# Patient Record
Sex: FEMALE | Race: BLACK OR AFRICAN AMERICAN | Employment: OTHER | URBAN - METROPOLITAN AREA
[De-identification: names, ages, dates, MRNs, and addresses within clinical notes are randomized per-mention and may not be internally consistent; named-entity substitution may affect disease eponyms.]

---

## 2017-07-13 ENCOUNTER — OFFICE VISIT (OUTPATIENT)
Dept: FAMILY MEDICINE CLINIC | Age: 32
End: 2017-07-13

## 2017-07-13 ENCOUNTER — HOSPITAL ENCOUNTER (OUTPATIENT)
Dept: LAB | Age: 32
Discharge: HOME | End: 2017-07-13
Payer: COMMERCIAL

## 2017-07-13 VITALS
SYSTOLIC BLOOD PRESSURE: 118 MMHG | DIASTOLIC BLOOD PRESSURE: 80 MMHG | HEART RATE: 97 BPM | WEIGHT: 263 LBS | HEIGHT: 65 IN | BODY MASS INDEX: 43.82 KG/M2 | TEMPERATURE: 98 F | RESPIRATION RATE: 16 BRPM | OXYGEN SATURATION: 99 %

## 2017-07-13 DIAGNOSIS — R63.5 ABNORMAL WEIGHT GAIN: ICD-10-CM

## 2017-07-13 DIAGNOSIS — R63.5 ABNORMAL WEIGHT GAIN: Primary | ICD-10-CM

## 2017-07-13 PROCEDURE — 84443 ASSAY THYROID STIM HORMONE: CPT | Performed by: FAMILY MEDICINE

## 2017-07-13 PROCEDURE — 36415 COLL VENOUS BLD VENIPUNCTURE: CPT | Performed by: FAMILY MEDICINE

## 2017-07-13 PROCEDURE — 80048 BASIC METABOLIC PNL TOTAL CA: CPT | Performed by: FAMILY MEDICINE

## 2017-07-13 RX ORDER — PHENTERMINE HYDROCHLORIDE 37.5 MG/1
37.5 TABLET ORAL
Qty: 30 TAB | Refills: 2 | Status: SHIPPED | OUTPATIENT
Start: 2017-07-13 | End: 2018-07-16 | Stop reason: SDUPTHER

## 2017-07-13 NOTE — PROGRESS NOTES
HISTORY OF PRESENT ILLNESS  Dara Holder is a 32 y.o. female. HPI  Patient is here today for evaluation and treatment of: Weight Gain / Fatigue    Weight Gain/fatigue:  States that she feels hungry always. Has noted feeling tired throughout the day. May sleep about 6- 6. 5 hours a night; She has been diagnosed with sleep apnea; she used to use CPAP; Thinks this helped when she used it. Has gained about 20 pounds in last 3 months. Has had some stressors. It is unclear if there are thyroid problems in the family. She does not exercise;  Tries to eat a healthy diet. This comes and goes. She does have a gym membership. Has never been on weight loss meds before. PMH,  Meds, Allergies, Family History, Social history reviewed      Current Outpatient Prescriptions:     ibuprofen (MOTRIN) 800 mg tablet, Take 1 Tab by mouth three (3) times daily as needed for Pain., Disp: 40 Tab, Rfl: 0    ethinyl estradiol-etonogestrel (NUVARING) 0.12-0.015 mg/24 hr vaginal ring, Insert intravaginally x 3 weeks, remove for 1 week, and insert a new nuvaring as directed  Indications: PREGNANCY CONTRACEPTION, Disp: 3 Device, Rfl: 12    HYDROcodone-acetaminophen (NORCO) 5-325 mg per tablet, 1-2 tablets every 4-6 hours as needed for pain, Disp: 20 Tab, Rfl: 0    Review of Systems   Constitutional: Positive for malaise/fatigue. Cardiovascular: Negative for chest pain and palpitations. Physical Exam   Constitutional: She appears well-developed and well-nourished. No distress. Cardiovascular: Normal rate and regular rhythm. Exam reveals no gallop and no friction rub. No murmur heard. Pulmonary/Chest: Breath sounds normal. No respiratory distress. She has no wheezes. She has no rales. Musculoskeletal: She exhibits no edema. Nursing note and vitals reviewed. ASSESSMENT and PLAN    ICD-10-CM ICD-9-CM    1.  Abnormal weight gain B69.3 192.0 METABOLIC PANEL, BASIC      TSH 3RD GENERATION       As above, new treatment plan as listed below  Orders Placed This Encounter    METABOLIC PANEL, BASIC    TSH 3RD GENERATION    phentermine (ADIPEX-P) 37.5 mg tablet     Diet and exercise encouraged  Phentermine as ordered; proper use reviewed. Follow-up Disposition:  Return in about 6 weeks (around 8/24/2017) for weight. An After Visit Summary was printed and given to the patient. This has been fully explained to the patient, who indicates understanding.

## 2017-07-13 NOTE — MR AVS SNAPSHOT
Visit Information Date & Time Provider Department Dept. Phone Encounter #  
 7/13/2017  3:40 PM Ney Taylor, [address] [id number] Follow-up Instructions Return in about 6 weeks (around 8/24/2017) for weight. Upcoming Health Maintenance Date Due DTaP/Tdap/Td series (1 - Tdap) 9/17/2006 INFLUENZA AGE 9 TO ADULT 8/1/2017 PAP AKA CERVICAL CYTOLOGY 9/22/2019 Allergies as of 7/13/2017  Review Complete On: 7/13/2017 By: Mariola Berman LPN Severity Noted Reaction Type Reactions Zithromax [Azithromycin] High 05/04/2010   Side Effect Rash Percocet [Oxycodone-acetaminophen]  10/04/2016    Nausea Only Current Immunizations  Never Reviewed No immunizations on file. Not reviewed this visit You Were Diagnosed With   
  
 Codes Comments Abnormal weight gain    -  Primary ICD-10-CM: R63.5 ICD-9-CM: 783.1 Vitals BP Pulse Temp Resp Height(growth percentile) Weight(growth percentile) 118/80 (BP 1 Location: Left arm, BP Patient Position: Sitting) 97 98 °F (36.7 °C) (Oral) 16 5' 5\" (1.651 m) 263 lb (119.3 kg) LMP SpO2 BMI OB Status Smoking Status 07/03/2017 99% 43.77 kg/m2 Having regular periods Former Smoker BMI and BSA Data Body Mass Index Body Surface Area 43.77 kg/m 2 2.34 m 2 Preferred Pharmacy Pharmacy Name Phone [address], [address] [place] [address] [facility] [phone] Your Updated Medication List  
  
   
This list is accurate as of: 7/13/17  4:36 PM.  Always use your most recent med list.  
  
  
  
  
 ethinyl estradiol-etonogestrel 0.12-0.015 mg/24 hr vaginal ring Commonly known as:  Duke Brown Insert intravaginally x 3 weeks, remove for 1 week, and insert a new nuvaring as directed  Indications: PREGNANCY CONTRACEPTION  
  
 ibuprofen 800 mg tablet Commonly known as:  MOTRIN  
 Take 1 Tab by mouth three (3) times daily as needed for Pain.  
  
 phentermine 37.5 mg tablet Commonly known as:  ADIPEX-P Take 1 Tab by mouth every morning. Max Daily Amount: 37.5 mg.  
  
  
  
  
Prescriptions Printed Refills  
 phentermine (ADIPEX-P) 37.5 mg tablet 2 Sig: Take 1 Tab by mouth every morning. Max Daily Amount: 37.5 mg.  
 Class: Print Route: Oral  
  
Follow-up Instructions Return in about 6 weeks (around 8/24/2017) for weight. Patient Instructions Abnormal Weight Gain: Care Instructions Your Care Instructions There are two types of weight gainnormal and abnormal. Normal weight gain is usually caused by eating too much or exercising too little. It can also happen as you get older. But abnormal weight gain has other causes. It can be caused by a problem with your thyroid gland, called hypothyroidism. Or it can be caused by a problem with your adrenal glands, called Cushing's syndrome. Or your body could be holding too much fluid because of kidney, liver, or heart problems. In some cases, a medicine you take can cause you to gain weight. You can work with your doctor to find out the cause of your weight gain. You will probably need tests to do this. Follow-up care is a key part of your treatment and safety. Be sure to make and go to all appointments, and call your doctor if you are having problems. It's also a good idea to know your test results and keep a list of the medicines you take. How can you care for yourself at home? · Weigh yourself at the same time every day. It's best to do it first thing in the morning after you empty your bladder. Be sure to always wear the same amount of clothing. · Write down any changes in your weight and the possible causes. Discuss these with your doctor. · Your doctor may want you to change your diet and exercise habits. A good way to lose weight is to reduce calories and increase exercise. · Walking is an easy way to get exercise. Try to walk a little longer every day. You also may want to swim, bike, or do other activities. · Ask your doctor if you should see a dietitian. This is a person who can help you plan meals that work best for your lifestyle. · If your doctor prescribed medicines, take them exactly as prescribed. Call your doctor if you think you are having a problem with your medicine. You will get more details on the specific medicines your doctor prescribes. When should you call for help? Watch closely for changes in your health, and be sure to contact your doctor if: 
· You do not get better as expected. · You continue to gain weight. Where can you learn more? Go to http://marilynn-gabriela.info/. Enter A175 in the search box to learn more about \"Abnormal Weight Gain: Care Instructions. \" Current as of: October 13, 2016 Content Version: 11.3 © 1975-9338 [employer]. Care instructions adapted under license by [employer] (which disclaims liability or warranty for this information). If you have questions about a medical condition or this instruction, always ask your healthcare professional. [address] any warranty or liability for your use of this information. Please provide this summary of care documentation to your next provider. Your primary care clinician is listed as Marley Partida. If you have any questions after today's visit, please call [phone].

## 2017-07-13 NOTE — PROGRESS NOTES
1. Have you been to the ER, urgent care clinic since your last visit? Hospitalized since your last visit? No    2. Have you seen or consulted any other health care providers outside of the [address] [place] [address] since your last visit? Include any pap smears or colon screening.  [facility]

## 2017-07-13 NOTE — PATIENT INSTRUCTIONS
Abnormal Weight Gain: Care Instructions  Your Care Instructions  There are two types of weight gainnormal and abnormal. Normal weight gain is usually caused by eating too much or exercising too little. It can also happen as you get older. But abnormal weight gain has other causes. It can be caused by a problem with your thyroid gland, called hypothyroidism. Or it can be caused by a problem with your adrenal glands, called Cushing's syndrome. Or your body could be holding too much fluid because of kidney, liver, or heart problems. In some cases, a medicine you take can cause you to gain weight. You can work with your doctor to find out the cause of your weight gain. You will probably need tests to do this. Follow-up care is a key part of your treatment and safety. Be sure to make and go to all appointments, and call your doctor if you are having problems. It's also a good idea to know your test results and keep a list of the medicines you take. How can you care for yourself at home? · Weigh yourself at the same time every day. It's best to do it first thing in the morning after you empty your bladder. Be sure to always wear the same amount of clothing. · Write down any changes in your weight and the possible causes. Discuss these with your doctor. · Your doctor may want you to change your diet and exercise habits. A good way to lose weight is to reduce calories and increase exercise. · Walking is an easy way to get exercise. Try to walk a little longer every day. You also may want to swim, bike, or do other activities. · Ask your doctor if you should see a dietitian. This is a person who can help you plan meals that work best for your lifestyle. · If your doctor prescribed medicines, take them exactly as prescribed. Call your doctor if you think you are having a problem with your medicine. You will get more details on the specific medicines your doctor prescribes.   When should you call for help?  Watch closely for changes in your health, and be sure to contact your doctor if:  · You do not get better as expected. · You continue to gain weight. Where can you learn more? Go to http://marilynn-gabriela.info/. Enter A175 in the search box to learn more about \"Abnormal Weight Gain: Care Instructions. \"  Current as of: October 13, 2016  Content Version: 11.3  © 6730-3603 [employer]. Care instructions adapted under license by [employer] (which disclaims liability or warranty for this information). If you have questions about a medical condition or this instruction, always ask your healthcare professional. [address] any warranty or liability for your use of this information.

## 2017-07-14 LAB
ANION GAP BLD CALC-SCNC: 8 MMOL/L (ref 3–18)
BUN SERPL-MCNC: 15 MG/DL (ref 7–18)
BUN/CREAT SERPL: 18 (ref 12–20)
CALCIUM SERPL-MCNC: 9 MG/DL (ref 8.5–10.1)
CHLORIDE SERPL-SCNC: 106 MMOL/L (ref 100–108)
CO2 SERPL-SCNC: 25 MMOL/L (ref 21–32)
CREAT SERPL-MCNC: 0.82 MG/DL (ref 0.6–1.3)
GLUCOSE SERPL-MCNC: 86 MG/DL (ref 74–99)
POTASSIUM SERPL-SCNC: 4.2 MMOL/L (ref 3.5–5.5)
SODIUM SERPL-SCNC: 139 MMOL/L (ref 136–145)
TSH SERPL DL<=0.05 MIU/L-ACNC: 0.86 UIU/ML (ref 0.36–3.74)

## 2017-08-24 ENCOUNTER — OFFICE VISIT (OUTPATIENT)
Dept: FAMILY MEDICINE CLINIC | Age: 32
End: 2017-08-24

## 2017-08-24 VITALS
SYSTOLIC BLOOD PRESSURE: 110 MMHG | WEIGHT: 248 LBS | OXYGEN SATURATION: 98 % | BODY MASS INDEX: 41.32 KG/M2 | HEART RATE: 85 BPM | TEMPERATURE: 99.1 F | RESPIRATION RATE: 16 BRPM | DIASTOLIC BLOOD PRESSURE: 82 MMHG | HEIGHT: 65 IN

## 2017-08-24 DIAGNOSIS — R63.5 ABNORMAL WEIGHT GAIN: Primary | ICD-10-CM

## 2017-08-24 NOTE — PROGRESS NOTES
1. Have you been to the ER, urgent care clinic since your last visit? Hospitalized since your last visit? No    2. Have you seen or consulted any other health care providers outside of the [address] [place] [address] since your last visit? Include any pap smears or colon screening.  No

## 2017-08-24 NOTE — PROGRESS NOTES
HISTORY OF PRESENT ILLNESS  Martita Pepe is a 32 y.o. female. HPI  Patient is here today for follow up on: Weight Gain    Weight Gain: she is tolerating adipex well; she has lost weight; She has not started exercising yet. She has felt less hungry with adipex and is eating less. Wt Readings from Last 3 Encounters:   08/24/17 248 lb (112.5 kg)   07/13/17 263 lb (119.3 kg)   10/19/16 246 lb (111.6 kg)     BP Readings from Last 3 Encounters:   08/24/17 110/82   07/13/17 118/80   10/19/16 128/82           Current Outpatient Prescriptions:     phentermine (ADIPEX-P) 37.5 mg tablet, Take 1 Tab by mouth every morning. Max Daily Amount: 37.5 mg., Disp: 30 Tab, Rfl: 2    ibuprofen (MOTRIN) 800 mg tablet, Take 1 Tab by mouth three (3) times daily as needed for Pain., Disp: 40 Tab, Rfl: 0    ethinyl estradiol-etonogestrel (NUVARING) 0.12-0.015 mg/24 hr vaginal ring, Insert intravaginally x 3 weeks, remove for 1 week, and insert a new nuvaring as directed  Indications: PREGNANCY CONTRACEPTION, Disp: 3 Device, Rfl: 12    Review of Systems   Constitutional: Negative for chills and fever. Cardiovascular: Negative for chest pain and palpitations. Physical Exam   Constitutional: She appears well-developed and well-nourished. No distress. Cardiovascular: Normal rate and regular rhythm. Exam reveals no gallop and no friction rub. No murmur heard. Pulmonary/Chest: No respiratory distress. She has no wheezes. Nursing note and vitals reviewed. Visit Vitals    /82 (BP 1 Location: Left arm, BP Patient Position: Sitting)    Pulse 85    Temp 99.1 °F (37.3 °C) (Oral)    Resp 16    Ht 5' 5\" (1.651 m)    Wt 248 lb (112.5 kg)    LMP 08/01/2017    SpO2 98%    BMI 41.27 kg/m2         ASSESSMENT and PLAN    ICD-10-CM ICD-9-CM    1. Abnormal weight gain R63.5 783.1        Stable; pt has lost weight with adipex; Encouraged exercise to help promote further weight loss;   Follow-up Disposition:  Return in about 8 weeks (around 10/19/2017) for abnormal weight. An After Visit Summary was printed and given to the patient. This has been fully explained to the patient, who indicates understanding.

## 2017-08-24 NOTE — PATIENT INSTRUCTIONS
Abnormal Weight Gain: Care Instructions  Your Care Instructions  There are two types of weight gain--normal and abnormal. Normal weight gain is usually caused by eating too much or exercising too little. It can also happen as you get older. But abnormal weight gain has other causes. It can be caused by a problem with your thyroid gland, called hypothyroidism. Or it can be caused by a problem with your adrenal glands, called Cushing's syndrome. Or your body could be holding too much fluid because of kidney, liver, or heart problems. In some cases, a medicine you take can cause you to gain weight. You can work with your doctor to find out the cause of your weight gain. You will probably need tests to do this. Follow-up care is a key part of your treatment and safety. Be sure to make and go to all appointments, and call your doctor if you are having problems. It's also a good idea to know your test results and keep a list of the medicines you take. How can you care for yourself at home? · Weigh yourself at the same time every day. It's best to do it first thing in the morning after you empty your bladder. Be sure to always wear the same amount of clothing. · Write down any changes in your weight and the possible causes. Discuss these with your doctor. · Your doctor may want you to change your diet and exercise habits. A good way to lose weight is to reduce calories and increase exercise. · Walking is an easy way to get exercise. Try to walk a little longer every day. You also may want to swim, bike, or do other activities. · Ask your doctor if you should see a dietitian. This is a person who can help you plan meals that work best for your lifestyle. · If your doctor prescribed medicines, take them exactly as prescribed. Call your doctor if you think you are having a problem with your medicine. You will get more details on the specific medicines your doctor prescribes.   When should you call for help?  Watch closely for changes in your health, and be sure to contact your doctor if:  · You do not get better as expected. · You continue to gain weight. Where can you learn more? Go to http://marilynn-gabriela.info/. Enter A175 in the search box to learn more about \"Abnormal Weight Gain: Care Instructions. \"  Current as of: October 13, 2016  Content Version: 11.3  © 3543-1504 [employer]. Care instructions adapted under license by "[employer]" (which disclaims liability or warranty for this information). If you have questions about a medical condition or this instruction, always ask your healthcare professional. [address] any warranty or liability for your use of this information.

## 2017-08-24 NOTE — MR AVS SNAPSHOT
Visit Information Date & Time Provider Department Dept. Phone Encounter #  
 8/24/2017  3:40 PM Pietro Carlin [id number] Follow-up Instructions Return in about 8 weeks (around 10/19/2017) for abnormal weight. Upcoming Health Maintenance Date Due DTaP/Tdap/Td series (1 - Tdap) 9/17/2006 INFLUENZA AGE 9 TO ADULT 8/1/2017 PAP AKA CERVICAL CYTOLOGY 9/22/2019 Allergies as of 8/24/2017  Review Complete On: 8/24/2017 By: Krystian Lopez LPN Severity Noted Reaction Type Reactions Zithromax [Azithromycin] High 05/04/2010   Side Effect Rash Percocet [Oxycodone-acetaminophen]  10/04/2016    Nausea Only Current Immunizations  Never Reviewed No immunizations on file. Not reviewed this visit You Were Diagnosed With   
  
 Codes Comments Abnormal weight gain    -  Primary ICD-10-CM: R63.5 ICD-9-CM: 783.1 Vitals BP Pulse Temp Resp Height(growth percentile) Weight(growth percentile) 110/82 (BP 1 Location: Left arm, BP Patient Position: Sitting) 85 99.1 °F (37.3 °C) (Oral) 16 5' 5\" (1.651 m) 248 lb (112.5 kg) LMP SpO2 BMI OB Status Smoking Status 08/01/2017 98% 41.27 kg/m2 Having regular periods Former Smoker Vitals History BMI and BSA Data Body Mass Index Body Surface Area  
 41.27 kg/m 2 2.27 m 2 Preferred Pharmacy Pharmacy Name Phone [address], [address] [place] [address] [facility] [phone] Your Updated Medication List  
  
   
This list is accurate as of: 8/24/17  4:15 PM.  Always use your most recent med list.  
  
  
  
  
 ethinyl estradiol-etonogestrel 0.12-0.015 mg/24 hr vaginal ring Commonly known as:  [employer]y Cure Insert intravaginally x 3 weeks, remove for 1 week, and insert a new nuvaring as directed  Indications: PREGNANCY CONTRACEPTION  
  
 ibuprofen 800 mg tablet Commonly known as:  MOTRIN Take 1 Tab by mouth three (3) times daily as needed for Pain.  
  
 phentermine 37.5 mg tablet Commonly known as:  ADIPEX-P Take 1 Tab by mouth every morning. Max Daily Amount: 37.5 mg. Follow-up Instructions Return in about 8 weeks (around 10/19/2017) for abnormal weight. Patient Instructions Abnormal Weight Gain: Care Instructions Your Care Instructions There are two types of weight gainnormal and abnormal. Normal weight gain is usually caused by eating too much or exercising too little. It can also happen as you get older. But abnormal weight gain has other causes. It can be caused by a problem with your thyroid gland, called hypothyroidism. Or it can be caused by a problem with your adrenal glands, called Cushing's syndrome. Or your body could be holding too much fluid because of kidney, liver, or heart problems. In some cases, a medicine you take can cause you to gain weight. You can work with your doctor to find out the cause of your weight gain. You will probably need tests to do this. Follow-up care is a key part of your treatment and safety. Be sure to make and go to all appointments, and call your doctor if you are having problems. It's also a good idea to know your test results and keep a list of the medicines you take. How can you care for yourself at home? · Weigh yourself at the same time every day. It's best to do it first thing in the morning after you empty your bladder. Be sure to always wear the same amount of clothing. · Write down any changes in your weight and the possible causes. Discuss these with your doctor. · Your doctor may want you to change your diet and exercise habits. A good way to lose weight is to reduce calories and increase exercise. · Walking is an easy way to get exercise. Try to walk a little longer every day. You also may want to swim, bike, or do other activities. · Ask your doctor if you should see a dietitian. This is a person who can help you plan meals that work best for your lifestyle. · If your doctor prescribed medicines, take them exactly as prescribed. Call your doctor if you think you are having a problem with your medicine. You will get more details on the specific medicines your doctor prescribes. When should you call for help? Watch closely for changes in your health, and be sure to contact your doctor if: 
· You do not get better as expected. · You continue to gain weight. Where can you learn more? Go to http://marilynn-gabriela.info/. Enter A175 in the search box to learn more about \"Abnormal Weight Gain: Care Instructions. \" Current as of: October 13, 2016 Content Version: 11.3 © 0574-7185 [employer]. Care instructions adapted under license by [employer] (which disclaims liability or warranty for this information). If you have questions about a medical condition or this instruction, always ask your healthcare professional. [address] any warranty or liability for your use of this information. Please provide this summary of care documentation to your next provider. Your primary care clinician is listed as Padmini Johnston. If you have any questions after today's visit, please call [phone].

## 2017-09-22 DIAGNOSIS — Z30.09 FAMILY PLANNING: ICD-10-CM

## 2017-09-24 RX ORDER — ETONOGESTREL AND ETHINYL ESTRADIOL .12; .015 MG/D; MG/D
INSERT, EXTENDED RELEASE VAGINAL
Qty: 3 DEVICE | Refills: 12 | Status: SHIPPED | OUTPATIENT
Start: 2017-09-24 | End: 2019-03-11 | Stop reason: SDUPTHER

## 2017-12-27 ENCOUNTER — HOSPITAL ENCOUNTER (EMERGENCY)
Age: 32
Discharge: HOME | End: 2017-12-27
Attending: EMERGENCY MEDICINE | Admitting: EMERGENCY MEDICINE
Payer: COMMERCIAL

## 2017-12-27 ENCOUNTER — APPOINTMENT (OUTPATIENT)
Dept: GENERAL RADIOLOGY | Age: 32
End: 2017-12-27
Attending: PHYSICIAN ASSISTANT
Payer: COMMERCIAL

## 2017-12-27 VITALS
RESPIRATION RATE: 16 BRPM | BODY MASS INDEX: 39.15 KG/M2 | HEART RATE: 84 BPM | SYSTOLIC BLOOD PRESSURE: 117 MMHG | TEMPERATURE: 97.7 F | OXYGEN SATURATION: 100 % | HEIGHT: 65 IN | WEIGHT: 235 LBS | DIASTOLIC BLOOD PRESSURE: 70 MMHG
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
## 2017-12-27 DIAGNOSIS — S76.012A STRAIN OF LEFT HIP, INITIAL ENCOUNTER: ICD-10-CM

## 2017-12-27 DIAGNOSIS — M79.632 PAIN OF LEFT FOREARM: ICD-10-CM

## 2017-12-27 PROCEDURE — 73090 X-RAY EXAM OF FOREARM: CPT

## 2017-12-27 PROCEDURE — 73502 X-RAY EXAM HIP UNI 2-3 VIEWS: CPT

## 2017-12-27 PROCEDURE — 99283 EMERGENCY DEPT VISIT LOW MDM: CPT

## 2017-12-27 PROCEDURE — 74011250637 HC RX REV CODE- 250/637: Performed by: PHYSICIAN ASSISTANT

## 2017-12-27 RX ORDER — METHOCARBAMOL 500 MG/1
1000 TABLET, FILM COATED ORAL
Status: COMPLETED | OUTPATIENT
Start: 2017-12-27 | End: 2017-12-27

## 2017-12-27 RX ORDER — IBUPROFEN 600 MG/1
600 TABLET ORAL
Qty: 20 TAB | Refills: 0 | Status: SHIPPED | OUTPATIENT
Start: 2017-12-27 | End: 2018-06-20 | Stop reason: ALTCHOICE

## 2017-12-27 RX ORDER — NAPROXEN 250 MG/1
500 TABLET ORAL
Status: DISCONTINUED | OUTPATIENT
Start: 2017-12-27 | End: 2017-12-27

## 2017-12-27 RX ORDER — METAXALONE 800 MG/1
800 TABLET ORAL 4 TIMES DAILY
Qty: 20 TAB | Refills: 0 | Status: SHIPPED | OUTPATIENT
Start: 2017-12-27 | End: 2017-12-27

## 2017-12-27 RX ORDER — METAXALONE 800 MG/1
800 TABLET ORAL 4 TIMES DAILY
Qty: 20 TAB | Refills: 0 | Status: SHIPPED | OUTPATIENT
Start: 2017-12-27 | End: 2018-01-01

## 2017-12-27 RX ORDER — METAXALONE 800 MG/1
800 TABLET ORAL
Status: DISCONTINUED | OUTPATIENT
Start: 2017-12-27 | End: 2017-12-27

## 2017-12-27 RX ADMIN — METHOCARBAMOL 1000 MG: 500 TABLET ORAL at 16:12
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
## 2017-12-27 NOTE — Clinical Note
Take medication as prescribed. Follow-up with your primary care physician in 2 days for reassessment. Bring the results from this visit with your for their review. Return to the ED for any new, worsening, or persistent symptoms, including weakness, numbn ess, or any other medical concerns.
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
## 2018-01-03 ENCOUNTER — OFFICE VISIT (OUTPATIENT)
Dept: FAMILY MEDICINE CLINIC | Age: 33
End: 2018-01-03

## 2018-01-03 ENCOUNTER — OFFICE VISIT (OUTPATIENT)
Dept: ORTHOPEDIC SURGERY | Facility: CLINIC | Age: 33
End: 2018-01-03

## 2018-01-03 VITALS
HEIGHT: 65 IN | RESPIRATION RATE: 20 BRPM | OXYGEN SATURATION: 99 % | TEMPERATURE: 98.1 F | BODY MASS INDEX: 41.99 KG/M2 | HEART RATE: 70 BPM | SYSTOLIC BLOOD PRESSURE: 109 MMHG | DIASTOLIC BLOOD PRESSURE: 76 MMHG | WEIGHT: 252 LBS

## 2018-01-03 VITALS — DIASTOLIC BLOOD PRESSURE: 76 MMHG | HEART RATE: 82 BPM | SYSTOLIC BLOOD PRESSURE: 138 MMHG

## 2018-01-03 DIAGNOSIS — M51.36 DDD (DEGENERATIVE DISC DISEASE), LUMBAR: ICD-10-CM

## 2018-01-03 DIAGNOSIS — S70.02XA CONTUSION, THIGH AND HIP, LEFT, INITIAL ENCOUNTER: ICD-10-CM

## 2018-01-03 DIAGNOSIS — S40.012A CONTUSION OF LEFT SHOULDER, INITIAL ENCOUNTER: ICD-10-CM

## 2018-01-03 DIAGNOSIS — M54.12 CERVICAL RADICULOPATHY DUE TO TRAUMA: ICD-10-CM

## 2018-01-03 DIAGNOSIS — R29.898 DECREASED RANGE OF MOTION OF NECK: ICD-10-CM

## 2018-01-03 DIAGNOSIS — S70.12XA CONTUSION, THIGH AND HIP, LEFT, INITIAL ENCOUNTER: ICD-10-CM

## 2018-01-03 DIAGNOSIS — M54.40 ACUTE BILATERAL LOW BACK PAIN WITH SCIATICA, SCIATICA LATERALITY UNSPECIFIED: Primary | ICD-10-CM

## 2018-01-03 DIAGNOSIS — G56.02 CARPAL TUNNEL SYNDROME OF LEFT WRIST: ICD-10-CM

## 2018-01-03 DIAGNOSIS — M54.2 NECK PAIN: ICD-10-CM

## 2018-01-03 DIAGNOSIS — M53.86 DECREASED RANGE OF MOTION OF LUMBAR SPINE: ICD-10-CM
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
## 2018-01-03 PROBLEM — E66.01 OBESITY, MORBID (HCC): Status: ACTIVE | Noted: 2018-01-03

## 2018-01-03 RX ORDER — HYDROCODONE BITARTRATE AND ACETAMINOPHEN 7.5; 325 MG/1; MG/1
1 TABLET ORAL
Qty: 14 TAB | Refills: 0 | Status: SHIPPED | OUTPATIENT
Start: 2018-01-03 | End: 2018-06-20 | Stop reason: ALTCHOICE

## 2018-01-03 RX ORDER — CYCLOBENZAPRINE HCL 10 MG
10 TABLET ORAL
Qty: 30 TAB | Refills: 1 | Status: SHIPPED | OUTPATIENT
Start: 2018-01-03 | End: 2018-06-20 | Stop reason: ALTCHOICE

## 2018-01-03 RX ORDER — IBUPROFEN 800 MG/1
800 TABLET ORAL
Qty: 90 TAB | Refills: 0 | Status: SHIPPED | OUTPATIENT
Start: 2018-01-03 | End: 2018-12-11 | Stop reason: SDUPTHER
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
## 2018-01-05 ENCOUNTER — APPOINTMENT (OUTPATIENT)
Dept: PHYSICAL THERAPY | Age: 33
End: 2018-01-05
Payer: COMMERCIAL

## 2018-01-09 ENCOUNTER — HOSPITAL ENCOUNTER (OUTPATIENT)
Dept: PHYSICAL THERAPY | Age: 33
Discharge: HOME | End: 2018-01-09
Payer: COMMERCIAL

## 2018-01-09 PROCEDURE — 97535 SELF CARE MNGMENT TRAINING: CPT

## 2018-01-09 PROCEDURE — 97110 THERAPEUTIC EXERCISES: CPT

## 2018-01-09 PROCEDURE — 97162 PT EVAL MOD COMPLEX 30 MIN: CPT
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
## 2018-01-11 ENCOUNTER — HOSPITAL ENCOUNTER (OUTPATIENT)
Dept: PHYSICAL THERAPY | Age: 33
Discharge: HOME | End: 2018-01-11
Payer: COMMERCIAL

## 2018-01-11 PROCEDURE — 97113 AQUATIC THERAPY/EXERCISES: CPT

## 2018-01-11 NOTE — PROGRESS NOTES
PT DAILY TREATMENT NOTE     Patient Name: Johan العلي  Date:2018  : 1985  [x]  Patient  Verified  Payor: BLUE CROSS / Plan: [address] [place] [address] [place] / Product Type: PPO /    In time:9:50am   Out time:10:28am  Total Treatment Time (min): 38  Visit #: 2 of 8    Treatment Area: Lumbago with sciatica, unspecified side [M54.40]  Cervicalgia [M54.2]    SUBJECTIVE  Pain Level (0-10 scale): 10  Any medication changes, allergies to medications, adverse drug reactions, diagnosis change, or new procedure performed?: [x] No    [] Yes (see summary sheet for update)  Subjective functional status/changes:   [] No changes reported  Pt states that \"anything rubbing against my leg irritates it\", referring to her left hip region. OBJECTIVE    38 min Therapeutic Exercise:  [x] See flow sheet : Aquatic therapy    Rationale: increase ROM and increase strength to improve the patients ability to perform ADLs and functional mobility tasks with improved ease and decreased pain. With   [] TE   [] TA   [] neuro   [] other: Patient Education: [x] Review HEP    [] Progressed/Changed HEP based on:   [] positioning   [] body mechanics   [] transfers   [] heat/ice application    [] other:      Other Objective/Functional Measures: Initiated aquatic ex per exercises flow sheet. Pain Level (0-10 scale) post treatment: 4/10    ASSESSMENT/Changes in Function: Pt tolerated first session of aquatic therapy well with no reports of increased symptoms. Patient will continue to benefit from skilled PT services to modify and progress therapeutic interventions, address functional mobility deficits, address ROM deficits, address strength deficits, analyze and modify body mechanics/ergonomics and assess and modify postural abnormalities to attain remaining goals.      []  See Plan of Care  []  See progress note/recertification  []  See Discharge Summary         Progress towards goals / Updated goals:  Short Term Goals: To be accomplished in 2 weeks:                         1. Patient will report performance of HEP at least 2 times per day to facilitate improved outcomes and improved self management. Pt stated that she has not begun her HEP. Advised pt on importance of compliance w/HEP.  (1/11/18). 2. Pt will demonstrate ability to perform aquatic therapy void of pain exacerbation to facilitate progression in rehabilitation. Long Term Goals: To be accomplished in 4 weeks:                         1. Patient will report FOTO score of 71 or better to indicate significant improvement in functional status. 2. Pt will demonstrate ability to sit with even weight distribution void of hip pain to improve sitting tolerance. 3. Pt will demonstrate L hip abd 4/5 or better to improve ambulation. 4. Pt will demonstrate WNL trunk AROM to improve ease of ADLs. 5. Pt will demonstrate 4+/5 or better L shoulder strength to improve ease of OH reach.     PLAN  []  Upgrade activities as tolerated     [x]  Continue plan of care  []  Update interventions per flow sheet       []  Discharge due to:_  []  Other:_      Presley Zee, PT 1/11/2018  9:57 AM    Future Appointments  Date Time Provider Pietro Marmolejo   1/16/2018 11:15 AM Presley Zee PT [facility]   1/18/2018 11:15 AM Presley Zee PT [facility]   1/23/2018 1:00 PM [address] [facility]   1/25/2018 1:00 PM [address] [facility]   1/30/2018 11:30 AM [address] [facility]   2/1/2018 12:00 PM Emily Torres [facility]

## 2018-01-16 ENCOUNTER — HOSPITAL ENCOUNTER (OUTPATIENT)
Dept: PHYSICAL THERAPY | Age: 33
Discharge: HOME | End: 2018-01-16
Payer: COMMERCIAL

## 2018-01-16 PROCEDURE — 97113 AQUATIC THERAPY/EXERCISES: CPT

## 2018-01-16 NOTE — PROGRESS NOTES
PT DAILY TREATMENT NOTE     Patient Name: Francoise Caro  Date:2018  : 1985  [x]  Patient  Verified  Payor: BLUE CROSS / Plan: [employer] [facility] [place] / Product Type: PPO /    In time:11:25am  Out time:12:03pm  Total Treatment Time (min): 38  Visit #: 3 of 8    Treatment Area: Lumbago with sciatica, unspecified side [M54.40]  Cervicalgia [M54.2]    SUBJECTIVE  Pain Level (0-10 scale): 1/10  Any medication changes, allergies to medications, adverse drug reactions, diagnosis change, or new procedure performed?: [x] No    [] Yes (see summary sheet for update)  Subjective functional status/changes:   [] No changes reported  Pt states, \"I feel good today\". She reported having pain and \"tingling\" in her B forearms and fingers, L > R when \"leaning\" on her hand or when sleeping and having her UEs elevated. Pt states that yesterday was her first day back to work since 17. OBJECTIVE      38 min Therapeutic Exercise:  [x] See flow sheet : Aquatic therapy    Rationale: increase ROM and increase strength to improve the patients ability to perform ADLs and functional mobility tasks with improved ease and decreased pain. With   [] TE   [] TA   [] neuro   [] other: Patient Education: [x] Review HEP    [] Progressed/Changed HEP based on:   [] positioning   [] body mechanics   [] transfers   [] heat/ice application    [] other:      Other Objective/Functional Measures: Increased reps of noted ex to 15 ea and added open paddles to UE ex to increase resistance. Pain Level (0-10 scale) post treatment: 1/10    ASSESSMENT/Changes in Function: Pt tolerates aquatic ex well and reports overall improvement in symptoms and function since beginning PT.       Patient will continue to benefit from skilled PT services to modify and progress therapeutic interventions, address functional mobility deficits, address ROM deficits, address strength deficits, analyze and modify body mechanics/ergonomics and assess and modify postural abnormalities to attain remaining goals. []  See Plan of Care  []  See progress note/recertification  []  See Discharge Summary         Progress towards goals / Updated goals:  Short Term Goals: To be accomplished in 2 weeks:                         4. Patient will report performance of HEP at least 2 times per day to facilitate improved outcomes and improved self management. Pt stated that she has not begun her HEP. Advised pt on importance of compliance w/HEP.  (1/11/18). Progressing: Pt reports doing her HEP \"every other day\". (1/16/18).                        3. Pt will demonstrate ability to perform aquatic therapy void of pain exacerbation to facilitate progression in rehabilitation. Long Term Goals: To be accomplished in 4 weeks:                         1. Patient will report FOTO score of 71 or better to indicate significant improvement in functional status.                        1. Pt will demonstrate ability to sit with even weight distribution void of hip pain to improve sitting tolerance. Progressing: Pt reports improvement stating that when she initially sits down, she sits with even weight distrubution, but with prolonged sitting she will then distribute more weight to right side as her left hip will then begin to hurt. (1/16/18).                        2. Pt will demonstrate L hip abd 4/5 or better to improve ambulation.                         4. Pt will demonstrate WNL trunk AROM to improve ease of ADLs.                        4.  Pt will demonstrate 4+/5 or better L shoulder strength to improve ease of OH reach.       PLAN  []  Upgrade activities as tolerated     [x]  Continue plan of care  []  Update interventions per flow sheet       []  Discharge due to:_  []  Other:_      Enid Boxer, PT 1/16/2018  11:34 AM    Future Appointments  Date Time Provider Pietro Marmolejo   1/18/2018 11:15 AM Enid Boxer, PT [facility]   1/23/2018 1:00 PM [address] [facility]   1/25/2018 1:00 PM [address] [facility]   1/30/2018 11:30 AM Bryanna Cordova [facility]   2/1/2018 12:00 PM Martell Nix PTA [facility]

## 2018-01-18 ENCOUNTER — APPOINTMENT (OUTPATIENT)
Dept: PHYSICAL THERAPY | Age: 33
End: 2018-01-18
Payer: COMMERCIAL

## 2018-01-23 ENCOUNTER — HOSPITAL ENCOUNTER (OUTPATIENT)
Dept: PHYSICAL THERAPY | Age: 33
Discharge: HOME | End: 2018-01-23
Payer: COMMERCIAL

## 2018-01-23 PROCEDURE — 97112 NEUROMUSCULAR REEDUCATION: CPT

## 2018-01-23 PROCEDURE — 97110 THERAPEUTIC EXERCISES: CPT

## 2018-01-23 NOTE — PROGRESS NOTES
PT DAILY TREATMENT NOTE     Patient Name: Brittani Luna  Date:2018  : 1985  [x]  Patient  Verified  Payor: BLUE CROSS / Plan: [address] [place] [address] [place] / Product Type: PPO /    In time:1:02pm  Out time:1:49pm  Total Treatment Time (min): 52  Visit #: 4 of 6    Treatment Area: Lumbago with sciatica, unspecified side [M54.40]  Cervicalgia [M54.2]    SUBJECTIVE  Pain Level (0-10 scale): 3  Any medication changes, allergies to medications, adverse drug reactions, diagnosis change, or new procedure performed?: [x] No    [] Yes (see summary sheet for update)  Subjective functional status/changes:   [] No changes reported  \"I'm doing better. Its just my hip (L) and my back that start bothering me if I'm on my feet too long. \"    OBJECTIVE  28 min Therapeutic Exercise:  [x] See flow sheet :   Rationale: increase ROM and increase strength to improve the patients ability to improve ease of ADLs. 9 min Neuromuscular Re-education:  [x]  See flow sheet :   Rationale: increase strength and improve coordination  to improve the patients ability to improve ease of ADLs. Modality rationale: decrease pain to improve the patients ability to improve ease of ADLs.    Min Type Additional Details    [] Estim:  []Unatt       []IFC  []Premod                        []Other:  []w/ice   []w/heat  Position:  Location:    [] Estim: []Att    []TENS instruct  []NMES                    []Other:  []w/US   []w/ice   []w/heat  Position:  Location:    []  Traction: [] Cervical       []Lumbar                       [] Prone          []Supine                       []Intermittent   []Continuous Lbs:  [] before manual  [] after manual    []  Ultrasound: []Continuous   [] Pulsed                           []1MHz   []3MHz Location:  W/cm2:    []  Iontophoresis with dexamethasone         Location: [] Take home patch   [] In clinic   10 []  Ice     [x]  heat  []  Ice massage  []  Laser   []  Anodyne Position: seated  Location: L hip []  Laser with stim  []  Other: Position:  Location:    []  Vasopneumatic Device Pressure:       [] lo [] med [] hi   Temperature: [] lo [] med [] hi   [] Skin assessment post-treatment:  []intact []redness- no adverse reaction    []redness - adverse reaction:          With   [] TE   [] TA   [] neuro   [] other: Patient Education: [x] Review HEP    [] Progressed/Changed HEP based on:   [] positioning   [] body mechanics   [] transfers   [] heat/ice application    [] other:      Other Objective/Functional Measures:    Normal gait pattern   WNL UE AROM during interventions    Pain with L hip abd in sidelying     Pain Level (0-10 scale) post treatment: 5    ASSESSMENT/Changes in Function: Pt demonstrates improved L shoulder AROM and gait pattern, but demonstrates some pain provocation with L hip abd. Will continue to gradually progress activity as tolerated and gradual progress into WB activity. Patient will continue to benefit from skilled PT services to modify and progress therapeutic interventions, address functional mobility deficits, address ROM deficits, address strength deficits, analyze and address soft tissue restrictions, analyze and cue movement patterns, analyze and modify body mechanics/ergonomics and instruct in home and community integration to attain remaining goals. []  See Plan of Care  []  See progress note/recertification  []  See Discharge Summary         Progress towards goals / Updated goals:  Short Term Goals: To be accomplished in 2 weeks:                         3. Patient will report performance of HEP at least 2 times per day to facilitate improved outcomes and improved self management. Pt stated that she has not begun her HEP.  Advised pt on importance of compliance w/HEP.  (1/11/18).  Progressing: Pt reports doing her HEP \"every other day\". (1/16/18).                            8. Pt will demonstrate ability to perform aquatic therapy void of pain exacerbation to facilitate progression in rehabilitation. Long Term Goals: To be accomplished in 4 weeks:                         1. Patient will report FOTO score of 71 or better to indicate significant improvement in functional status.                        2. Pt will demonstrate ability to sit with even weight distribution void of hip pain to improve sitting tolerance. Progressing: Pt reports improvement stating that when she initially sits down, she sits with even weight distrubution, but with prolonged sitting she will then distribute more weight to right side as her left hip will then begin to hurt. (18).                        8. Pt will demonstrate L hip abd 4/5 or better to improve ambulation.                         4. Pt will demonstrate WNL trunk AROM to improve ease of ADLs.                        2. Pt will demonstrate 4+/5 or better L shoulder strength to improve ease of OH reach.     PLAN  [x]  Upgrade activities as tolerated     [x]  Continue plan of care  []  Update interventions per flow sheet       []  Discharge due to:_  []  Other:_      Margaret Leung, PT, DPT, ATC 2018  2:00 PM    Future Appointments  Date Time Provider Pietro Marmolejo   2018 1:00 PM Renan Phillips, PT [facility]   2018 11:30 AM Margaret Leung [facility]   2018 12:00 PM Holly Conklin PTA [facility]

## 2018-01-25 ENCOUNTER — HOSPITAL ENCOUNTER (OUTPATIENT)
Dept: PHYSICAL THERAPY | Age: 33
Discharge: HOME | End: 2018-01-25
Payer: COMMERCIAL

## 2018-01-25 PROCEDURE — 97112 NEUROMUSCULAR REEDUCATION: CPT

## 2018-01-25 PROCEDURE — 97110 THERAPEUTIC EXERCISES: CPT

## 2018-01-25 NOTE — PROGRESS NOTES
PT DAILY TREATMENT NOTE 3-16    Patient Name: Benjamin Dewitt  Date:2018  : 1985  [x]  Patient  Verified  Payor: BLUE CROSS / Plan: [employer] [facility] [employer] / Product Type: PPO /    In time:1302  Out time:1345  Total Treatment Time (min): 43  Visit #: 5 of 6    Treatment Area: Lumbago with sciatica, unspecified side [M54.40]  Cervicalgia [M54.2]    SUBJECTIVE  Pain Level (0-10 scale): 2-3  Any medication changes, allergies to medications, adverse drug reactions, diagnosis change, or new procedure performed?: [x] No    [] Yes (see summary sheet for update)  Subjective functional status/changes:   [] No changes reported  Pt reports she is feeling better, but she has a time limit on how long that is; the time limit is increasing,     OBJECTIVE     min []Eval                  []Re-Eval     25 min Therapeutic Exercise:  [x] See flow sheet :   Rationale: increase ROM and increase strength to improve the patients ability to perform ADLs    8 min Neuromuscular Re-education:  [x]  See flow sheet :   Rationale: increase strength, improve coordination and increase proprioception  to improve the patients ability to improve core engagement     Modality rationale: decrease pain and increase tissue extensibility to improve the patients ability to improve ADLs   Min Type Additional Details    [] Estim:  []Unatt       []IFC  []Premod                        []Other:  []w/ice   []w/heat  Position:  Location:    [] Estim: []Att    []TENS instruct  []NMES                    []Other:  []w/US   []w/ice   []w/heat  Position:  Location:    []  Traction: [] Cervical       []Lumbar                       [] Prone          []Supine                       []Intermittent   []Continuous Lbs:  [] before manual  [] after manual    []  Ultrasound: []Continuous   [] Pulsed                           []1MHz   []3MHz Location:  W/cm2:    []  Iontophoresis with dexamethasone         Location: [] Take home patch   [] In clinic   10 []  Ice [x]  heat  []  Ice massage  []  Laser   []  Anodyne Position: seated  Location: L hip    []  Laser with stim  []  Other: Position:  Location:    []  Vasopneumatic Device Pressure:       [] lo [] med [] hi   Temperature: [] lo [] med [] hi   [x] Skin assessment post-treatment:  [x]intact []redness- no adverse reaction    []redness - adverse reaction:            With   [] TE   [] TA   [] neuro   [] other: Patient Education: [x] Review HEP    [] Progressed/Changed HEP based on:   [] positioning   [] body mechanics   [] transfers   [] heat/ice application    [] other:      Other Objective/Functional Measures: reports relative pain-free ability to complete aquatic program  Cramping in L glutes with LTRs     Pain Level (0-10 scale) post treatment: 3    ASSESSMENT/Changes in Function:   Pt reports cramping in L glutes during exercises; able to tolerate moderate pressure with rolling stick. Discussed with pt that we will try some functional movements NV, including lifting and carrying objects, to help with daily activities. She did report some L UE \"feeling\" after QP leg extensions. Patient will continue to benefit from skilled PT services to modify and progress therapeutic interventions, address functional mobility deficits, address ROM deficits, address strength deficits, analyze and address soft tissue restrictions, analyze and cue movement patterns, analyze and modify body mechanics/ergonomics, assess and modify postural abnormalities and instruct in home and community integration to attain remaining goals. []  See Plan of Care  []  See progress note/recertification  []  See Discharge Summary         Progress towards goals / Updated goals:  Short Term Goals: To be accomplished in 2 weeks:                         4. Patient will report performance of HEP at least 2 times per day to facilitate improved outcomes and improved self management.  Pt stated that she has not begun her HEP.  Advised pt on importance of compliance w/HEP.  (1/11/18).  Progressing: Pt reports doing her HEP \"every other day\". (1/16/18).                           2. Pt will demonstrate ability to perform aquatic therapy void of pain exacerbation to facilitate progression in rehabilitation. Met 1/25/2018  Long Term Goals: To be accomplished in 4 weeks:                         1. Patient will report FOTO score of 71 or better to indicate significant improvement in functional status. Unable to complete, tablets did not work 1/25/2018                         7. Pt will demonstrate ability to sit with even weight distribution void of hip pain to improve sitting tolerance. Progressing: Pt reports improvement stating that when she initially sits down, she sits with even weight distrubution, but with prolonged sitting she will then distribute more weight to right side as her left hip will then begin to hurt.  (1/16/18).                           1. Pt will demonstrate L hip abd 4/5 or better to improve ambulation.                         4. Pt will demonstrate WNL trunk AROM to improve ease of ADLs.                        1. Pt will demonstrate 4+/5 or better L shoulder strength to improve ease of OH reach.     PLAN  [x]  Upgrade activities as tolerated     [x]  Continue plan of care  [x]  Update interventions per flow sheet       []  Discharge due to:_  []  Other:_      Yaima Turner PT 1/25/2018  1:14 PM    Future Appointments  Date Time Provider Pietro Marmolejo   1/30/2018 11:30 AM [address] [place] [address] [facility]   2/1/2018 12:00 PM [facility] Espinal [facility]

## 2018-01-30 ENCOUNTER — HOSPITAL ENCOUNTER (OUTPATIENT)
Dept: PHYSICAL THERAPY | Age: 33
Discharge: HOME | End: 2018-01-30
Payer: COMMERCIAL

## 2018-01-30 PROCEDURE — 97112 NEUROMUSCULAR REEDUCATION: CPT

## 2018-01-30 PROCEDURE — 97110 THERAPEUTIC EXERCISES: CPT

## 2018-01-30 NOTE — PROGRESS NOTES
PT DAILY TREATMENT NOTE     Patient Name: Nenita Page  Date:2018  : 1985  [x]  Patient  Verified  Payor: BLUE CROSS / Plan: [employer] [facility] / Product Type: PPO /    In time:1:34pm  Out time:12:17pm  Total Treatment Time (min): 43  Visit #: 6 of 8    Treatment Area: Lumbago with sciatica, unspecified side [M54.40]  Cervicalgia [M54.2]    SUBJECTIVE  Pain Level (0-10 scale): 7  Any medication changes, allergies to medications, adverse drug reactions, diagnosis change, or new procedure performed?: [x] No    [] Yes (see summary sheet for update)  Subjective functional status/changes:   [] No changes reported  Pt reports onset of spasms after sitting for > 1 hour yesterday that has increased her pain in her L lateral hip. She reports her other symptoms in her back, neck, and shoulders have abated \"for the most part\". OBJECTIVE  25 min Therapeutic Exercise:  [x] See flow sheet :   Rationale: increase ROM and increase strength to improve the patients ability to perform ADLs. 8 min Neuromuscular Re-education:  [x]  See flow sheet :   Rationale: increase strength, improve coordination and increase proprioception  to improve the patients ability to improve core engagement. Modality rationale: decrease pain and increase tissue extensibility to improve the patients ability to improve ease of ADLs.    Min Type Additional Details    [] Estim:  []Unatt       []IFC  []Premod                        []Other:  []w/ice   []w/heat  Position:  Location:    [] Estim: []Att    []TENS instruct  []NMES                    []Other:  []w/US   []w/ice   []w/heat  Position:  Location:    []  Traction: [] Cervical       []Lumbar                       [] Prone          []Supine                       []Intermittent   []Continuous Lbs:  [] before manual  [] after manual    []  Ultrasound: []Continuous   [] Pulsed                           []1MHz   []3MHz Location:  W/cm2:    []  Iontophoresis with dexamethasone         Location: [] Take home patch   [] In clinic   10 []  Ice     [x]  heat  []  Ice massage  []  Laser   []  Anodyne Position:   Location:    []  Laser with stim  []  Other: Position:  Location:    []  Vasopneumatic Device Pressure:       [] lo [] med [] hi   Temperature: [] lo [] med [] hi   [] Skin assessment post-treatment:  []intact []redness- no adverse reaction    []redness - adverse reaction:         With   [] TE   [] TA   [] neuro   [] other: Patient Education: [x] Review HEP    [] Progressed/Changed HEP based on:   [] positioning   [] body mechanics   [] transfers   [] heat/ice application    [] other:      Other Objective/Functional Measures:   Grossly improved L hip mobility, some discomfort with end range hip mobility reported    Pain Level (0-10 scale) post treatment: 6    ASSESSMENT/Changes in Function: Pt demonstrates some grossly improved hip mobility, but reports pain provocation after prolonged sitting yesterday. Will trial alternating land and aquatic visits to help address symptoms d/t prior symptomatic relief and encourage progression of movement. Patient will continue to benefit from skilled PT services to modify and progress therapeutic interventions, address functional mobility deficits, address ROM deficits, address strength deficits, analyze and address soft tissue restrictions, analyze and cue movement patterns, analyze and modify body mechanics/ergonomics and instruct in home and community integration to attain remaining goals. []  See Plan of Care  []  See progress note/recertification  []  See Discharge Summary         Progress towards goals / Updated goals:  Short Term Goals: To be accomplished in 2 weeks:                         9. Patient will report performance of HEP at least 2 times per day to facilitate improved outcomes and improved self management. Pt stated that she has not begun her HEP.  Advised pt on importance of compliance w/HEP.  (1/11/18).  Progressing: Pt reports doing her HEP \"every other day\". (1/16/18).                           2. Pt will demonstrate ability to perform aquatic therapy void of pain exacerbation to facilitate progression in rehabilitation. Met 1/25/2018  Long Term Goals: To be accomplished in 4 weeks:                         1. Patient will report FOTO score of 71 or better to indicate significant improvement in functional status. Unable to complete, tablets did not work 1/25/2018                         5. Pt will demonstrate ability to sit with even weight distribution void of hip pain to improve sitting tolerance. Progressing: Pt reports improvement stating that when she initially sits down, she sits with even weight distrubution, but with prolonged sitting she will then distribute more weight to right side as her left hip will then begin to hurt.  (1/16/18).                           8. Pt will demonstrate L hip abd 4/5 or better to improve ambulation.                         4. Pt will demonstrate WNL trunk AROM to improve ease of ADLs.                        3. Pt will demonstrate 4+/5 or better L shoulder strength to improve ease of OH reach. PLAN  []  Upgrade activities as tolerated     [x]  Continue plan of care  []  Update interventions per flow sheet       []  Discharge due to:_  [x]  Other:_ Return to aquatics 2/2 limited land tolerance. Will continue alternating aquatics and land. If deep hip pain persisting, will refer for further workup.      Yuridia Rowell, PT, DPT, ATC 1/30/2018  11:43 AM    Future Appointments  Date Time Provider Pietro Marmolejo   2/1/2018 12:00 PM Myranda Kerr PTA [facility]

## 2018-02-01 ENCOUNTER — APPOINTMENT (OUTPATIENT)
Dept: PHYSICAL THERAPY | Age: 33
End: 2018-02-01
Payer: COMMERCIAL

## 2018-02-06 ENCOUNTER — HOSPITAL ENCOUNTER (OUTPATIENT)
Dept: PHYSICAL THERAPY | Age: 33
Discharge: HOME | End: 2018-02-06
Payer: COMMERCIAL

## 2018-02-06 PROCEDURE — 97112 NEUROMUSCULAR REEDUCATION: CPT | Performed by: PHYSICAL THERAPIST

## 2018-02-06 PROCEDURE — 97110 THERAPEUTIC EXERCISES: CPT | Performed by: PHYSICAL THERAPIST

## 2018-02-06 NOTE — PROGRESS NOTES
PT DAILY TREATMENT NOTE     Patient Name: Julio Romano  Date:2018  : 1985  [x]  Patient  Verified  Payor: BLUE CROSS / Plan: [employer] [facility] / Product Type: PPO /    In time:1034  Out time:1112  Total Treatment Time (min): 38  Visit #: 7 of 8    Treatment Area: Lumbago with sciatica, unspecified side [M54.40]  Cervicalgia [M54.2]    SUBJECTIVE  Pain Level (0-10 scale): 0  Any medication changes, allergies to medications, adverse drug reactions, diagnosis change, or new procedure performed?: [x] No    [] Yes (see summary sheet for update)  Subjective functional status/changes:   [] No changes reported  \"I feel really good today! I'm working full time again, I notice that I need to alternate between sitting and standing instead of just standing and walking all day. \"    OBJECTIVE    23 min Therapeutic Exercise:  [] See flow sheet :   Rationale: increase ROM, increase strength, improve coordination, improve balance and increase proprioception to improve the patients ability to tolerate standing/walking without increased c/o pain. 15 min Neuromuscular Re-education:  []  See flow sheet :   Rationale: increase ROM, increase strength, improve coordination, improve balance and increase proprioception  to improve the patients ability to tolerate standing/walking without increased c/o pain. With   [] TE   [] TA   [] neuro   [] other: Patient Education: [x] Review HEP    [] Progressed/Changed HEP based on:   [] positioning   [] body mechanics   [] transfers   [] heat/ice application    [] other:      Other Objective/Functional Measures: Pt notes improved standing tolerance during her day. Pain Level (0-10 scale) post treatment: 0    ASSESSMENT/Changes in Function: Pt tolerates treatment well today and without no reports of pain before/during/throughout treatment.   She notes benefit from new exercises initiated today, and continues to benefit from skilled PT intervention to address goals and remaining deficits. Patient will continue to benefit from skilled PT services to modify and progress therapeutic interventions, address functional mobility deficits, address strength deficits, analyze and address soft tissue restrictions, analyze and cue movement patterns, analyze and modify body mechanics/ergonomics, assess and modify postural abnormalities and address imbalance/dizziness to attain remaining goals. []  See Plan of Care  []  See progress note/recertification  []  See Discharge Summary         Progress towards goals / Updated goals:  Short Term Goals: To be accomplished in 2 weeks:                         4. Patient will report performance of HEP at least 2 times per day to facilitate improved outcomes and improved self management. Pt stated that she has not begun her HEP.  Advised pt on importance of compliance w/HEP.  (1/11/18).  Progressing: Pt reports doing her HEP \"every other day\". (1/16/18).                           2. Pt will demonstrate ability to perform aquatic therapy void of pain exacerbation to facilitate progression in rehabilitation. Met 1/25/2018  Long Term Goals: To be accomplished in 4 weeks:                         1. Patient will report FOTO score of 71 or better to indicate significant improvement in functional status. Unable to complete, tablets did not work 1/25/2018                         1. Pt will demonstrate ability to sit with even weight distribution void of hip pain to improve sitting tolerance. Progressing: Pt reports improvement stating that when she initially sits down, she sits with even weight distrubution, but with prolonged sitting she will then distribute more weight to right side as her left hip will then begin to hurt.  (1/16/18).                           1. Pt will demonstrate L hip abd 4/5 or better to improve ambulation.                         4. Pt will demonstrate WNL trunk AROM to improve ease of ADLs.                          9. Pt will demonstrate 4+/5 or better L shoulder strength to improve ease of OH reach.     PLAN  []  Upgrade activities as tolerated     []  Continue plan of care  []  Update interventions per flow sheet       []  Discharge due to:_  []  Other:_      Anahi Boudreaux, PT 2/6/2018  11:47 AM    Future Appointments  Date Time Provider Pietro Marmolejo   2/9/2018 2:15 PM Vipin Bassett, NANO [facility]   2/13/2018 9:30 AM Sukhjinder Pedro PTA [facility]   2/15/2018 9:45 AM Vipin Bassett, PT [facility]   2/20/2018 12:00 PM Areli Billingsley [facility]   2/22/2018 9:45 AM Vipin Bassett, PT [facility]

## 2018-02-13 ENCOUNTER — HOSPITAL ENCOUNTER (OUTPATIENT)
Dept: PHYSICAL THERAPY | Age: 33
Discharge: HOME | End: 2018-02-13
Payer: COMMERCIAL

## 2018-02-13 PROCEDURE — 97110 THERAPEUTIC EXERCISES: CPT

## 2018-02-13 PROCEDURE — 97112 NEUROMUSCULAR REEDUCATION: CPT

## 2018-02-13 NOTE — PROGRESS NOTES
PT DAILY TREATMENT NOTE     Patient Name: Chris Raya  Date:2018  : 1985  [x]  Patient  Verified  Payor: Donna Estrada / Plan:  [facility] [place] / Product Type: PPO /    In time:9:35  Out time:10:00  Total Treatment Time (min): 25  Visit #: 8 of 8    Treatment Area: Lumbago with sciatica, unspecified side [M54.40]  Cervicalgia [M54.2]    SUBJECTIVE  Pain Level (0-10 scale): 0/10  Any medication changes, allergies to medications, adverse drug reactions, diagnosis change, or new procedure performed?: [x] No    [] Yes (see summary sheet for update)  Subjective functional status/changes:   [] No changes reported  \"It hasn't been hurting for 2 weeks. \"  \"I feel almost human. \"    OBJECTIVE    15 min Therapeutic Exercise:  [x] See flow sheet :   Rationale: increase ROM and increase strength to improve the patients ability to tolerate ADLs. 10 min Neuromuscular Re-education:  [x]  See flow sheet :   Rationale: increase strength, improve coordination and increase proprioception  to improve the patients ability to perform functional activities. With   [] TE   [] TA   [] neuro   [] other: Patient Education: [x] Review HEP    [] Progressed/Changed HEP based on:   [] positioning   [] body mechanics   [] transfers   [] heat/ice application    [] other:      Other Objective/Functional Measures: FOTO: 72/83 (lumbar/neck)     L shoulder MMT 5/5   L hip abduction MMT 4+/5. Trunk AROM WNL pain free     Pain Level (0-10 scale) post treatment: 0/10    ASSESSMENT/Changes in Function: Pt has met all functional goals. Pt DC'd to HEP. []  See Plan of Care  []  See progress note/recertification  [x]  See Discharge Summary         Progress towards goals / Updated goals:  Short Term Goals: To be accomplished in 2 weeks:                         1. Patient will report performance of HEP at least 2 times per day to facilitate improved outcomes and improved self management.  Pt stated that she has not begun her HEP.  Advised pt on importance of compliance w/HEP.  (1/11/18).  Progressing: Pt reports doing her HEP \"every other day\". (1/16/18).                           2. Pt will demonstrate ability to perform aquatic therapy void of pain exacerbation to facilitate progression in rehabilitation. Met 1/25/2018  Long Term Goals: To be accomplished in 4 weeks:                         1. Patient will report FOTO score of 71 or better to indicate significant improvement in functional status. Met FOTO 72/83 (lumbar/neck). 2/13/18                         2. Pt will demonstrate ability to sit with even weight distribution void of hip pain to improve sitting tolerance. - met. 2/13/18                         3. Pt will demonstrate L hip abd 4/5 or better to improve ambulation.- met L hip abduction 4+/5. 2/13/18                         4. Pt will demonstrate WNL trunk AROM to improve ease of ADLs. - Met. 2/13/18                         5. Pt will demonstrate 4+/5 or better L shoulder strength to improve ease of OH reach. - met L shoulder 5/5 strength.  2/13/18       PLAN  []  Upgrade activities as tolerated     [x]  Continue plan of care  []  Update interventions per flow sheet       []  Discharge due to:_  []  Other:_      Lynnette Pedersen, CATERINA 2/13/2018  9:38 AM    Future Appointments  Date Time Provider Pietro Marmolejo   2/15/2018 9:45 AM [facility]   2/20/2018 12:00 PM Melisa Martin [facility]   2/22/2018 9:45 AM [facility]

## 2018-02-15 ENCOUNTER — APPOINTMENT (OUTPATIENT)
Dept: PHYSICAL THERAPY | Age: 33
End: 2018-02-15
Payer: COMMERCIAL

## 2018-02-20 ENCOUNTER — APPOINTMENT (OUTPATIENT)
Dept: PHYSICAL THERAPY | Age: 33
End: 2018-02-20
Payer: COMMERCIAL

## 2018-02-22 ENCOUNTER — APPOINTMENT (OUTPATIENT)
Dept: PHYSICAL THERAPY | Age: 33
End: 2018-02-22
Payer: COMMERCIAL

## 2018-02-23 NOTE — PROGRESS NOTES
[facility]  [address]  [place], [address]  [phone] fax    Physical Therapy Discharge Summary  Patient name: Beatrice Horn Start of Care: 2018   Referral source: Hector Chau MD : 1985   Medical/Treatment Diagnosis: Lumbago with sciatica, unspecified side [M54.40]  Cervicalgia [M54.2] Onset Date:2017     Prior Hospitalization: see medical history Provider#: [id number]   Medications: Verified on Patient Summary List     Comorbidities: none per pt report   Prior Level of Function: unlimited with daily activity void of pain  Visits from Start of Care: 8    Missed Visits: 3  Reporting Period : 2018 to 2018    Summary of Care:  Progress towards goals / Updated goals:  Short Term Goals: To be accomplished in 2 weeks:                         1. Patient will report performance of HEP at least 2 times per day to facilitate improved outcomes and improved self management. Pt stated that she has not begun her HEP.  Advised pt on importance of compliance w/HEP.  (18).  Progressing: Pt reports doing her HEP \"every other day\". (18).                           2. Pt will demonstrate ability to perform aquatic therapy void of pain exacerbation to facilitate progression in rehabilitation. Met 2018  Long Term Goals: To be accomplished in 4 weeks:                         1. Patient will report FOTO score of 71 or better to indicate significant improvement in functional status. Met FOTO 72/83 (lumbar/neck). 18                         2. Pt will demonstrate ability to sit with even weight distribution void of hip pain to improve sitting tolerance. - met. 18                         3. Pt will demonstrate L hip abd 4/5 or better to improve ambulation.- met L hip abduction 4+/5. 18                         4. Pt will demonstrate WNL trunk AROM to improve ease of ADLs. - Met. 2/13/18                         5. Pt will demonstrate 4+/5 or better L shoulder strength to improve ease of OH reach. - met L shoulder 5/5 strength. 2/13/18    ASSESSMENT/RECOMMENDATIONS: Pt has met all functional goals and reports no pain for 2 weeks as of last visit. Pt DC'd to HEP.     [x]Discontinue therapy: [x]Patient has reached or is progressing toward set goals      []Patient is non-compliant or has abdicated      []Due to lack of appreciable progress towards set goals    Ronen Myers PT, DPT, ATC 2/23/2018 3:26 PM

## 2018-06-20 ENCOUNTER — OFFICE VISIT (OUTPATIENT)
Dept: FAMILY MEDICINE CLINIC | Age: 33
End: 2018-06-20

## 2018-06-20 VITALS
WEIGHT: 267.8 LBS | DIASTOLIC BLOOD PRESSURE: 82 MMHG | SYSTOLIC BLOOD PRESSURE: 123 MMHG | HEIGHT: 65 IN | RESPIRATION RATE: 18 BRPM | HEART RATE: 93 BPM | OXYGEN SATURATION: 99 % | BODY MASS INDEX: 44.62 KG/M2 | TEMPERATURE: 98.2 F

## 2018-06-20 DIAGNOSIS — R05.9 COUGH: Primary | ICD-10-CM

## 2018-06-20 DIAGNOSIS — F41.9 SEVERE ANXIETY: ICD-10-CM

## 2018-06-20 DIAGNOSIS — Z87.09 HISTORY OF BRONCHITIS: ICD-10-CM

## 2018-06-20 DIAGNOSIS — J30.89 PERENNIAL ALLERGIC RHINITIS: ICD-10-CM

## 2018-06-20 RX ORDER — ALBUTEROL SULFATE 90 UG/1
1 AEROSOL, METERED RESPIRATORY (INHALATION)
Qty: 1 INHALER | Refills: 0 | Status: SHIPPED | OUTPATIENT
Start: 2018-06-20 | End: 2021-01-27 | Stop reason: ALTCHOICE

## 2018-06-20 RX ORDER — MONTELUKAST SODIUM 10 MG/1
10 TABLET ORAL DAILY
Qty: 30 TAB | Refills: 1 | Status: SHIPPED | OUTPATIENT
Start: 2018-06-20 | End: 2021-01-27 | Stop reason: ALTCHOICE

## 2018-06-20 RX ORDER — PREDNISONE 10 MG/1
TABLET ORAL
Qty: 21 TAB | Refills: 0 | Status: SHIPPED | OUTPATIENT
Start: 2018-06-20 | End: 2018-07-16 | Stop reason: ALTCHOICE

## 2018-06-20 RX ORDER — PHENTERMINE HYDROCHLORIDE 37.5 MG/1
37.5 TABLET ORAL
Qty: 30 TAB | Refills: 2 | Status: CANCELLED | OUTPATIENT
Start: 2018-06-20

## 2018-06-20 NOTE — PROGRESS NOTES
HISTORY OF PRESENT ILLNESS    Sahil Matthews is a 28y.o. year old female comes in today to be evaluated and treated for:  Cough      Patients symptoms have been present for 2 weeks. Patient  complains of non productive cough. Patient  Reports she has been using her son's albuterol inhaler daily for the last week with improvement. denies fever/chills. Patient reports none pertinent exposure to illness. Patient states she does have allergies, does not take any medications for treatment. Reports she has tried allegra, zyrtec, claritin, etc without improvement. Reports her  has noticed that she has had change in her breathing at times and will have to wake her up. Anxiety: reports symptoms have been present for the last 5 years, worse over the last month. States previously her symptoms were intermittent and would breath and get through it. Reports she has had increased personal and work related stressors whihch seem to come together. Denies previous treatment. Denies family history of anxiety/depression. Comments she feels her heart racing when anxiety increased. PHQ-9 depression questionnaire score 3 reflecting very minimal depression; previously N/A  YASMIN-7 anxiety questionnaire score 15 reflecting severe anxiety; previously N/A          Allergies   Allergen Reactions    Zithromax [Azithromycin] Rash    Percocet [Oxycodone-Acetaminophen] Nausea Only     Current Outpatient Prescriptions   Medication Sig Dispense Refill    ibuprofen (MOTRIN) 800 mg tablet Take 1 Tab by mouth three (3) times daily (with meals). Indications: Pain 90 Tab 0    NUVARING 0.12-0.015 mg/24 hr vaginal ring INSERT 1 RING VAGINALLY FOR 3 WEEKS THEN REMOVE FOR 1 WEEK AND INSERT A NEW RING 3 Device 12    phentermine (ADIPEX-P) 37.5 mg tablet Take 1 Tab by mouth every morning.  Max Daily Amount: 37.5 mg. 30 Tab 2     Past Medical History:   Diagnosis Date    Allergic conjunctivitides     allergic conjuctivitis    Anemia NEC     Contact dermatitis and other eczema, due to unspecified cause     HS Bilat axilla    Generalized headaches 4/26/2010    Ill-defined condition     Hydrodenitis    Seasonal allergic rhinitis     Sleep apnea     does not use cpap         ROS:  Review of Systems - General ROS: negative for - chills or fever  ENT ROS: positive for - nasal congestion  Allergy and Immunology ROS: positive for - seasonal allergies  Respiratory ROS: positive for - cough and shortness of breath  negative for - wheezing  Cardiovascular ROS: no chest pain or dyspnea on exertion  Psychological ROS: positive for - anxiety        Objective:  Visit Vitals    /82 (BP 1 Location: Left arm, BP Patient Position: Sitting)    Pulse 93    Temp 98.2 °F (36.8 °C) (Oral)    Resp 18    Ht 5' 5\" (1.651 m)    Wt 267 lb 12.8 oz (121.5 kg)    LMP 06/15/2018    SpO2 99%    BMI 44.56 kg/m2     General appearance: alert, cooperative, no distress  Ears: normal TM's and external ear canals AU  Nose: turbinates pale, swollen, no sinus tenderness, no crusting or bleeding points  Throat: Lips, mucosa, and tongue normal. Teeth and gums normal, cobblestoning post oropharynx  Neck: supple, symmetrical, trachea midline and no adenopathy  Lungs: clear to auscultation bilaterally  Heart: regular rate and rhythm, S1, S2 normal, no murmur, click, rub or gallop  Mental status - normal mood, behavior, speech, dress, motor activity, and thought processes, affect appropriate to mood         Peak flow reading is 356.7, about 73 % of predicted. Normal Predicted Average Peak Flow: 489  Actual Average Peak Flow: 356.7  BEST 400    1st attempt: 350  2nd attempt: 400  3rd attempt: 320            Assessment/Plan:     ICD-10-CM ICD-9-CM    1. Cough R05 786.2 XR CHEST PA LAT      albuterol (PROVENTIL HFA, VENTOLIN HFA, PROAIR HFA) 90 mcg/actuation inhaler      predniSONE (STERAPRED DS) 10 mg dose pack   2. History of bronchitis Z87.09 V12.69    3.  Perennial allergic rhinitis J30.89 477.8 montelukast (SINGULAIR) 10 mg tablet   4. Severe anxiety F41.9 300.00 REFERRAL TO PSYCHOLOGY   alarm signs when to seek emergent care provided and reviewed   I have discussed the diagnosis with the patient and the intended plan as seen in the above orders. The patient has received an after-visit summary and questions were answered concerning future plans. I have discussed medication side effects and warnings with the patient as well. Patient Verbalizes understanding. Follow-up Disposition:  Return in about 2 weeks (around 7/4/2018) for cough/weight.

## 2018-06-20 NOTE — ACP (ADVANCE CARE PLANNING)
Do you have an Advance Care Planning? YES  Would you like to receive some information about ACP?  YES

## 2018-06-20 NOTE — MR AVS SNAPSHOT
Charleen Oviedo 
 
 
 [address] [address] [phone] Patient: Dusty Lopez MRN: [id number] OQM:[birth year]54/[birth year] Visit Information Date & Time Provider Department Dept. Phone Encounter #  
 6/20/2018  8:00 AM Danny Lakhani NP [address] [place], [place] [id number] Follow-up Instructions Return in about 2 weeks (around 7/4/2018) for cough/weight. Upcoming Health Maintenance Date Due DTaP/Tdap/Td series (1 - Tdap) 9/17/2006 Influenza Age 5 to Adult 8/1/2018 PAP AKA CERVICAL CYTOLOGY 9/22/2019 Allergies as of 6/20/2018  Review Complete On: 6/20/2018 By: Danny Lakhani NP Severity Noted Reaction Type Reactions Zithromax [Azithromycin] High 05/04/2010   Side Effect Rash Percocet [Oxycodone-acetaminophen]  10/04/2016    Nausea Only Current Immunizations  Never Reviewed No immunizations on file. Not reviewed this visit You Were Diagnosed With   
  
 Codes Comments Cough    -  Primary ICD-10-CM: E93 ICD-9-CM: 786.2 History of bronchitis     ICD-10-CM: Z87.09 
ICD-9-CM: V12.69 Perennial allergic rhinitis     ICD-10-CM: J30.89 ICD-9-CM: 477.8 Severe anxiety     ICD-10-CM: F41.9 ICD-9-CM: 300.00 Vitals BP Pulse Temp Resp Height(growth percentile) Weight(growth percentile) 123/82 (BP 1 Location: Left arm, BP Patient Position: Sitting) 93 98.2 °F (36.8 °C) (Oral) 18 5' 5\" (1.651 m) 267 lb 12.8 oz (121.5 kg) LMP SpO2 BMI OB Status Smoking Status 06/15/2018 99% 44.56 kg/m2 Having regular periods Former Smoker Vitals History BMI and BSA Data Body Mass Index Body Surface Area 44.56 kg/m 2 2.36 m 2 Preferred Pharmacy Pharmacy Name Phone [address], [address] [place] [address] [phone] [facility] [phone] Your Updated Medication List  
  
   
 This list is accurate as of 6/20/18  8:50 AM.  Always use your most recent med list.  
  
  
  
  
 albuterol 90 mcg/actuation inhaler Commonly known as:  PROVENTIL HFA, VENTOLIN HFA, PROAIR HFA Take 1 Puff by inhalation every four (4) hours as needed for Wheezing. ibuprofen 800 mg tablet Commonly known as:  MOTRIN Take 1 Tab by mouth three (3) times daily (with meals). Indications: Pain  
  
 montelukast 10 mg tablet Commonly known as:  SINGULAIR Take 1 Tab by mouth daily. NUVARING 0.12-0.015 mg/24 hr vaginal ring Generic drug:  ethinyl estradiol-etonogestrel INSERT 1 RING VAGINALLY FOR 3 WEEKS THEN REMOVE FOR 1 WEEK AND INSERT A NEW RING  
  
 phentermine 37.5 mg tablet Commonly known as:  ADIPEX-P Take 1 Tab by mouth every morning. Max Daily Amount: 37.5 mg.  
  
 predniSONE 10 mg dose pack Commonly known as:  STERAPRED DS See administration instruction per 10mg dose pack Prescriptions Sent to Pharmacy Refills  
 montelukast (SINGULAIR) 10 mg tablet 1 Sig: Take 1 Tab by mouth daily. Class: Normal  
 Pharmacy: [address] [place] [address] Ph #: [phone] Route: Oral  
 albuterol (PROVENTIL HFA, VENTOLIN HFA, PROAIR HFA) 90 mcg/actuation inhaler 0 Sig: Take 1 Puff by inhalation every four (4) hours as needed for Wheezing. Class: Normal  
 Pharmacy: [address] [place] [address] Ph #: [phone] Route: Inhalation  
 predniSONE (STERAPRED DS) 10 mg dose pack 0 Sig: See administration instruction per 10mg dose pack Class: Normal  
 Pharmacy: [address] [place] [address] Ph #: [phone] We Performed the Following REFERRAL TO PSYCHOLOGY [XJF95 Custom] Follow-up Instructions Return in about 2 weeks (around 7/4/2018) for cough/weight. To-Do List   
 06/20/2018 Imaging:  XR CHEST PA LAT Referral Information Referral ID Referred By Referred To  
  
 [id number] Melani Franklin Not Available Visits Status Start Date End Date 1 New Request 6/20/18 6/20/19 If your referral has a status of pending review or denied, additional information will be sent to support the outcome of this decision. Patient Instructions Cough: Care Instructions Your Care Instructions A cough is your body's response to something that bothers your throat or airways. Many things can cause a cough. You might cough because of a cold or the flu, bronchitis, or asthma. Smoking, postnasal drip, allergies, and stomach acid that backs up into your throat also can cause coughs. A cough is a symptom, not a disease. Most coughs stop when the cause, such as a cold, goes away. You can take a few steps at home to cough less and feel better. Follow-up care is a key part of your treatment and safety. Be sure to make and go to all appointments, and call your doctor if you are having problems. It's also a good idea to know your test results and keep a list of the medicines you take. How can you care for yourself at home? · Drink lots of water and other fluids. This helps thin the mucus and soothes a dry or sore throat. Honey or lemon juice in hot water or tea may ease a dry cough. · Take cough medicine as directed by your doctor. · Prop up your head on pillows to help you breathe and ease a dry cough. · Try cough drops to soothe a dry or sore throat. Cough drops don't stop a cough. Medicine-flavored cough drops are no better than candy-flavored drops or hard candy. · Do not smoke. Avoid secondhand smoke. If you need help quitting, talk to your doctor about stop-smoking programs and medicines. These can increase your chances of quitting for good. When should you call for help? Call 911 anytime you think you may need emergency care. For example, call if: 
? · You have severe trouble breathing. ?Call your doctor now or seek immediate medical care if: 
? · You cough up blood. ? · You have new or worse trouble breathing. ? · You have a new or higher fever. ? · You have a new rash. ? Watch closely for changes in your health, and be sure to contact your doctor if: 
? · You cough more deeply or more often, especially if you notice more mucus or a change in the color of your mucus. ? · You have new symptoms, such as a sore throat, an earache, or sinus pain. ? · You do not get better as expected. Where can you learn more? Go to http://marilynn-gabriela.info/. Enter D279 in the search box to learn more about \"Cough: Care Instructions. \" Current as of: May 12, 2017 Content Version: 11.4 © 6662-7293 [employer]. Care instructions adapted under license by [employer] (which disclaims liability or warranty for this information). If you have questions about a medical condition or this instruction, always ask your healthcare professional. [place] [address] any warranty or liability for your use of this information. Introducing [facility] & HEALTH SERVICES! [employer] introduces EverythingMe patient portal. Now you can access parts of your medical record, email your doctor's office, and request medication refills online. 1. In your internet browser, go to [email or web address] 2. Click on the First Time User? Click Here link in the Sign In box. You will see the New Member Sign Up page. 3. Enter your EverythingMe Access Code exactly as it appears below. You will not need to use this code after youve completed the sign-up process. If you do not sign up before the expiration date, you must request a new code. · EverythingMe Access Code: [email or web address] Expires: 9/18/2018  8:50 AM 
 
 4. Enter the last four digits of your Social Security Number (xxxx) and Date of Birth (mm/dd/yyyy) as indicated and click Submit. You will be taken to the next sign-up page. 5. Create a Nambii ID. This will be your Nambii login ID and cannot be changed, so think of one that is secure and easy to remember. 6. Create a Nambii password. You can change your password at any time. 7. Enter your Password Reset Question and Answer. This can be used at a later time if you forget your password. 8. Enter your e-mail address. You will receive e-mail notification when new information is available in [address] 9. Click Sign Up. You can now view and download portions of your medical record. 10. Click the Download Summary menu link to download a portable copy of your medical information. If you have questions, please visit the Frequently Asked Questions section of the Nambii website. Remember, Nambii is NOT to be used for urgent needs. For medical emergencies, dial 911. Now available from your iPhone and Android! Please provide this summary of care documentation to your next provider. Your primary care clinician is listed as PROVIDER UNKNOWN. If you have any questions after today's visit, please call [phone].

## 2018-06-20 NOTE — PATIENT INSTRUCTIONS

## 2018-06-20 NOTE — PROGRESS NOTES
Chief Complaint   Patient presents with    Cough     Patient is here today for cough x 2kws. Pt sts that when she laughs she coughs really bad. Also  has notice some werd breathing at night. Pt has sts during anxiety screening that she has anxiety. Form was given. Peak flow reading   1st:350  2nd:325  3rd:400    1. Have you been to the ER, urgent care clinic since your last visit? Hospitalized since your last visit? No    2. Have you seen or consulted any other health care providers outside of the [address] [place] [address] since your last visit? Include any pap smears or colon screening.  Yes Patient Mima Yi

## 2018-06-21 ENCOUNTER — TELEPHONE (OUTPATIENT)
Dept: FAMILY MEDICINE CLINIC | Age: 33
End: 2018-06-21

## 2018-06-21 NOTE — TELEPHONE ENCOUNTER
[address] requested for 90 day supply instead of 30 da. Per Provider ok to have the 90 day. Called Pharmacy Rx changed to 90 day.

## 2018-07-16 ENCOUNTER — OFFICE VISIT (OUTPATIENT)
Dept: FAMILY MEDICINE CLINIC | Age: 33
End: 2018-07-16

## 2018-07-16 VITALS
WEIGHT: 268.8 LBS | HEIGHT: 65 IN | BODY MASS INDEX: 44.79 KG/M2 | TEMPERATURE: 98.3 F | RESPIRATION RATE: 16 BRPM | OXYGEN SATURATION: 98 % | DIASTOLIC BLOOD PRESSURE: 81 MMHG | SYSTOLIC BLOOD PRESSURE: 125 MMHG | HEART RATE: 91 BPM

## 2018-07-16 DIAGNOSIS — J30.2 SEASONAL ALLERGIC RHINITIS, UNSPECIFIED TRIGGER: ICD-10-CM

## 2018-07-16 DIAGNOSIS — R63.5 ABNORMAL WEIGHT GAIN: ICD-10-CM

## 2018-07-16 DIAGNOSIS — E66.01 OBESITY, CLASS III, BMI 40-49.9 (MORBID OBESITY) (HCC): Primary | ICD-10-CM

## 2018-07-16 RX ORDER — PHENTERMINE HYDROCHLORIDE 37.5 MG/1
37.5 TABLET ORAL
Qty: 30 TAB | Refills: 0 | Status: SHIPPED | OUTPATIENT
Start: 2018-07-16 | End: 2019-02-14 | Stop reason: SDUPTHER

## 2018-07-16 NOTE — PATIENT INSTRUCTIONS
Learning About Dietary Guidelines  What are the Dietary Guidelines for Americans? Dietary Guidelines for Americans provide tips for eating well and staying healthy. This helps reduce the risk for long-term (chronic) diseases. These adult guidelines from the [place] recommend that you:  · Eat lots of fruits, vegetables, whole grains, and low-fat or nonfat dairy products. · Try to balance your eating with your activity. This helps you stay at a healthy weight. · Drink alcohol in moderation, if at all. · Limit foods high in salt, saturated fat, trans fat, and added sugar. What is MyPlate? MyPlate is the U.S. government's food guide. It can help you make your own well-balanced eating plan. A balanced eating plan means that you eat enough, but not too much, and that your food gives you the nutrients you need to stay healthy. MyPlate focuses on eating plenty of whole grains, fruits, and vegetables, and on limiting fat and sugar. It is available online at www. ChooseMyPlate.gov. How can you get started? MyPlate suggests that most adults eat certain amounts from the different food groups:  Grains  Eat 5 to 8 ounces of grains each day. Half of those should be whole grains. Choose whole-grain breads, cold and cooked cereals and grains, pasta (without creamy sauces), hard rolls, or low-fat or fat-free crackers. Vegetables  Eat 2 to 3 cups of vegetables every day. They contain little if any fat. And they have lots of nutrients that help protect against heart disease. Fruits  Eat 1½ to 2 cups of fruits every day. Fruits contain very little fat but lots of nutrients. Protein foods  Most adults need 5 to 6½ ounces each day. Choose fish and lean poultry more often. Eat red meat and fried meats less often. Dried beans, tofu, and nuts are also good sources of protein. Dairy  Most adults need 3 cups of milk and milk products a day. Choose low-fat or fat-free products from this food group.  If you have problems digesting milk, try eating cheese or yogurt instead. Limit fats and oils, including those used in cooking. When you do use fats, choose oils that are liquid at room temperature (unsaturated fats). These include canola oil and olive oil. Avoid foods with trans fats, such as many fried foods, cookies, and snack foods. Where can you learn more? Go to http://marilynn-gabriela.info/. Enter W046 in the search box to learn more about \"Learning About Dietary Guidelines. \"  Current as of: May 12, 2017  Content Version: 11.7  © 2599-8449 [employer]. Care instructions adapted under license by [employer] (which disclaims liability or warranty for this information). If you have questions about a medical condition or this instruction, always ask your healthcare professional. [address] any warranty or liability for your use of this information.

## 2018-07-16 NOTE — PROGRESS NOTES
HISTORY OF PRESENT ILLNESS    Lorrie Callaway is a 28y.o. year old female here today to follow up for:  Cough and weight management    Patients symptoms have improved with singulair, denies any adverse effects. Weight: states recently joined [employer], currently exercising 2 days a week. Patient states her diet is not the best, she eats 'everything' a lot of fried/fatty foods. She does like to eat a lot of chips. States she has been on phentermine in the past with success. Comments lost 20-25 lbs after 1 month, then was not consistent with medication. Allergies   Allergen Reactions    Zithromax [Azithromycin] Rash    Percocet [Oxycodone-Acetaminophen] Nausea Only     Current Outpatient Prescriptions   Medication Sig Dispense Refill    montelukast (SINGULAIR) 10 mg tablet Take 1 Tab by mouth daily. 30 Tab 1    albuterol (PROVENTIL HFA, VENTOLIN HFA, PROAIR HFA) 90 mcg/actuation inhaler Take 1 Puff by inhalation every four (4) hours as needed for Wheezing. 1 Inhaler 0    predniSONE (STERAPRED DS) 10 mg dose pack See administration instruction per 10mg dose pack 21 Tab 0    ibuprofen (MOTRIN) 800 mg tablet Take 1 Tab by mouth three (3) times daily (with meals). Indications: Pain 90 Tab 0    NUVARING 0.12-0.015 mg/24 hr vaginal ring INSERT 1 RING VAGINALLY FOR 3 WEEKS THEN REMOVE FOR 1 WEEK AND INSERT A NEW RING 3 Device 12    phentermine (ADIPEX-P) 37.5 mg tablet Take 1 Tab by mouth every morning.  Max Daily Amount: 37.5 mg. 30 Tab 2     Past Medical History:   Diagnosis Date    Allergic conjunctivitides     allergic conjuctivitis    Anemia NEC     Contact dermatitis and other eczema, due to unspecified cause     HS Bilat axilla    Generalized headaches 4/26/2010    Ill-defined condition     Hydrodenitis    Seasonal allergic rhinitis     Sleep apnea     does not use cpap       ROS:  Review of Systems - General ROS: negative  Respiratory ROS: no cough, shortness of breath, or wheezing  Cardiovascular ROS: no chest pain or dyspnea on exertion      Objective:  Visit Vitals    /81 (BP 1 Location: Left arm, BP Patient Position: Sitting)    Pulse 91    Temp 98.3 °F (36.8 °C) (Oral)    Resp 16    Ht 5' 5\" (1.651 m)    Wt 268 lb 12.8 oz (121.9 kg)    LMP 06/17/2018    SpO2 98%    BMI 44.73 kg/m2     General appearance - alert, well appearing, and in no distress  Neck - supple, no significant adenopathy  Chest - clear to auscultation, no wheezes, rales or rhonchi, symmetric air entry  Heart - normal rate, regular rhythm, normal S1, S2, no murmurs, rubs, clicks or gallops  Extremities: extremities normal, atraumatic, no cyanosis or edema        Assessment/Plan:     ICD-10-CM ICD-9-CM    1. Obesity, Class III, BMI 40-49.9 (morbid obesity) ([facility]) E66.01 278.01    2. Abnormal weight gain R63.5 783.1 phentermine (ADIPEX-P) 37.5 mg tablet   3. Seasonal allergic rhinitis, unspecified trigger J30.2 477.9    anticipatory guidance for above provided and reviewed. I have discussed the diagnosis with the patient and the intended plan as seen in the above orders. The patient has received an after-visit summary and questions were answered concerning future plans. I have discussed medication side effects and warnings with the patient as well. Pt verbalizes understanding. Follow-up Disposition:  Return in about 4 weeks (around 8/13/2018) for weight .

## 2018-07-16 NOTE — PROGRESS NOTES
Chief Complaint   Patient presents with    Cough    Weight Management     Patient is here today for F/U cough and weight management. 1. Have you been to the ER, urgent care clinic since your last visit? Hospitalized since your last visit? No    2. Have you seen or consulted any other health care providers outside of the [address] [place] [address] since your last visit? Include any pap smears or colon screening.  No

## 2018-07-16 NOTE — MR AVS SNAPSHOT
[facility] 
 
 
 [address] [place] [place] [address] [address] [place] [address] 
[phone] Patient: Celine Wilcox MRN: [id number] ECM:[birth year] Visit Information Date & Time Provider Department Dept. Phone Encounter #  
 7/16/2018  7:45 AM GLO Waldrop Radha [address] [facility] [id number] Follow-up Instructions Return in about 4 weeks (around 8/13/2018) for weight . Upcoming Health Maintenance Date Due DTaP/Tdap/Td series (1 - Tdap) 10/11/2018* Influenza Age 5 to Adult 8/1/2018 PAP AKA CERVICAL CYTOLOGY 9/22/2019 *Topic was postponed. The date shown is not the original due date. Allergies as of 7/16/2018  Review Complete On: 7/16/2018 By: Anabella Buchanan NP Severity Noted Reaction Type Reactions Zithromax [Azithromycin] High 05/04/2010   Side Effect Rash Percocet [Oxycodone-acetaminophen]  10/04/2016    Nausea Only Current Immunizations  Never Reviewed No immunizations on file. Not reviewed this visit You Were Diagnosed With   
  
 Codes Comments Obesity, Class III, BMI 40-49.9 (morbid obesity) ([facility]    -  Primary ICD-10-CM: E66.01 
ICD-9-CM: 278.01 Abnormal weight gain     ICD-10-CM: R63.5 ICD-9-CM: 783.1 Seasonal allergic rhinitis, unspecified trigger     ICD-10-CM: J30.2 ICD-9-CM: 477.9 Vitals BP Pulse Temp Resp Height(growth percentile) Weight(growth percentile) 125/81 (BP 1 Location: Left arm, BP Patient Position: Sitting) 91 98.3 °F (36.8 °C) (Oral) 16 5' 5\" (1.651 m) 268 lb 12.8 oz (121.9 kg) LMP SpO2 BMI OB Status Smoking Status 06/17/2018 98% 44.73 kg/m2 Having regular periods Former Smoker BMI and BSA Data Body Mass Index Body Surface Area 44.73 kg/m 2 2.36 m 2 Preferred Pharmacy Pharmacy Name Phone [address], [address] [address]00 [facility] [phone] Your Updated Medication List  
  
   
This list is accurate as of 7/16/18  8:35 AM.  Always use your most recent med list.  
  
  
  
  
 albuterol 90 mcg/actuation inhaler Commonly known as:  PROVENTIL HFA, VENTOLIN HFA, PROAIR HFA Take 1 Puff by inhalation every four (4) hours as needed for Wheezing. ibuprofen 800 mg tablet Commonly known as:  MOTRIN Take 1 Tab by mouth three (3) times daily (with meals). Indications: Pain  
  
 montelukast 10 mg tablet Commonly known as:  SINGULAIR Take 1 Tab by mouth daily. NUVARING 0.12-0.015 mg/24 hr vaginal ring Generic drug:  ethinyl estradiol-etonogestrel INSERT 1 RING VAGINALLY FOR 3 WEEKS THEN REMOVE FOR 1 WEEK AND INSERT A NEW RING  
  
 phentermine 37.5 mg tablet Commonly known as:  ADIPEX-P Take 1 Tab by mouth every morning. Max Daily Amount: 37.5 mg.  
  
 predniSONE 10 mg dose pack Commonly known as:  STERAPRED DS See administration instruction per 10mg dose pack Prescriptions Printed Refills  
 phentermine (ADIPEX-P) 37.5 mg tablet 0 Sig: Take 1 Tab by mouth every morning. Max Daily Amount: 37.5 mg.  
 Class: Print Route: Oral  
  
Follow-up Instructions Return in about 4 weeks (around 8/13/2018) for weight . Patient Instructions Learning About Dietary Guidelines What are the Dietary Guidelines for Americans? Dietary Guidelines for Americans provide tips for eating well and staying healthy. This helps reduce the risk for long-term (chronic) diseases. These adult guidelines from the [place] recommend that you: 
· Eat lots of fruits, vegetables, whole grains, and low-fat or nonfat dairy products. · Try to balance your eating with your activity. This helps you stay at a healthy weight. · Drink alcohol in moderation, if at all. · Limit foods high in salt, saturated fat, trans fat, and added sugar. What is MyPlate? MyPlate is the U.S. government's food guide. It can help you make your own well-balanced eating plan. A balanced eating plan means that you eat enough, but not too much, and that your food gives you the nutrients you need to stay healthy. MyPlate focuses on eating plenty of whole grains, fruits, and vegetables, and on limiting fat and sugar. It is available online at www. ChooseMyPlate.gov. How can you get started? MyPlate suggests that most adults eat certain amounts from the different food groups: 
Grains Eat 5 to 8 ounces of grains each day. Half of those should be whole grains. Choose whole-grain breads, cold and cooked cereals and grains, pasta (without creamy sauces), hard rolls, or low-fat or fat-free crackers. Vegetables Eat 2 to 3 cups of vegetables every day. They contain little if any fat. And they have lots of nutrients that help protect against heart disease. Fruits Eat 1½ to 2 cups of fruits every day. Fruits contain very little fat but lots of nutrients. Protein foods Most adults need 5 to 6½ ounces each day. Choose fish and lean poultry more often. Eat red meat and fried meats less often. Dried beans, tofu, and nuts are also good sources of protein. Dairy Most adults need 3 cups of milk and milk products a day. Choose low-fat or fat-free products from this food group. If you have problems digesting milk, try eating cheese or yogurt instead. Limit fats and oils, including those used in cooking. When you do use fats, choose oils that are liquid at room temperature (unsaturated fats). These include canola oil and olive oil. Avoid foods with trans fats, such as many fried foods, cookies, and snack foods. Where can you learn more? Go to http://marilynn-gabriela.info/. Enter V808 in the search box to learn more about \"Learning About Dietary Guidelines. \" Current as of: May 12, 2017 Content Version: 11.7 © 3818-0202 [employer].  Care instructions adapted under license by [address] (which disclaims liability or warranty for this information). If you have questions about a medical condition or this instruction, always ask your healthcare professional. [address] any warranty or liability for your use of this information. Introducing [facility] & HEALTH SERVICES! Halle Laureano introduces Imcompany patient portal. Now you can access parts of your medical record, email your doctor's office, and request medication refills online. 1. In your internet browser, go to [email or web address] 2. Click on the First Time User? Click Here link in the Sign In box. You will see the New Member Sign Up page. 3. Enter your Imcompany Access Code exactly as it appears below. You will not need to use this code after youve completed the sign-up process. If you do not sign up before the expiration date, you must request a new code. · Imcompany Access Code: [email or web address] Expires: 9/18/2018  8:50 AM 
 
4. Enter the last four digits of your Social Security Number (xxxx) and Date of Birth (mm/dd/yyyy) as indicated and click Submit. You will be taken to the next sign-up page. 5. Create a Imcompany ID. This will be your Imcompany login ID and cannot be changed, so think of one that is secure and easy to remember. 6. Create a Imcompany password. You can change your password at any time. 7. Enter your Password Reset Question and Answer. This can be used at a later time if you forget your password. 8. Enter your e-mail address. You will receive e-mail notification when new information is available in [address] 9. Click Sign Up. You can now view and download portions of your medical record. 10. Click the Download Summary menu link to download a portable copy of your medical information. If you have questions, please visit the Frequently Asked Questions section of the Imcompany website.  Remember, Imcompany is NOT to be used for urgent needs. For medical emergencies, dial 911. Now available from your iPhone and Android! Please provide this summary of care documentation to your next provider. Your primary care clinician is listed as PROVIDER UNKNOWN. If you have any questions after today's visit, please call [phone].

## 2018-10-18 ENCOUNTER — CLINICAL SUPPORT (OUTPATIENT)
Dept: FAMILY MEDICINE CLINIC | Age: 33
End: 2018-10-18

## 2018-10-18 DIAGNOSIS — Z01.812 BLOOD TESTS PRIOR TO TREATMENT OR PROCEDURE: ICD-10-CM

## 2018-10-18 DIAGNOSIS — E66.9 OBESITY, UNSPECIFIED CLASSIFICATION, UNSPECIFIED OBESITY TYPE, UNSPECIFIED WHETHER SERIOUS COMORBIDITY PRESENT: Primary | ICD-10-CM

## 2018-10-18 DIAGNOSIS — E66.01 MORBID (SEVERE) OBESITY DUE TO EXCESS CALORIES (HCC): Primary | ICD-10-CM

## 2018-10-18 NOTE — PROGRESS NOTES
Patient attended a Medically Supervised Weight Loss New Patient Orientation today where we discussed:  - New Direction Very Low Calorie Diet details  - Medical Supervision  - Nutrition education  - Cost of Meal Replacements  - Policies and compliance required for program enrollment.      Patients initial consultation with physician is tentatively scheduled for:  Future Appointments   Date Time Provider Pietro Marmolejo   11/7/2018  2:00 PM Jocelin Gardner NP [facility] [address]

## 2019-01-14 RX ORDER — IBUPROFEN 800 MG/1
TABLET ORAL
Qty: 90 TAB | Refills: 0 | Status: SHIPPED | OUTPATIENT
Start: 2019-01-14 | End: 2019-05-31

## 2019-02-14 ENCOUNTER — OFFICE VISIT (OUTPATIENT)
Dept: FAMILY MEDICINE CLINIC | Age: 34
End: 2019-02-14

## 2019-02-14 VITALS
BODY MASS INDEX: 41.72 KG/M2 | HEART RATE: 83 BPM | OXYGEN SATURATION: 98 % | RESPIRATION RATE: 16 BRPM | DIASTOLIC BLOOD PRESSURE: 74 MMHG | TEMPERATURE: 98.2 F | SYSTOLIC BLOOD PRESSURE: 108 MMHG | HEIGHT: 65 IN | WEIGHT: 250.4 LBS

## 2019-02-14 DIAGNOSIS — E66.01 OBESITY, CLASS III, BMI 40-49.9 (MORBID OBESITY) (HCC): Primary | ICD-10-CM

## 2019-02-14 DIAGNOSIS — R63.5 ABNORMAL WEIGHT GAIN: ICD-10-CM

## 2019-02-14 RX ORDER — PHENTERMINE HYDROCHLORIDE 37.5 MG/1
37.5 TABLET ORAL
Qty: 30 TAB | Refills: 1 | Status: SHIPPED | OUTPATIENT
Start: 2019-02-14 | End: 2019-05-31

## 2019-02-14 NOTE — PROGRESS NOTES
HISTORY OF PRESENT ILLNESS  Zach Gauthier is a 35 y.o. female. Patient presents today to follow up on weight. Patient states she has been going to [employer] 3 times per week and has decreased her portion sizes and also decreased carbohydrate intake. Previously was taking phentermine without any adverse effects. Allergies   Allergen Reactions    Zithromax [Azithromycin] Rash    Percocet [Oxycodone-Acetaminophen] Nausea Only     Current Outpatient Medications   Medication Sig Dispense Refill    ibuprofen (MOTRIN) 800 mg tablet TAKE 1 TABLET BY MOUTH THREE TIMES DAILY WITH MEALS FOR PAIN 90 Tab 0    albuterol (PROVENTIL HFA, VENTOLIN HFA, PROAIR HFA) 90 mcg/actuation inhaler Take 1 Puff by inhalation every four (4) hours as needed for Wheezing. 1 Inhaler 0    NUVARING 0.12-0.015 mg/24 hr vaginal ring INSERT 1 RING VAGINALLY FOR 3 WEEKS THEN REMOVE FOR 1 WEEK AND INSERT A NEW RING 3 Device 12    phentermine (ADIPEX-P) 37.5 mg tablet Take 1 Tab by mouth every morning. Max Daily Amount: 37.5 mg. 30 Tab 0    montelukast (SINGULAIR) 10 mg tablet Take 1 Tab by mouth daily.  30 Tab 1     Past Medical History:   Diagnosis Date    Allergic conjunctivitides     allergic conjuctivitis    Anemia NEC     Contact dermatitis and other eczema, due to unspecified cause     HS Bilat axilla    Generalized headaches 4/26/2010    Ill-defined condition     Hydrodenitis    Seasonal allergic rhinitis     Sleep apnea     does not use cpap     Social History     Socioeconomic History    Marital status:      Spouse name: Not on file    Number of children: Not on file    Years of education: Not on file    Highest education level: Not on file   Social Needs    Financial resource strain: Not on file    Food insecurity - worry: Not on file    Food insecurity - inability: Not on file   [employer] needs - medical: Not on file   [employer] needs - non-medical: Not on file   Occupational History  Not on file   Tobacco Use    Smoking status: Former Smoker    Smokeless tobacco: Never Used   Substance and Sexual Activity    Alcohol use: No    Drug use: No    Sexual activity: Yes     Partners: Male     Birth control/protection: None   Other Topics Concern    Not on file   Social History Narrative    Not on file     Wt Readings from Last 3 Encounters:   02/14/19 250 lb 6.4 oz (113.6 kg)   07/16/18 268 lb 12.8 oz (121.9 kg)   06/20/18 267 lb 12.8 oz (121.5 kg)     Review of Systems   Respiratory: Negative for shortness of breath. Cardiovascular: Negative for chest pain and palpitations. Neurological: Negative for dizziness and headaches. /74 (BP 1 Location: Left arm)   Pulse 83   Temp 98.2 °F (36.8 °C) (Oral)   Resp 16   Ht 5' 5\" (1.651 m)   Wt 250 lb 6.4 oz (113.6 kg)   LMP 01/18/2019 (Exact Date)   SpO2 98%   BMI 41.67 kg/m²   Physical Exam   Constitutional: She appears well-developed and well-nourished. HENT:   Head: Normocephalic and atraumatic. Neck: Normal range of motion. Neck supple. Cardiovascular: Normal rate, regular rhythm and normal heart sounds. Exam reveals no gallop and no friction rub. Pulmonary/Chest: Effort normal and breath sounds normal. She has no wheezes. She has no rhonchi. She has no rales. Musculoskeletal: She exhibits no edema. Lymphadenopathy:     She has no cervical adenopathy. Skin: Skin is warm and dry. ASSESSMENT and PLAN    ICD-10-CM ICD-9-CM    1. Obesity, Class III, BMI 40-49.9 (morbid obesity) ([facility]) E66.01 278.01 HEMOGLOBIN A1C WITH EAG      LIPID PANEL      METABOLIC PANEL, COMPREHENSIVE   2. Abnormal weight gain R63.5 783.1 phentermine (ADIPEX-P) 37.5 mg tablet     I have reviewed/discussed the above normal BMI with the patient. Discussed the importance of weight loss.   I have recommended the following interventions: dietary management education, guidance, and counseling, encourage exercise, monitor weight and prescribed dietary intake. Diet and exercise hand out given in AVS.  I have discussed the diagnosis with the patient and the intended plan as seen in the above orders. The patient has received an after-visit summary and questions were answered concerning future plans. I have discussed medication side effects and warnings with the patient as well. Patient agreeable with above plan and verbalizes understanding. Follow-up Disposition:  Return in about 3 months (around 5/14/2019) for weight.

## 2019-02-14 NOTE — PROGRESS NOTES
Pt is here for f/u on weight management. 1. Have you been to the ER, urgent care clinic since your last visit? Hospitalized since your last visit? No    2. Have you seen or consulted any other health care providers outside of the [address] [place] [address] since your last visit? Include any pap smears or colon screening.  No

## 2019-02-14 NOTE — PATIENT INSTRUCTIONS
Learning About Dietary Guidelines  What are the Dietary Guidelines for Americans? Dietary Guidelines for Americans provide tips for eating well and staying healthy. This helps reduce the risk for long-term (chronic) diseases. These adult guidelines from the [place] recommend that you:  · Eat lots of fruits, vegetables, whole grains, and low-fat or nonfat dairy products. · Try to balance your eating with your activity. This helps you stay at a healthy weight. · Drink alcohol in moderation, if at all. · Limit foods high in salt, saturated fat, trans fat, and added sugar. What is MyPlate? MyPlate is the U.[place] government's food guide. It can help you make your own well-balanced eating plan. A balanced eating plan means that you eat enough, but not too much, and that your food gives you the nutrients you need to stay healthy. MyPlate focuses on eating plenty of whole grains, fruits, and vegetables, and on limiting fat and sugar. It is available online at www. ChooseMyPlate.gov. How can you get started? MyPlate suggests that most adults eat certain amounts from the different food groups:  Grains  Eat 5 to 8 ounces of grains each day. Half of those should be whole grains. Choose whole-grain breads, cold and cooked cereals and grains, pasta (without creamy sauces), hard rolls, or low-fat or fat-free crackers. Vegetables  Eat 2 to 3 cups of vegetables every day. They contain little if any fat. And they have lots of nutrients that help protect against heart disease. Fruits  Eat 1½ to 2 cups of fruits every day. Fruits contain very little fat but lots of nutrients. Protein foods  Most adults need 5 to 6½ ounces each day. Choose fish and lean poultry more often. Eat red meat and fried meats less often. Dried beans, tofu, and nuts are also good sources of protein. Dairy  Most adults need 3 cups of milk and milk products a day. Choose low-fat or fat-free products from this food group.  If you have problems digesting milk, try eating cheese or yogurt instead. Limit fats and oils, including those used in cooking. When you do use fats, choose oils that are liquid at room temperature (unsaturated fats). These include canola oil and olive oil. Avoid foods with trans fats, such as many fried foods, cookies, and snack foods. Where can you learn more? Go to http://marilynn-gabriela.info/. Enter E311 in the search box to learn more about \"Learning About Dietary Guidelines. \"  Current as of: March 28, 2018  Content Version: 11.9  © 1790-1083 [employer]. Care instructions adapted under license by [email or web address] (which disclaims liability or warranty for this information). If you have questions about a medical condition or this instruction, always ask your healthcare professional. [address] any warranty or liability for your use of this information.

## 2019-03-11 DIAGNOSIS — Z30.09 FAMILY PLANNING: ICD-10-CM

## 2019-03-12 RX ORDER — ETONOGESTREL AND ETHINYL ESTRADIOL .12; .015 MG/D; MG/D
INSERT, EXTENDED RELEASE VAGINAL
Qty: 1 DEVICE | Refills: 12 | Status: SHIPPED | OUTPATIENT
Start: 2019-03-12

## 2019-04-16 ENCOUNTER — TELEPHONE (OUTPATIENT)
Dept: FAMILY MEDICINE CLINIC | Age: 34
End: 2019-04-16

## 2019-04-16 NOTE — TELEPHONE ENCOUNTER
Per DOM Workman, Pt needs to schedule an appointment twith her o go over labs received from Dr Nicole Hyde.

## 2019-05-31 ENCOUNTER — HOSPITAL ENCOUNTER (EMERGENCY)
Age: 34
Discharge: HOME | End: 2019-05-31
Attending: EMERGENCY MEDICINE
Payer: COMMERCIAL

## 2019-05-31 VITALS
SYSTOLIC BLOOD PRESSURE: 133 MMHG | OXYGEN SATURATION: 99 % | BODY MASS INDEX: 41.65 KG/M2 | WEIGHT: 250 LBS | HEIGHT: 65 IN | RESPIRATION RATE: 16 BRPM | TEMPERATURE: 98.7 F | DIASTOLIC BLOOD PRESSURE: 88 MMHG | HEART RATE: 95 BPM

## 2019-05-31 DIAGNOSIS — G56.21 ULNAR NEUROPATHY OF RIGHT UPPER EXTREMITY: Primary | ICD-10-CM

## 2019-05-31 PROCEDURE — 99282 EMERGENCY DEPT VISIT SF MDM: CPT

## 2019-05-31 PROCEDURE — A4565 SLINGS: HCPCS

## 2019-05-31 PROCEDURE — 74011000250 HC RX REV CODE- 250: Performed by: EMERGENCY MEDICINE

## 2019-05-31 RX ORDER — LIDOCAINE 4 G/100G
1 PATCH TOPICAL
Status: DISCONTINUED | OUTPATIENT
Start: 2019-05-31 | End: 2019-06-01 | Stop reason: HOSPADM

## 2019-05-31 RX ORDER — GABAPENTIN 100 MG/1
100 CAPSULE ORAL 3 TIMES DAILY
Qty: 60 CAP | Refills: 0 | Status: SHIPPED | OUTPATIENT
Start: 2019-05-31 | End: 2019-06-10 | Stop reason: ALTCHOICE

## 2019-06-01 NOTE — ED TRIAGE NOTES
Patient states prior hx of right arm pain similar to today's presentation. She c/o right forearm pain that radiates toward right shoulder and into right hand. C/o tingling sensation to right hand. Denies hx of carpal tunnel syndrome.

## 2019-06-01 NOTE — DISCHARGE INSTRUCTIONS
Your evaluation today showed an irritation of your ulnar nerve. Please follow up with a doctor as discussed. The evaluation and treatment done today requires that you follow up with a physician for re-evaluation. Not following up could result in chronic pain/disability/injury. Medical problems can change over time and symptoms can get worse or new symptoms can develop over time, therefore, it is important that you follow up as we discussed or return immedediately to the ER. Diseases don't read textbooks and there are limitations to our evaluation and testing, so please immediately return to the ER if you have any concerns. Call the ER if you have any questions about what we discussed. Please visit [employer] for discounts on any prescriptions you may have. At the [facility] Emergency Department we are genuinely concerned about your health and comfort. You may be selected to participate in a patient satisfaction survey mailed to your home. We are excited about the opportunity to learn from your experience so we may continue to improve. In striving for the very best we believe good is not good enough, but if you rate us as EXCELLENT in all boxes, we have succeeded. We strive to provide EXCELLENT care to you and your family. We appreciate the opportunity to take care of you, and hope you do well.

## 2019-06-01 NOTE — ED PROVIDER NOTES
EMERGENCY DEPARTMENT HISTORY AND PHYSICAL EXAM    11:22 PM  Date: 5/31/2019  Patient Name: Cornell Blackburn    History of Presenting Illness     Chief Complaint   Patient presents with    Hand Pain    Arm Pain        History Provided By: Patient    HPI: Cornell Blackburn is a 35 y.o. female with no relevant past medical history, here for right arm pain and numbness. Patient states that she sleeps on her stomach and for the longest time she is noticed numbness down her right arm. It would be intermittent and self resolve once she woke up and got moving around, but it became more persistent recently. She cannot remember any other possible precipitant to the symptoms, but denies any other associated symptoms such as fever, other more focal motor weakness or any other numbness in other extremities.     Location: Right arm  Severity: Mild  Timing/course: Intermittent  Onset/Duration: Chronic     PCP: Deepti Wan NP    Past History     Past Medical History:  Past Medical History:   Diagnosis Date    Allergic conjunctivitides     allergic conjuctivitis    Anemia NEC     Contact dermatitis and other eczema, due to unspecified cause     HS Bilat axilla    Generalized headaches 4/26/2010    Ill-defined condition     Hydrodenitis    Seasonal allergic rhinitis     Sleep apnea     does not use cpap       Past Surgical History:  Past Surgical History:   Procedure Laterality Date    HX GYN      vaginal delivery x 2    HX OTHER SURGICAL  2010    hydrodenitis removed from arm pit and drained    LAP,CHOLECYSTECTOMY N/A 10/06/2016    Dr. Verlin Koyanagi       Family History:  Family History   Problem Relation Age of Onset    Diabetes Mother     Hypertension Mother     Heart Disease Mother     Stroke Father     Cancer Maternal Aunt         breast    Diabetes Maternal Grandmother        Social History:  Social History     Tobacco Use    Smoking status: Former Smoker    Smokeless tobacco: Never Used   Substance Use Topics    Alcohol use: No    Drug use: No       Allergies: Allergies   Allergen Reactions    Zithromax [Azithromycin] Rash    Percocet [Oxycodone-Acetaminophen] Nausea Only       Review of Systems     Constitutional: No fevers. Eyes: No visual symptoms. ENT: No sore throat, runny nose, or ear pain. Respiratory: No cough, dyspnea, or wheezing. Cardiovascular: No chest pain, palpitations, or heaviness. Gastrointestinal: No nausea, vomiting, diarrhea. Genitourinary: No dysuria, frequency, or urgency. Musculoskeletal: No joint pain or swelling. Integumentary: No rashes. Neurological: Intermittent right arm numbness and pain. All other systems negative. Physical Exam     Patient Vitals for the past 12 hrs:   Temp Pulse Resp BP SpO2   05/31/19 2252 98.7 °F (37.1 °C) 95 16 133/88 99 %       Physical Exam   Constitutional: Appears well-developed. Is not diaphoretic. Eyes: Conjunctiva clear, EOMI  Head: Normocephalic and atraumatic. Neck: Normal range of motion. No stridor or tracheal deviation. Cardiovascular: Intact distal pulses. Pulmonary/Chest: Effort normal and no respiratory distress. Abdominal: Non distended. Musculoskeletal: Normal range of motion. Exhibits no tenderness. Neurological: Conversant, alert. Right arm numbness in the ulnar distribution involving the right pinky and medial side of the right fourth finger, extending to her right forearm. Tenderness to her right ulnar nerve at the elbow. No other focal weakness or impedance to her normal range of motion. Skin: Skin is warm and dry. Psychiatric: Has a normal mood and affect. Behavior is normal.   Nursing note and vitals reviewed. Diagnostic Study Results     Labs -  No results found for this or any previous visit (from the past 12 hour(s)). Radiologic Studies -   No results found. Medical Decision Making     ED Course: Progress Notes, Reevaluation, and Consults:    11:22 PM Initial assessment performed.  The patients presenting problems have been discussed, and they/their family are in agreement with the care plan formulated and outlined with them. I have encouraged them to ask questions as they arise throughout their visit. Provider Notes (Medical Decision Making): Patient presents with what appears to be a right ulnar nerve palsy. This is a chronic issue have been worsening when she slept prone however it has been more persistent usually. Patient is unsure of what might be making it worse, however we will treat her symptomatically here with a lidocaine patch to the affected area, again with a printed prescription in case this helps, and the sling here to help prevent her from making it worse. To be discharged with further instructions for rehab exercises, and referred to orthopedics in case of any other suggestions or treatment. Vital Signs-Reviewed the patient's vital signs. Reviewed pt's pulse ox reading. Records Reviewed: Nursing Notes and Old Medical Records (Time of Review: 11:22 PM)  -I am the first provider for this patient.  -I reviewed the vital signs, available nursing notes, past medical history, past surgical history, family history and social history. Current Facility-Administered Medications   Medication Dose Route Frequency Provider Last Rate Last Dose    lidocaine 4 % patch 1 Patch  1 Patch TransDERmal NOW Mahsa Lam MD         Current Outpatient Medications   Medication Sig Dispense Refill    gabapentin (NEURONTIN) 100 mg capsule Take 1 Cap by mouth three (3) times daily. 60 Cap 0    NUVARING 0.12-0.015 mg/24 hr vaginal ring INSERT 1 RING VAGINALLY FOR 3 WEEKS THEN REMOVE FOR 1 WEEK AND INSERT NEW RING 1 Device 12    montelukast (SINGULAIR) 10 mg tablet Take 1 Tab by mouth daily. 30 Tab 1    albuterol (PROVENTIL HFA, VENTOLIN HFA, PROAIR HFA) 90 mcg/actuation inhaler Take 1 Puff by inhalation every four (4) hours as needed for Wheezing.  1 Inhaler 0        Clinical Impression Clinical Impression:   1.  Ulnar neuropathy of right upper extremity        Disposition: ND home

## 2019-06-10 ENCOUNTER — OFFICE VISIT (OUTPATIENT)
Dept: FAMILY MEDICINE CLINIC | Age: 34
End: 2019-06-10

## 2019-06-10 VITALS
DIASTOLIC BLOOD PRESSURE: 75 MMHG | TEMPERATURE: 99.2 F | OXYGEN SATURATION: 99 % | HEART RATE: 94 BPM | SYSTOLIC BLOOD PRESSURE: 111 MMHG | BODY MASS INDEX: 42.02 KG/M2 | RESPIRATION RATE: 16 BRPM | HEIGHT: 65 IN | WEIGHT: 252.2 LBS

## 2019-06-10 DIAGNOSIS — A69.20 ERYTHEMA MIGRANS (LYME DISEASE): Primary | ICD-10-CM

## 2019-06-10 DIAGNOSIS — R73.03 PREDIABETES: ICD-10-CM

## 2019-06-10 DIAGNOSIS — S30.861A TICK BITE OF ABDOMEN, INITIAL ENCOUNTER: ICD-10-CM
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
## 2019-06-10 RX ORDER — DOXYCYCLINE 100 MG/1
100 TABLET ORAL 2 TIMES DAILY
Qty: 28 TAB | Refills: 0 | Status: SHIPPED | OUTPATIENT
Start: 2019-06-10 | End: 2019-06-24

## 2019-06-10 NOTE — PROGRESS NOTES
Pt is here for possible tick bit that happened 2-3 weeks ago. Pt states her  pulled the insect off her L groin area. Pt states the area has a dark [place] around it & it itches. 1. Have you been to the ER, urgent care clinic since your last visit? Hospitalized since your last visit? Yes 5/31/19 [facility] ED R arm pain    2. Have you seen or consulted any other health care providers outside of the [address] [place] [address] since your last visit? Include any pap smears or colon screening.  no

## 2019-06-10 NOTE — PATIENT INSTRUCTIONS
Lyme Disease: Care Instructions  Your Care Instructions    Lyme disease is a bacterial infection spread by ticks. Antibiotics can treat Lyme disease. If you do not treat Lyme disease, it can lead to problems with your skin, joints, heart, and nervous system. These problems can develop weeks, months, or even years after you get the infection. Your doctor may prescribe antibiotics even if it is not yet certain that you have Lyme disease. Follow-up care is a key part of your treatment and safety. Be sure to make and go to all appointments, and call your doctor if you are having problems. It's also a good idea to know your test results and keep a list of the medicines you take. How can you care for yourself at home? · Take your antibiotics as directed. Do not stop taking them just because you feel better. You need to take the full course of antibiotics. · Take an over-the-counter pain medicine if needed, such as acetaminophen (Tylenol), ibuprofen (Advil, Motrin), or naproxen (Aleve). Read and follow all instructions on the label. To prevent Lyme disease in the future  · Avoid ticks:  ? Learn where ticks are found in your community, and stay away from those areas if possible. ? Cover as much of your body as possible when you work or play in grassy or wooded areas. ? Use insect repellents, such as products containing DEET. You can spray them on your skin. ? Take steps to control ticks on your property if you live in an area where Lyme disease occurs. Clear leaves, brush, tall grasses, woodpiles, and stone fences from around your house and the edges of your yard or garden. This may help get rid of ticks. · When you come in from outdoors, check your body for ticks, including your groin, head, and underarms. The ticks may be about the size of a poppy seed. If no one else can help you check for ticks on your scalp, comb your hair with a fine-tooth comb. · If you find a tick, remove it quickly.  If you can't remove it with your fingers, use tweezers to grasp the tick as close to its mouth (the part in your skin) as possible. Slowly pull the tick straight out--do not twist or yank--until its mouth releases from your skin. If part of the tick stays in the skin, leave it alone. It will likely come out on its own in a few days. · Ticks can come into your house on clothing, outdoor gear, and pets. These ticks can fall off and attach to you. ? Check your clothing and outdoor gear. Remove any ticks you find. Then put your clothing in a clothes dryer on high heat for 1 hour to kill any ticks that might remain. ? Check your pets for ticks after they have been outdoors. · When hiking in the woods, carry a small dry jar or ziplock bag. If you find a tick on your body, remove the tick and put it in the jar or bag. Store the container in the freezer so you can give it to your doctor if symptoms develop. The tick can be tested to learn whether it is carrying the bacteria that cause Lyme disease. When should you call for help? Call your doctor now or seek immediate medical care if:    · You are confused or cannot think clearly.     · You have a headache or stiff neck.     · You have a new or worse rash.     · You have symptoms of infection, such as:  ? Increased pain, swelling, warmth, or redness. ? Red streaks leading from the area. ? Pus draining from the area. ? A fever.    Watch closely for changes in your health, and be sure to contact your doctor if:    · You have new or worse weakness or muscle pain.     · You have new joint pain.     · You do not get better as expected. Where can you learn more? Go to http://marilynn-gabriela.info/. Enter Y599 in the search box to learn more about \"Lyme Disease: Care Instructions. \"  Current as of: July 30, 2018  Content Version: 11.9  © 1120-8617 [employer].  Care instructions adapted under license by [employer] (which disclaims liability or warranty for this information). If you have questions about a medical condition or this instruction, always ask your healthcare professional. [place] [address] any warranty or liability for your use of this information.

## 2019-06-10 NOTE — PROGRESS NOTES
HISTORY OF PRESENT ILLNESS  Magalys Hong is a 35 y.o. female. Patient states she was bite by a tick a couple of weeks ago to left groin area. Comments her  pulled off the tick. However, since she has noticed skin discoloration. Patient states the tick was place for approx. 1 week. Comments she thought it was mole. Allergies   Allergen Reactions    Zithromax [Azithromycin] Rash    Percocet [Oxycodone-Acetaminophen] Nausea Only     Current Outpatient Medications   Medication Sig Dispense Refill    gabapentin (NEURONTIN) 100 mg capsule Take 1 Cap by mouth three (3) times daily. 60 Cap 0    NUVARING 0.12-0.015 mg/24 hr vaginal ring INSERT 1 RING VAGINALLY FOR 3 WEEKS THEN REMOVE FOR 1 WEEK AND INSERT NEW RING 1 Device 12    montelukast (SINGULAIR) 10 mg tablet Take 1 Tab by mouth daily. 30 Tab 1    albuterol (PROVENTIL HFA, VENTOLIN HFA, PROAIR HFA) 90 mcg/actuation inhaler Take 1 Puff by inhalation every four (4) hours as needed for Wheezing.  1 Inhaler 0     Past Medical History:   Diagnosis Date    Allergic conjunctivitides     allergic conjuctivitis    Anemia NEC     Contact dermatitis and other eczema, due to unspecified cause     HS Bilat axilla    Generalized headaches 4/26/2010    Ill-defined condition     Hydrodenitis    Seasonal allergic rhinitis     Sleep apnea     does not use cpap     Social History     Socioeconomic History    Marital status:      Spouse name: Not on file    Number of children: Not on file    Years of education: Not on file    Highest education level: Not on file   Occupational History    Not on file   Social Needs    Financial resource strain: Not on file    Food insecurity:     Worry: Not on file     Inability: Not on file    Transportation needs:     Medical: Not on file     Non-medical: Not on file   Tobacco Use    Smoking status: Former Smoker    Smokeless tobacco: Never Used   Substance and Sexual Activity    Alcohol use: No    Drug use: No    Sexual activity: Yes     Partners: Male     Birth control/protection: None   Lifestyle    Physical activity:     Days per week: Not on file     Minutes per session: Not on file    Stress: Not on file   Relationships    Social connections:     Talks on phone: Not on file     Gets together: Not on file     Attends Yazdanism service: Not on file     Active member of club or organization: Not on file     Attends meetings of clubs or organizations: Not on file     Relationship status: Not on file    Intimate partner violence:     Fear of current or ex partner: Not on file     Emotionally abused: Not on file     Physically abused: Not on file     Forced sexual activity: Not on file   Other Topics Concern    Not on file   Social History Narrative    Not on file     Review of Systems   Constitutional: Negative for chills and fever. Respiratory: Negative for shortness of breath. Cardiovascular: Negative for chest pain and palpitations. Skin:        Tick bite     /75 (BP 1 Location: Left arm)   Pulse 94   Temp 99.2 °F (37.3 °C) (Oral)   Resp 16   Ht 5' 5\" (1.651 m)   Wt 252 lb 3.2 oz (114.4 kg)   LMP 05/17/2019   SpO2 99%   BMI 41.97 kg/m²      Physical Exam   Constitutional: She appears well-developed and well-nourished. HENT:   Head: Normocephalic and atraumatic. Neck: Normal range of motion. Neck supple. Cardiovascular: Normal rate, regular rhythm and normal heart sounds. Exam reveals no gallop and no friction rub. No murmur heard. Pulmonary/Chest: Effort normal and breath sounds normal. She has no wheezes. She has no rhonchi. She has no rales. Lymphadenopathy:     She has no cervical adenopathy. Skin: Skin is warm and dry. Small darkened are with mild surrounding erythema. No drainage, beneath left lower abdominal fold         ASSESSMENT and PLAN    ICD-10-CM ICD-9-CM    1. Erythema migrans (Lyme disease) A69.20 088.81    2.  Tick bite of abdomen, initial encounter Y11.378X 911.4     W57. XXXA E906.4    3. Prediabetes R73.03 790.29      I have discussed the diagnosis with the patient and the intended plan as seen in the above orders. The patient has received an after-visit summary and questions were answered concerning future plans. I have discussed medication side effects and warnings with the patient as well. Patient agreeable with above plan and verbalizes understanding. Follow-up and Dispositions    · Return in about 2 months (around 8/10/2019) for prediabetes.

## 2019-08-23 ENCOUNTER — HOSPITAL ENCOUNTER (OUTPATIENT)
Dept: LAB | Age: 34
Discharge: HOME | End: 2019-08-23
Payer: COMMERCIAL

## 2019-08-23 ENCOUNTER — OFFICE VISIT (OUTPATIENT)
Dept: FAMILY MEDICINE CLINIC | Age: 34
End: 2019-08-23

## 2019-08-23 VITALS
DIASTOLIC BLOOD PRESSURE: 84 MMHG | SYSTOLIC BLOOD PRESSURE: 123 MMHG | RESPIRATION RATE: 17 BRPM | HEIGHT: 65 IN | HEART RATE: 75 BPM | WEIGHT: 264 LBS | OXYGEN SATURATION: 98 % | BODY MASS INDEX: 43.99 KG/M2 | TEMPERATURE: 98.3 F

## 2019-08-23 DIAGNOSIS — N92.6 MISSED MENSES: ICD-10-CM

## 2019-08-23 DIAGNOSIS — R35.0 URINARY FREQUENCY: ICD-10-CM

## 2019-08-23 DIAGNOSIS — R53.83 FATIGUE, UNSPECIFIED TYPE: ICD-10-CM

## 2019-08-23 DIAGNOSIS — R73.03 PREDIABETES: Primary | ICD-10-CM

## 2019-08-23 LAB
ALBUMIN SERPL-MCNC: 3.4 G/DL (ref 3.4–5)
ALBUMIN/GLOB SERPL: 0.8 {RATIO} (ref 0.8–1.7)
ALP SERPL-CCNC: 90 U/L (ref 45–117)
ALT SERPL-CCNC: 21 U/L (ref 13–56)
ANION GAP SERPL CALC-SCNC: 6 MMOL/L (ref 3–18)
AST SERPL-CCNC: 10 U/L (ref 10–38)
BASOPHILS # BLD: 0 K/UL (ref 0–0.1)
BASOPHILS NFR BLD: 0 % (ref 0–2)
BILIRUB SERPL-MCNC: 0.3 MG/DL (ref 0.2–1)
BILIRUB UR QL STRIP: NEGATIVE
BUN SERPL-MCNC: 10 MG/DL (ref 7–18)
BUN/CREAT SERPL: 13 (ref 12–20)
CALCIUM SERPL-MCNC: 8.8 MG/DL (ref 8.5–10.1)
CHLORIDE SERPL-SCNC: 107 MMOL/L (ref 100–111)
CO2 SERPL-SCNC: 25 MMOL/L (ref 21–32)
CREAT SERPL-MCNC: 0.75 MG/DL (ref 0.6–1.3)
DIFFERENTIAL METHOD BLD: ABNORMAL
EOSINOPHIL # BLD: 0.2 K/UL (ref 0–0.4)
EOSINOPHIL NFR BLD: 3 % (ref 0–5)
ERYTHROCYTE [DISTWIDTH] IN BLOOD BY AUTOMATED COUNT: 14.7 % (ref 11.6–14.5)
GLOBULIN SER CALC-MCNC: 4.1 G/DL (ref 2–4)
GLUCOSE SERPL-MCNC: 78 MG/DL (ref 74–99)
GLUCOSE UR-MCNC: NEGATIVE MG/DL
HBA1C MFR BLD HPLC: 5.9 %
HCG SERPL-ACNC: <1 MIU/ML (ref 0–10)
HCG URINE, QL. (POC): NEGATIVE
HCT VFR BLD AUTO: 39 % (ref 35–45)
HGB BLD-MCNC: 12.3 G/DL (ref 12–16)
KETONES P FAST UR STRIP-MCNC: NEGATIVE MG/DL
LYMPHOCYTES # BLD: 3.6 K/UL (ref 0.9–3.6)
LYMPHOCYTES NFR BLD: 49 % (ref 21–52)
MCH RBC QN AUTO: 26.9 PG (ref 24–34)
MCHC RBC AUTO-ENTMCNC: 31.5 G/DL (ref 31–37)
MCV RBC AUTO: 85.2 FL (ref 74–97)
MONOCYTES # BLD: 0.5 K/UL (ref 0.05–1.2)
MONOCYTES NFR BLD: 7 % (ref 3–10)
NEUTS SEG # BLD: 3 K/UL (ref 1.8–8)
NEUTS SEG NFR BLD: 41 % (ref 40–73)
PH UR STRIP: 5.5 [PH] (ref 4.6–8)
PLATELET # BLD AUTO: 273 K/UL (ref 135–420)
PMV BLD AUTO: 10.6 FL (ref 9.2–11.8)
POTASSIUM SERPL-SCNC: 4.6 MMOL/L (ref 3.5–5.5)
PROT SERPL-MCNC: 7.5 G/DL (ref 6.4–8.2)
PROT UR QL STRIP: NEGATIVE
RBC # BLD AUTO: 4.58 M/UL (ref 4.2–5.3)
SODIUM SERPL-SCNC: 138 MMOL/L (ref 136–145)
SP GR UR STRIP: 1.03 (ref 1–1.03)
TSH SERPL DL<=0.05 MIU/L-ACNC: 0.7 UIU/ML (ref 0.36–3.74)
UA UROBILINOGEN AMB POC: NORMAL (ref 0.2–1)
URINALYSIS CLARITY POC: CLEAR
URINALYSIS COLOR POC: YELLOW
URINE BLOOD POC: NORMAL
URINE LEUKOCYTES POC: NORMAL
URINE NITRITES POC: NEGATIVE
VALID INTERNAL CONTROL?: YES
WBC # BLD AUTO: 7.3 K/UL (ref 4.6–13.2)

## 2019-08-23 PROCEDURE — 85025 COMPLETE CBC W/AUTO DIFF WBC: CPT

## 2019-08-23 PROCEDURE — 84702 CHORIONIC GONADOTROPIN TEST: CPT

## 2019-08-23 PROCEDURE — 80053 COMPREHEN METABOLIC PANEL: CPT

## 2019-08-23 PROCEDURE — 82306 VITAMIN D 25 HYDROXY: CPT

## 2019-08-23 PROCEDURE — 87086 URINE CULTURE/COLONY COUNT: CPT

## 2019-08-23 PROCEDURE — 36415 COLL VENOUS BLD VENIPUNCTURE: CPT

## 2019-08-23 PROCEDURE — 84439 ASSAY OF FREE THYROXINE: CPT

## 2019-08-23 PROCEDURE — 84443 ASSAY THYROID STIM HORMONE: CPT

## 2019-08-23 NOTE — PROGRESS NOTES
Chitra Baldwin presents today for   Chief Complaint   Patient presents with    Other     \"diabetic symptoms\" per patient       Is someone accompanying this pt? no    Is the patient using any DME equipment during OV? no    Depression Screening:  3 most recent PHQ Screens 6/10/2019   Little interest or pleasure in doing things Not at all   Feeling down, depressed, irritable, or hopeless Not at all   Total Score PHQ 2 0       Learning Assessment:  Learning Assessment 9/27/2016   PRIMARY LEARNER Patient   PRIMARY LANGUAGE ENGLISH   LEARNER PREFERENCE PRIMARY DEMONSTRATION     -   ANSWERED BY self   RELATIONSHIP SELF       Abuse Screening:  No flowsheet data found. Fall Risk  No flowsheet data found. Health Maintenance reviewed and discussed and ordered per Provider. Health Maintenance Due   Topic Date Due    DTaP/Tdap/Td series (1 - Tdap) 09/17/2006    Influenza Age 5 to Adult  08/01/2019    PAP AKA CERVICAL CYTOLOGY  09/22/2019           Coordination of Care:  1. Have you been to the ER, urgent care clinic since your last visit? Hospitalized since your last visit? no    2. Have you seen or consulted any other health care providers outside of the [address] [place] [address] since your last visit? Include any pap smears or colon screening.  no

## 2019-08-23 NOTE — PATIENT INSTRUCTIONS
Fatigue: Care Instructions  Your Care Instructions    Fatigue is a feeling of tiredness, exhaustion, or lack of energy. You may feel fatigue because of too much or not enough activity. It can also come from stress, lack of sleep, boredom, and poor diet. Many medical problems, such as viral infections, can cause fatigue. Emotional problems, especially depression, are often the cause of fatigue. Fatigue is most often a symptom of another problem. Treatment for fatigue depends on the cause. For example, if you have fatigue because you have a certain health problem, treating this problem also treats your fatigue. If depression or anxiety is the cause, treatment may help. Follow-up care is a key part of your treatment and safety. Be sure to make and go to all appointments, and call your doctor if you are having problems. It's also a good idea to know your test results and keep a list of the medicines you take. How can you care for yourself at home? · Get regular exercise. But don't overdo it. Go back and forth between rest and exercise. · Get plenty of rest.  · Eat a healthy diet. Do not skip meals, especially breakfast.  · Reduce your use of caffeine, tobacco, and alcohol. Caffeine is most often found in coffee, tea, cola drinks, and chocolate. · Limit medicines that can cause fatigue. This includes tranquilizers and cold and allergy medicines. When should you call for help? Watch closely for changes in your health, and be sure to contact your doctor if:    · You have new symptoms such as fever or a rash.     · Your fatigue gets worse.     · You have been feeling down, depressed, or hopeless. Or you may have lost interest in things that you usually enjoy.     · You are not getting better as expected. Where can you learn more? Go to http://marilynn-gabriela.info/. Enter O628 in the search box to learn more about \"Fatigue: Care Instructions. \"  Current as of: September 23, 2018  Content Version: 12.1  © 4400-3778 [employer]. Care instructions adapted under license by [employer] (which disclaims liability or warranty for this information). If you have questions about a medical condition or this instruction, always ask your healthcare professional. [address] any warranty or liability for your use of this information.

## 2019-08-23 NOTE — PROGRESS NOTES
HISTORY OF PRESENT ILLNESS  Donald Valenzuela is a 35 y.o. female. Patient states over the last week she has been having numbness/tingling in fingers. Comments she does have intermittent numbness/tingling for the last year but has not been consistent until this past week. States numbness/tingling only occurs at night when she is in the bed. Tingling is not present during the day. Reports she has tried to reposition or get out of the bed without improvement. Comments she yesterday while shopping she had a sudden onset of overwhelming fatigue. States she felt she had to go home a lay down. Reports she layed down at 1430. States symptoms did not improve until this morning. Comments she does have urinary frequency for the last week. Further reports she has missed her menses which was due on 8/17/19. Allergies   Allergen Reactions    Zithromax [Azithromycin] Rash    Percocet [Oxycodone-Acetaminophen] Nausea Only     Current Outpatient Medications   Medication Sig Dispense Refill    NUVARING 0.12-0.015 mg/24 hr vaginal ring INSERT 1 RING VAGINALLY FOR 3 WEEKS THEN REMOVE FOR 1 WEEK AND INSERT NEW RING 1 Device 12    montelukast (SINGULAIR) 10 mg tablet Take 1 Tab by mouth daily. 30 Tab 1    albuterol (PROVENTIL HFA, VENTOLIN HFA, PROAIR HFA) 90 mcg/actuation inhaler Take 1 Puff by inhalation every four (4) hours as needed for Wheezing.  1 Inhaler 0     Past Medical History:   Diagnosis Date    Allergic conjunctivitides     allergic conjuctivitis    Anemia NEC     Contact dermatitis and other eczema, due to unspecified cause     HS Bilat axilla    Generalized headaches 4/26/2010    Ill-defined condition     Hydrodenitis    Seasonal allergic rhinitis     Sleep apnea     does not use cpap     Social History     Socioeconomic History    Marital status:      Spouse name: Not on file    Number of children: Not on file    Years of education: Not on file    Highest education level: Not on file Occupational History    Not on file   Social Needs    Financial resource strain: Not on file    Food insecurity:     Worry: Not on file     Inability: Not on file    Transportation needs:     Medical: Not on file     Non-medical: Not on file   Tobacco Use    Smoking status: Former Smoker    Smokeless tobacco: Never Used   Substance and Sexual Activity    Alcohol use: No    Drug use: No    Sexual activity: Yes     Partners: Male     Birth control/protection: None   Lifestyle    Physical activity:     Days per week: Not on file     Minutes per session: Not on file    Stress: Not on file   Relationships    Social connections:     Talks on phone: Not on file     Gets together: Not on file     Attends Confucianist service: Not on file     Active member of club or organization: Not on file     Attends meetings of clubs or organizations: Not on file     Relationship status: Not on file    Intimate partner violence:     Fear of current or ex partner: Not on file     Emotionally abused: Not on file     Physically abused: Not on file     Forced sexual activity: Not on file   Other Topics Concern    Not on file   Social History Narrative    Not on file     Wt Readings from Last 3 Encounters:   08/23/19 264 lb (119.7 kg)   06/10/19 252 lb 3.2 oz (114.4 kg)   05/31/19 250 lb (113.4 kg)     BP Readings from Last 3 Encounters:   08/23/19 123/84   06/10/19 111/75   05/31/19 133/88     Review of Systems   Constitutional: Positive for malaise/fatigue. Negative for chills and fever. Respiratory: Negative for shortness of breath. Cardiovascular: Negative for chest pain and palpitations. Gastrointestinal: Negative for abdominal pain. Genitourinary: Positive for frequency (x1 week). Neurological: Positive for tingling. Negative for dizziness and headaches.      /84 (BP 1 Location: Left arm, BP Patient Position: Sitting)   Pulse 75   Temp 98.3 °F (36.8 °C) (Oral)   Resp 17   Ht 5' 5\" (1.651 m)   Wt 264 lb (119.7 kg)   LMP 07/17/2019 (Exact Date)   SpO2 98%   BMI 43.93 kg/m²      Physical Exam   Constitutional: She is oriented to person, place, and time. She appears well-developed and well-nourished. HENT:   Head: Normocephalic and atraumatic. Neck: Normal range of motion. Neck supple. Cardiovascular: Normal rate, regular rhythm and normal heart sounds. Exam reveals no gallop and no friction rub. No murmur heard. Pulmonary/Chest: Effort normal and breath sounds normal. She has no wheezes. She has no rhonchi. She has no rales. Abdominal: Soft. Bowel sounds are normal. There is no tenderness. Musculoskeletal: She exhibits no edema. Positive Phalen's sign to wrist bilaterally   Neurological: She is alert and oriented to person, place, and time. Skin: Skin is warm and dry. ASSESSMENT and PLAN    ICD-10-CM ICD-9-CM    1. Prediabetes R73.03 790.29 AMB POC HEMOGLOBIN A1C   2. Urinary frequency R35.0 788.41 AMB POC URINE PREGNANCY TEST, VISUAL COLOR COMPARISON      AMB POC URINALYSIS DIP STICK AUTO W/O MICRO      CULTURE, URINE   3. Fatigue, unspecified type R53.83 780.79 CBC WITH AUTOMATED DIFF      METABOLIC PANEL, COMPREHENSIVE      TSH 3RD GENERATION      T4, FREE      VITAMIN D, 25 HYDROXY   4. Missed menses N92.6 626.4 BETA HCG, QT     Orders Placed This Encounter    CULTURE, URINE    CBC WITH AUTOMATED DIFF    METABOLIC PANEL, COMPREHENSIVE    TSH 3RD GENERATION    T4, FREE    VITAMIN D, 25 HYDROXY    BETA HCG, QT    AMB POC HEMOGLOBIN A1C    AMB POC URINE PREGNANCY TEST, VISUAL COLOR COMPARISON    AMB POC URINALYSIS DIP STICK AUTO W/O MICRO     I have discussed the diagnosis with the patient and the intended plan as seen in the above orders. The patient has received an after-visit summary and questions were answered concerning future plans. I have discussed medication side effects and warnings with the patient as well.  Patient agreeable with above plan and verbalizes understanding. Follow-up and Dispositions    · Return in about 3 weeks (around 9/13/2019) for fatigue .

## 2019-08-24 LAB
25(OH)D3 SERPL-MCNC: 18.1 NG/ML (ref 30–100)
BACTERIA SPEC CULT: NORMAL
SERVICE CMNT-IMP: NORMAL
T4 FREE SERPL-MCNC: 1.3 NG/DL (ref 0.7–1.5)

## 2019-08-26 ENCOUNTER — TELEPHONE (OUTPATIENT)
Dept: FAMILY MEDICINE CLINIC | Age: 34
End: 2019-08-26

## 2019-08-26 DIAGNOSIS — E55.9 VITAMIN D DEFICIENCY: Primary | ICD-10-CM

## 2019-08-26 RX ORDER — ERGOCALCIFEROL 1.25 MG/1
50000 CAPSULE ORAL
Qty: 4 CAP | Refills: 2 | Status: SHIPPED | OUTPATIENT
Start: 2019-08-26 | End: 2021-01-27 | Stop reason: ALTCHOICE

## 2019-08-26 NOTE — TELEPHONE ENCOUNTER
----- Message from Tonny Will NP sent at 8/26/2019  6:49 AM EDT -----  Inform pt that vitamin D level was low. A prescription for 50,000 units of Vitamin D has been sent to the pharmacy on file. Patient is to take once a week for 12 weeks. Patient will need to come in to have lab done in about 14 weeks. All other labs were normal.  Thanks!

## 2019-12-03 RX ORDER — IBUPROFEN 800 MG/1
TABLET ORAL
Qty: 90 TAB | Refills: 0 | Status: SHIPPED | OUTPATIENT
Start: 2019-12-03 | End: 2021-01-27 | Stop reason: ALTCHOICE

## 2020-06-12 ENCOUNTER — HOSPITAL ENCOUNTER (OUTPATIENT)
Dept: ULTRASOUND IMAGING | Age: 35
Discharge: HOME | End: 2020-06-12
Attending: INTERNAL MEDICINE
Payer: COMMERCIAL

## 2020-06-12 ENCOUNTER — HOSPITAL ENCOUNTER (OUTPATIENT)
Dept: LAB | Age: 35
Discharge: HOME | End: 2020-06-12
Payer: COMMERCIAL

## 2020-06-12 DIAGNOSIS — K59.09 CONSTIPATION, CHRONIC: ICD-10-CM

## 2020-06-12 DIAGNOSIS — R11.0 NAUSEA: ICD-10-CM

## 2020-06-12 DIAGNOSIS — R10.30 ABDOMINAL PAIN, LOWER: ICD-10-CM

## 2020-06-12 DIAGNOSIS — R10.11 ABDOMINAL PAIN, RUQ: ICD-10-CM

## 2020-06-12 DIAGNOSIS — R10.819 ABDOMINAL TENDERNESS: ICD-10-CM

## 2020-06-12 DIAGNOSIS — A04.9: ICD-10-CM

## 2020-06-12 LAB
25(OH)D3 SERPL-MCNC: 33.5 NG/ML (ref 30–100)
ALBUMIN SERPL-MCNC: 3.5 G/DL (ref 3.4–5)
ALBUMIN/GLOB SERPL: 0.9 {RATIO} (ref 0.8–1.7)
ALP SERPL-CCNC: 91 U/L (ref 45–117)
ALT SERPL-CCNC: 22 U/L (ref 13–56)
AST SERPL-CCNC: 10 U/L (ref 10–38)
BASOPHILS # BLD: 0 K/UL (ref 0–0.1)
BASOPHILS NFR BLD: 1 % (ref 0–2)
BILIRUB DIRECT SERPL-MCNC: <0.1 MG/DL (ref 0–0.2)
BILIRUB SERPL-MCNC: 0.9 MG/DL (ref 0.2–1)
DIFFERENTIAL METHOD BLD: ABNORMAL
EOSINOPHIL # BLD: 0.5 K/UL (ref 0–0.4)
EOSINOPHIL NFR BLD: 8 % (ref 0–5)
ERYTHROCYTE [DISTWIDTH] IN BLOOD BY AUTOMATED COUNT: 14.7 % (ref 11.6–14.5)
GLOBULIN SER CALC-MCNC: 4 G/DL (ref 2–4)
HCT VFR BLD AUTO: 36.7 % (ref 35–45)
HGB BLD-MCNC: 11.9 G/DL (ref 12–16)
LYMPHOCYTES # BLD: 3.2 K/UL (ref 0.9–3.6)
LYMPHOCYTES NFR BLD: 50 % (ref 21–52)
MCH RBC QN AUTO: 26.4 PG (ref 24–34)
MCHC RBC AUTO-ENTMCNC: 32.4 G/DL (ref 31–37)
MCV RBC AUTO: 81.6 FL (ref 74–97)
MONOCYTES # BLD: 0.4 K/UL (ref 0.05–1.2)
MONOCYTES NFR BLD: 6 % (ref 3–10)
NEUTS SEG # BLD: 2.2 K/UL (ref 1.8–8)
NEUTS SEG NFR BLD: 35 % (ref 40–73)
PLATELET # BLD AUTO: 274 K/UL (ref 135–420)
PMV BLD AUTO: 10.9 FL (ref 9.2–11.8)
PROT SERPL-MCNC: 7.5 G/DL (ref 6.4–8.2)
RBC # BLD AUTO: 4.5 M/UL (ref 4.2–5.3)
WBC # BLD AUTO: 6.4 K/UL (ref 4.6–13.2)

## 2020-06-12 PROCEDURE — 76705 ECHO EXAM OF ABDOMEN: CPT

## 2020-06-12 PROCEDURE — 80076 HEPATIC FUNCTION PANEL: CPT

## 2020-06-12 PROCEDURE — 82306 VITAMIN D 25 HYDROXY: CPT

## 2020-06-12 PROCEDURE — 36415 COLL VENOUS BLD VENIPUNCTURE: CPT

## 2020-06-12 PROCEDURE — 85025 COMPLETE CBC W/AUTO DIFF WBC: CPT

## 2020-06-19 ENCOUNTER — HOSPITAL ENCOUNTER (OUTPATIENT)
Dept: LAB | Age: 35
Discharge: HOME | End: 2020-06-19
Payer: COMMERCIAL

## 2020-06-19 ENCOUNTER — OFFICE VISIT (OUTPATIENT)
Dept: OBGYN CLINIC | Age: 35
End: 2020-06-19

## 2020-06-19 VITALS
HEART RATE: 75 BPM | BODY MASS INDEX: 45.48 KG/M2 | DIASTOLIC BLOOD PRESSURE: 69 MMHG | WEIGHT: 273 LBS | HEIGHT: 65 IN | SYSTOLIC BLOOD PRESSURE: 108 MMHG

## 2020-06-19 DIAGNOSIS — Z30.09 FAMILY PLANNING: ICD-10-CM

## 2020-06-19 DIAGNOSIS — Z01.419 WELL WOMAN EXAM WITH ROUTINE GYNECOLOGICAL EXAM: Primary | ICD-10-CM

## 2020-06-19 LAB
HCG URINE, QL. (POC): NEGATIVE
VALID INTERNAL CONTROL?: YES

## 2020-06-19 PROCEDURE — 87624 HPV HI-RISK TYP POOLED RSLT: CPT

## 2020-06-19 PROCEDURE — 88175 CYTOPATH C/V AUTO FLUID REDO: CPT

## 2020-06-19 RX ORDER — ETONOGESTREL AND ETHINYL ESTRADIOL 11.7; 2.7 MG/1; MG/1
INSERT, EXTENDED RELEASE VAGINAL
Qty: 3 DEVICE | Refills: 5 | Status: SHIPPED | OUTPATIENT
Start: 2020-06-19 | End: 2021-01-27 | Stop reason: SDUPTHER

## 2020-06-19 NOTE — PROGRESS NOTES
Subjective:   29 y.o. female for Well Woman Check. She desires an updated pap smear and a refill on contraception. Patient's last menstrual period was 06/03/2020. Social History: single partner, contraception - none. Patient Active Problem List   Diagnosis Code    Generalized headaches R51    Seasonal allergic rhinitis J30.2    Obesity, morbid ([facility] [address] E66.01     Patient Active Problem List    Diagnosis Date Noted    Obesity, morbid ([facility] [address] 01/03/2018    Generalized headaches 04/26/2010    Seasonal allergic rhinitis 04/26/2010     Current Outpatient Medications   Medication Sig Dispense Refill    ethinyl estradiol-etonogestrel (NUVARING) 0.12-0.015 mg/24 hr vaginal ring 1 ring vaginally x 3 weeks, then remove 3 Device 5    ibuprofen (MOTRIN) 800 mg tablet TAKE 1 TABLET BY MOUTH THREE TIMES DAILY WITH MEALS FOR PAIN 90 Tab 0    ergocalciferol (ERGOCALCIFEROL) 50,000 unit capsule Take 1 Cap by mouth every seven (7) days. 4 Cap 2    NUVARING 0.12-0.015 mg/24 hr vaginal ring INSERT 1 RING VAGINALLY FOR 3 WEEKS THEN REMOVE FOR 1 WEEK AND INSERT NEW RING 1 Device 12    montelukast (SINGULAIR) 10 mg tablet Take 1 Tab by mouth daily. 30 Tab 1    albuterol (PROVENTIL HFA, VENTOLIN HFA, PROAIR HFA) 90 mcg/actuation inhaler Take 1 Puff by inhalation every four (4) hours as needed for Wheezing.  1 Inhaler 0     Allergies   Allergen Reactions    Zithromax [Azithromycin] Rash    Percocet [Oxycodone-Acetaminophen] Nausea Only     Past Medical History:   Diagnosis Date    Allergic conjunctivitides     allergic conjuctivitis    Anemia NEC     Contact dermatitis and other eczema, due to unspecified cause     HS Bilat axilla    Generalized headaches 4/26/2010    Ill-defined condition     Hydrodenitis    Seasonal allergic rhinitis     Sleep apnea     does not use cpap     Past Surgical History:   Procedure Laterality Date    HX GYN      vaginal delivery x 2    HX OTHER SURGICAL  2010    hydrodenitis removed from arm pit and drained    LAP,CHOLECYSTECTOMY N/A 10/06/2016    Dr. Jona Luis     Family History   Problem Relation Age of Onset    Diabetes Mother     Hypertension Mother     Heart Disease Mother     Stroke Father     Cancer Maternal Aunt         breast    Breast Cancer Maternal Aunt     Diabetes Maternal Grandmother     Breast Cancer Maternal Grandmother      Social History     Tobacco Use    Smoking status: Former Smoker    Smokeless tobacco: Never Used   Substance Use Topics    Alcohol use: No        ROS:  Feeling well. No dyspnea or chest pain on exertion. No abdominal pain, change in bowel habits, black or bloody stools. No urinary tract symptoms. GYN ROS: normal menses, no abnormal bleeding, pelvic pain or discharge, no breast pain or new or enlarging lumps on self exam. No neurological complaints. Objective:     Visit Vitals  /69   Pulse 75   Ht 5' 5\" (1.651 m)   Wt 273 lb (123.8 kg)   LMP 06/03/2020   BMI 45.43 kg/m²     The patient appears well, alert, oriented x 3, in no distress. ENT normal.  Neck supple. No adenopathy or thyromegaly. SERGIO. Lungs are clear, good air entry, no wheezes, rhonchi or rales. S1 and S2 normal, no murmurs, regular rate and rhythm. Abdomen soft without tenderness, guarding, mass or organomegaly. Extremities show no edema, normal peripheral pulses. Neurological is normal, no focal findings. BREAST EXAM: breasts appear normal, no suspicious masses, no skin or nipple changes or axillary nodes    PELVIC EXAM: normal external genitalia, vulva, vagina, cervix, uterus and adnexa    Assessment/Plan:   well woman  no contraindication to begin hormonal therapy  pap smear  return annually or prn    ICD-10-CM ICD-9-CM    1. Well woman exam with routine gynecological exam Z01.419 V72.31 PAP IG, APTIMA HPV AND RFX 16/18,45 ([id number])   2.  Family planning Z30.09 V25.09 AMB POC URINE PREGNANCY TEST, VISUAL COLOR COMPARISON      ethinyl estradiol-etonogestrel (NUVARING) 0. 12-0.015 mg/24 hr vaginal ring     Encounter Diagnoses   Name Primary?  Well woman exam with routine gynecological exam Yes    Family planning      Orders Placed This Encounter    AMB POC URINE PREGNANCY TEST, VISUAL COLOR COMPARISON    ethinyl estradiol-etonogestrel (NUVARING) 0.12-0.015 mg/24 hr vaginal ring    PAP IG, APTIMA HPV AND RFX 16/18,45 ([id number])     Follow-up and Dispositions    · Return in about 1 year (around 6/19/2021), or if symptoms worsen or fail to improve, for Annual Exam or prn. Floyd Box

## 2020-07-21 ENCOUNTER — HOSPITAL ENCOUNTER (OUTPATIENT)
Dept: LAB | Age: 35
Discharge: HOME | End: 2020-07-21
Payer: COMMERCIAL

## 2020-07-21 LAB
ALBUMIN SERPL-MCNC: 3.7 G/DL (ref 3.4–5)
ALBUMIN/GLOB SERPL: 0.9 {RATIO} (ref 0.8–1.7)
ALP SERPL-CCNC: 104 U/L (ref 45–117)
ALT SERPL-CCNC: 28 U/L (ref 13–56)
ANION GAP SERPL CALC-SCNC: 4 MMOL/L (ref 3–18)
AST SERPL-CCNC: 11 U/L (ref 10–38)
BILIRUB SERPL-MCNC: 0.3 MG/DL (ref 0.2–1)
BUN SERPL-MCNC: 10 MG/DL (ref 7–18)
BUN/CREAT SERPL: 13 (ref 12–20)
CALCIUM SERPL-MCNC: 9.1 MG/DL (ref 8.5–10.1)
CHLORIDE SERPL-SCNC: 107 MMOL/L (ref 100–111)
CO2 SERPL-SCNC: 27 MMOL/L (ref 21–32)
CREAT SERPL-MCNC: 0.78 MG/DL (ref 0.6–1.3)
ERYTHROCYTE [DISTWIDTH] IN BLOOD BY AUTOMATED COUNT: 14.8 % (ref 11.6–14.5)
FERRITIN SERPL-MCNC: 34 NG/ML (ref 8–388)
GLOBULIN SER CALC-MCNC: 4.3 G/DL (ref 2–4)
GLUCOSE SERPL-MCNC: 102 MG/DL (ref 74–99)
HCT VFR BLD AUTO: 39.1 % (ref 35–45)
HGB BLD-MCNC: 12.6 G/DL (ref 12–16)
IRON SATN MFR SERPL: 9 % (ref 20–50)
IRON SERPL-MCNC: 28 UG/DL (ref 50–175)
MCH RBC QN AUTO: 26.6 PG (ref 24–34)
MCHC RBC AUTO-ENTMCNC: 32.2 G/DL (ref 31–37)
MCV RBC AUTO: 82.7 FL (ref 74–97)
PLATELET # BLD AUTO: 281 K/UL (ref 135–420)
PMV BLD AUTO: 11.3 FL (ref 9.2–11.8)
POTASSIUM SERPL-SCNC: 3.9 MMOL/L (ref 3.5–5.5)
PROT SERPL-MCNC: 8 G/DL (ref 6.4–8.2)
RBC # BLD AUTO: 4.73 M/UL (ref 4.2–5.3)
SODIUM SERPL-SCNC: 138 MMOL/L (ref 136–145)
TIBC SERPL-MCNC: 299 UG/DL (ref 250–450)
TSH SERPL DL<=0.05 MIU/L-ACNC: 1.08 UIU/ML (ref 0.36–3.74)
WBC # BLD AUTO: 7 K/UL (ref 4.6–13.2)

## 2020-07-21 PROCEDURE — 84443 ASSAY THYROID STIM HORMONE: CPT

## 2020-07-21 PROCEDURE — 82728 ASSAY OF FERRITIN: CPT

## 2020-07-21 PROCEDURE — 83540 ASSAY OF IRON: CPT

## 2020-07-21 PROCEDURE — 85027 COMPLETE CBC AUTOMATED: CPT

## 2020-07-21 PROCEDURE — 80053 COMPREHEN METABOLIC PANEL: CPT

## 2020-07-21 PROCEDURE — 36415 COLL VENOUS BLD VENIPUNCTURE: CPT

## 2020-09-08 NOTE — TELEPHONE ENCOUNTER
Last Visit: 8/23/19 with NP Cintia Belcher  Next Appointment: none  Previous Refill Encounter(s): 8/26/19 #4 with 2 refills    Requested Prescriptions     Pending Prescriptions Disp Refills    ergocalciferol (ERGOCALCIFEROL) 1,250 mcg (50,000 unit) capsule 4 Cap 2     Sig: Take 1 Cap by mouth every seven (7) days.

## 2020-09-10 RX ORDER — ERGOCALCIFEROL 1.25 MG/1
50000 CAPSULE ORAL
Qty: 4 CAP | Refills: 2 | OUTPATIENT
Start: 2020-09-10

## 2021-01-26 ENCOUNTER — TELEPHONE (OUTPATIENT)
Dept: FAMILY MEDICINE CLINIC | Age: 36
End: 2021-01-26

## 2021-01-26 NOTE — TELEPHONE ENCOUNTER
Pt called states having tingling and numbness in hands, along with headaches. Forwarded to nurse for triage.  Please adv [phone]

## 2021-01-27 ENCOUNTER — VIRTUAL VISIT (OUTPATIENT)
Dept: FAMILY MEDICINE CLINIC | Age: 36
End: 2021-01-27
Payer: COMMERCIAL

## 2021-01-27 DIAGNOSIS — M25.531 RIGHT WRIST PAIN: Primary | ICD-10-CM

## 2021-01-27 PROCEDURE — 99212 OFFICE O/P EST SF 10 MIN: CPT | Performed by: NURSE PRACTITIONER

## 2021-01-27 NOTE — PROGRESS NOTES
Zoya Moyer is a 28 y.o. female who was seen by synchronous (real-time) audio-video technology on 1/27/2021 for Wrist Pain (right)      Assessment & Plan:   Diagnoses and all orders for this visit:    1. Right wrist pain  -     REFERRAL TO ORTHOPEDICS      Contact information provided via [employer] for patient to contact ortho to schedule appt. Follow-up and Dispositions    · Return if symptoms worsen or fail to improve. I spent at least 10 minutes on this visit with this established patient. 712  Subjective:   Comments she has been having right hand/forearm pain tingling for the last several months. Comments over the last couple of weeks pain has been increasing. Admits to weakness with  at times. Reports pain has been waking her up out of her sleep due to pain. Comments she tried a carpal tunnel brace without improvement. Reports she also has tried elevating hand at night on pillows or sleeping in the recliner without improvement. Prior to Admission medications    Medication Sig Start Date End Date Taking? Authorizing Provider   ethinyl estradiol-etonogestrel (NUVARING) 0.12-0.015 mg/24 hr vaginal ring 1 ring vaginally x 3 weeks, then remove 6/19/20   Karla Alcala MD   ibuprofen (MOTRIN) 800 mg tablet TAKE 1 TABLET BY MOUTH THREE TIMES DAILY WITH MEALS FOR PAIN 12/3/19   Meggan Palmer PA-C   ergocalciferol (ERGOCALCIFEROL) 50,000 unit capsule Take 1 Cap by mouth every seven (7) days. 8/26/19   Will RICO NP   NUVARING 0.12-0.015 mg/24 hr vaginal ring INSERT 1 RING VAGINALLY FOR 3 WEEKS THEN REMOVE FOR 1 WEEK AND INSERT NEW RING 3/12/19   Jeff Patterson MD   montelukast (SINGULAIR) 10 mg tablet Take 1 Tab by mouth daily.  6/20/18   True Wills NP   albuterol (PROVENTIL HFA, VENTOLIN HFA, PROAIR HFA) 90 mcg/actuation inhaler Take 1 Puff by inhalation every four (4) hours as needed for Wheezing. 6/20/18   True Wills NP     Patient Active Problem List Diagnosis Code    Generalized headaches R51.9    Seasonal allergic rhinitis J30.2    Obesity, morbid ([facility] E66.01     Patient Active Problem List    Diagnosis Date Noted    Obesity, morbid ([facility] 01/03/2018    Generalized headaches 04/26/2010    Seasonal allergic rhinitis 04/26/2010     Current Outpatient Medications   Medication Sig Dispense Refill    NUVARING 0.12-0.015 mg/24 hr vaginal ring INSERT 1 RING VAGINALLY FOR 3 WEEKS THEN REMOVE FOR 1 WEEK AND INSERT NEW RING 1 Device 12     Allergies   Allergen Reactions    Zithromax [Azithromycin] Rash    Percocet [Oxycodone-Acetaminophen] Nausea Only     Past Medical History:   Diagnosis Date    Allergic conjunctivitides     allergic conjuctivitis    Anemia NEC     Contact dermatitis and other eczema, due to unspecified cause     HS Bilat axilla    Generalized headaches 4/26/2010    Ill-defined condition     Hydrodenitis    Seasonal allergic rhinitis     Sleep apnea     does not use cpap     Past Surgical History:   Procedure Laterality Date    HX GYN      vaginal delivery x 2    HX OTHER SURGICAL  2010    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 Charlene Pollard     Family History   Problem Relation Age of Onset    Diabetes Mother     Hypertension Mother     Heart Disease Mother     Stroke Father     Cancer Maternal Aunt         breast    Breast Cancer Maternal Aunt     Diabetes Maternal Grandmother     Breast Cancer Maternal Grandmother      Social History     Tobacco Use    Smoking status: Former Smoker    Smokeless tobacco: Never Used   Substance Use Topics    Alcohol use: No       ROS    Objective:   No flowsheet data found.    General: alert, cooperative, no distress   Mental  status: normal mood, behavior, speech, dress, motor activity, and thought processes, able to follow commands   HENT: NCAT   Neck: no visualized mass   Resp: no respiratory distress   Neuro: no gross deficits   Skin: no discoloration or lesions of concern on visible areas   Psychiatric: normal affect, consistent with stated mood, no evidence of hallucinations     Additional exam findings: We discussed the expected course, resolution and complications of the diagnosis(es) in detail. Medication risks, benefits, costs, interactions, and alternatives were discussed as indicated. I advised her to contact the office if her condition worsens, changes or fails to improve as anticipated. She expressed understanding with the diagnosis(es) and plan. Josh Kelly, who was evaluated through a patient-initiated, synchronous (real-time) audio-video encounter, and/or her healthcare decision maker, is aware that it is a billable service, with coverage as determined by her insurance carrier. She provided verbal consent to proceed: Yes, and patient identification was verified. It was conducted pursuant to the emergency declaration under the [facility], [address] [place] [address] authority and the [employer] Act. A caregiver was present when appropriate. Ability to conduct physical exam was limited. I was in the office. The patient was at home.       Nisha Shepard NP

## 2021-02-18 ENCOUNTER — OFFICE VISIT (OUTPATIENT)
Dept: ORTHOPEDIC SURGERY | Age: 36
End: 2021-02-18
Payer: COMMERCIAL

## 2021-02-18 VITALS
RESPIRATION RATE: 16 BRPM | OXYGEN SATURATION: 88 % | TEMPERATURE: 97.1 F | WEIGHT: 274 LBS | HEART RATE: 35 BPM | BODY MASS INDEX: 45.65 KG/M2 | HEIGHT: 65 IN

## 2021-02-18 DIAGNOSIS — G56.21 CUBITAL TUNNEL SYNDROME, RIGHT: ICD-10-CM

## 2021-02-18 DIAGNOSIS — G56.03 BILATERAL CARPAL TUNNEL SYNDROME: Primary | ICD-10-CM

## 2021-02-18 PROCEDURE — 99203 OFFICE O/P NEW LOW 30 MIN: CPT | Performed by: ORTHOPAEDIC SURGERY

## 2021-02-18 PROCEDURE — 20526 THER INJECTION CARP TUNNEL: CPT | Performed by: ORTHOPAEDIC SURGERY

## 2021-02-18 NOTE — PROGRESS NOTES
Claudia Evans is a 28 y.o. female right handed unspecified employment. Worker's Compensation and legal considerations: none filed. Vitals:    02/18/21 1506   Pulse: (!) 35   Resp: 16   Temp: 97.1 °F (36.2 °C)   TempSrc: Temporal   SpO2: (!) 88%   Weight: 274 lb (124.3 kg)   Height: 5' 5\" (1.651 m)   PainSc:   5   PainLoc: Wrist           Chief Complaint   Patient presents with    Wrist Pain     right wrist pain         HPI: Patient presents today with complaints right wrist pain numbness and tingling with occasional left-sided numbness and tingling.     Date of onset: Indeterminate    Injury: No    Prior Treatment:  No    Numbness/ Tingling: Yes: Comment: Right much worse than left      ROS: Review of Systems - General ROS: negative  Psychological ROS: negative  ENT ROS: negative  Allergy and Immunology ROS: negative  Hematological and Lymphatic ROS: negative  Respiratory ROS: no cough, shortness of breath, or wheezing  Cardiovascular ROS: no chest pain or dyspnea on exertion  Gastrointestinal ROS: no abdominal pain, change in bowel habits, or black or bloody stools  Musculoskeletal ROS: positive for - pain in arm - right  Neurological ROS: positive for - numbness/tingling  Dermatological ROS: negative    Past Medical History:   Diagnosis Date    Allergic conjunctivitides     allergic conjuctivitis    Anemia NEC     Contact dermatitis and other eczema, due to unspecified cause     HS Bilat axilla    Generalized headaches 4/26/2010    Ill-defined condition     Hydrodenitis    Seasonal allergic rhinitis     Sleep apnea     does not use cpap       Past Surgical History:   Procedure Laterality Date    HX GYN      vaginal delivery x 2    HX OTHER SURGICAL  2010    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 Sarah Guido       Current Outpatient Medications   Medication Sig Dispense Refill    NUVARING 0.12-0.015 mg/24 hr vaginal ring INSERT 1 RING VAGINALLY FOR 3 WEEKS THEN REMOVE FOR 1 WEEK AND INSERT NEW RING 1 Device 12       Allergies   Allergen Reactions    Zithromax [Azithromycin] Rash    Percocet [Oxycodone-Acetaminophen] Nausea Only           PE:     Physical Exam  Vitals signs and nursing note reviewed. Constitutional:       General: She is not in acute distress. Appearance: Normal appearance. She is not ill-appearing. Eyes:      Extraocular Movements: Extraocular movements intact. Pupils: Pupils are equal, round, and reactive to light. Neck:      Musculoskeletal: Normal range of motion and neck supple. Cardiovascular:      Pulses: Normal pulses. Pulmonary:      Effort: Pulmonary effort is normal. No respiratory distress. Abdominal:      General: Abdomen is flat. There is no distension. Musculoskeletal: Normal range of motion. General: No swelling, tenderness, deformity or signs of injury. Right lower leg: No edema. Left lower leg: No edema. Skin:     General: Skin is warm and dry. Capillary Refill: Capillary refill takes less than 2 seconds. Findings: No bruising or erythema. Neurological:      General: No focal deficit present. Mental Status: She is alert and oriented to person, place, and time. Cranial Nerves: No cranial nerve deficit. Sensory: No sensory deficit. Psychiatric:         Mood and Affect: Mood normal.         Behavior: Behavior normal.            NEUROVASCULAR    Examination L R Examination L R   Carpal Comp. + ++ Pronator Comp. - -   Carpal Tinel + ++ Pronator Tinel - -   Phalen's + + Pronator Stress - -   Cubital Comp. - - Guyon Comp. - -   Cubital Tinel - + Guyon Tinel - -   Elbow Hyperflexion - - Adson's - -   Spurling's - - SC Comp. - -   PCB Median abn - - SC Tinel - -   Radial Tinel - - IC Comp.  - -   Digital Tinel - - IC Tinel - -   Radial 2-Pt WNL WNL Ulnar 2-Pt WNL WNL     Radial Pulse: 2+  Capillary Refill: < 2 sec  Eduardo: Not Performed  [employer]: Not Performed        Imaging:     None indicated        ICD-10-CM ICD-9-CM    1. Bilateral carpal tunnel syndrome  G56.03 354.0 triamcinolone acetonide (KENALOG) 10 mg/mL injection 10 mg      INJECT CARPAL TUNNEL      AMB SUPPLY ORDER      EMG TWO EXTREMITIES UPPER      NCV/LAT SENSORY PER NERVE UP/LT   2. Cubital tunnel syndrome, right  G56.21 354. 2 EMG TWO EXTREMITIES UPPER      NCV/LAT SENSORY PER NERVE UP/RT         Plan:     Bilateral carpal tunnel injections, carpal tunnel braces, upper extremity EMGs. Follow-up and Dispositions    · Return for EMG review. Plan was reviewed with patient, who verbalized agreement and understanding of the plan    2042 [facility] NOTE        Chart reviewed for the following:   German JACOBS DO, have reviewed the History, Physical and updated the Allergic reactions for Greyson Florence ANA MARÍA [address] performed immediately prior to start of procedure:   Ta JACOBS DO, have performed the following reviews on [place] prior to the start of the procedure:            * Patient was identified by name and date of birth   * Agreement on procedure being performed was verified  * Risks and Benefits explained to the patient  * Procedure site verified and marked as necessary  * Patient was positioned for comfort  * Consent was signed and verified     Time: 15:31      Date of procedure: 2/18/2021    Procedure performed by: Ta Noe DO    Provider assisted by: Zak JACK    Patient assisted by: self    How tolerated by patient: tolerated the procedure well with no complications    Post Procedural Pain Scale: 0 - No Hurt    Comments: none    Procedure:  After consent was obtained, using sterile technique the carpal tunnel was prepped. Local anesthetic used: 1% lidocaine. Kenalog 5 mg X2 and was then injected and the needle withdrawn. The procedure was well tolerated.   The patient is asked to continue to rest the area for a few more days before resuming regular activities. It may be more painful for the first 1-2 days. Watch for fever, or increased swelling or persistent pain in the joint. Call or return to clinic prn if such symptoms occur or there is failure to improve as anticipated.

## 2021-03-01 ENCOUNTER — OFFICE VISIT (OUTPATIENT)
Dept: ORTHOPEDIC SURGERY | Age: 36
End: 2021-03-01
Payer: COMMERCIAL

## 2021-03-01 VITALS
WEIGHT: 272.6 LBS | TEMPERATURE: 97.8 F | HEART RATE: 94 BPM | HEIGHT: 65 IN | SYSTOLIC BLOOD PRESSURE: 114 MMHG | DIASTOLIC BLOOD PRESSURE: 75 MMHG | RESPIRATION RATE: 14 BRPM | OXYGEN SATURATION: 100 % | BODY MASS INDEX: 45.42 KG/M2

## 2021-03-01 DIAGNOSIS — R20.2 NUMBNESS AND TINGLING IN BOTH HANDS: Primary | ICD-10-CM

## 2021-03-01 DIAGNOSIS — R20.2 NUMBNESS AND TINGLING IN BOTH HANDS: ICD-10-CM

## 2021-03-01 DIAGNOSIS — R20.0 NUMBNESS AND TINGLING IN BOTH HANDS: ICD-10-CM

## 2021-03-01 DIAGNOSIS — R20.0 NUMBNESS AND TINGLING IN BOTH HANDS: Primary | ICD-10-CM

## 2021-03-01 PROCEDURE — 95911 NRV CNDJ TEST 9-10 STUDIES: CPT | Performed by: PHYSICAL MEDICINE & REHABILITATION

## 2021-03-01 PROCEDURE — 95886 MUSC TEST DONE W/N TEST COMP: CPT | Performed by: PHYSICAL MEDICINE & REHABILITATION

## 2021-03-01 NOTE — PROGRESS NOTES
[address]  [address], [address]  [address]  Phone: [phone]  Fax: [phone]        Arik Calabrese  : 1985  PCP: April Gama NP  3/1/2021    ELECTROMYOGRAPHY AND NERVE CONDUCTION STUDIES    Ángel Pandey was referred by Dr. Gogo Celeste for electrodiagnostic evaluation of bilateral hand numbness and tingling. NCV & EMG Findings:  Evaluation of the left median motor nerve showed decreased conduction velocity (Elbow-Wrist, 49 m/s). The right median motor nerve showed prolonged distal onset latency (5.2 ms). The left median sensory and the right median sensory nerves showed prolonged distal peak latency (L3.7, R4.3 ms) and decreased conduction velocity (Wrist-2nd Digit, L38, R33 m/s). All remaining nerves (as indicated in the following tables) were within normal limits. Left vs. Right side comparison data for the median motor nerve indicates abnormal L-R latency difference (1.2 ms). The median sensory nerve indicates abnormal L-R latency difference (0.6 ms). The ulnar sensory nerve indicates abnormal L-R latency difference (0.6 ms). All remaining left vs. right side differences were within normal limits. All examined muscles (as indicated in the following table) showed no evidence of electrical instability. INTERPRETATION    This is an abnormal electrodiagnostic examination. These findings may be consistent with:   1. Moderate median mononeuropathy at the right wrist (carpal tunnel syndrome)   2. Minimal median mononeuropathy at the left wrist (carpal tunnel syndrome)    There is no electrodiagnostic evidence of any cervical radiculopathy, brachial plexopathy, peripheral polyneuropathy, or any other mononeuropathy. CLINICAL INTERPRETATION  Her electrodiagnostic findings of carpal tunnel syndrome bilaterally are consistent with her hand symptoms. HISTORY OF PRESENT ILLNESS  Sukhdev Pena is a 28 y.o. female. Pt presents today for BUE EMG evaluation of bilateral hand numbness and tingling. 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 Giuseppe Carl   .       MEDICATIONS    Current Outpatient Medications   Medication Sig Dispense Refill    NUVARING 0.12-0.015 mg/24 hr vaginal ring INSERT 1 RING VAGINALLY FOR 3 WEEKS THEN REMOVE FOR 1 WEEK AND INSERT NEW RING 1 Device 12        ALLERGIES  Allergies   Allergen Reactions    Zithromax [Azithromycin] Rash    Percocet [Oxycodone-Acetaminophen] Nausea Only          SOCIAL HISTORY    Social History     Socioeconomic History    Marital status:      Spouse name: Not on file    Number of children: Not on file    Years of education: Not on file    Highest education level: Not on file   Tobacco Use    Smoking status: Former Smoker    Smokeless tobacco: Never Used   Substance and Sexual Activity    Alcohol use: No    Drug use: No    Sexual activity: Yes     Partners: Male     Birth control/protection: None, Inserts       FAMILY HISTORY  Family History   Problem Relation Age of Onset    Diabetes Mother     Hypertension Mother     Heart Disease Mother     Stroke Father     Cancer Maternal Aunt         breast    Breast Cancer Maternal Aunt     Diabetes Maternal Grandmother     Breast Cancer Maternal Grandmother          PHYSICAL EXAMINATION  Visit Vitals  /75   Pulse 94   Temp 97.8 °F (36.6 °C) (Temporal)   Resp 14   Ht 5' 5\" (1.651 m)   Wt 272 lb 9.6 oz (123.7 kg)   SpO2 100%   BMI 45.36 kg/m²       Pain Assessment  2/18/2021   Location of Pain Wrist   Location Modifiers Right   Severity of Pain 5   Quality of Pain Throbbing   Quality of Pain Comment tingling, numbness, radiates from the shoulder to the wrist and hand   Duration of Pain Persistent   Frequency of Pain Constant   Aggravating Factors (No Data)   Aggravating Factors Comment staying still, using   Limiting Behavior No   Relieving Factors Nothing   Result of Injury No           Constitutional:  Well developed, well nourished, in no acute distress. Psychiatric: Affect and mood are appropriate. Integumentary: No rashes or abrasions noted on exposed areas. SPINE/MUSCULOSKELETAL EXAM    On brief examination: None.       NCV & EMG Findings:  Nerve Conduction Studies  Anti Sensory Summary Table     Stim Site NR Peak (ms) Norm Peak (ms) O-P Amp (µV) Norm O-P Amp Site1 Site2 Delta-P (ms) Dist (cm) Vin (m/s) Norm Vin (m/s)   Left Median Anti Sensory (2nd Digit)   Wrist    3.7 <3.6 57.0 >10 Wrist 2nd Digit 3.7 14.0 38 >39   Right Median Anti Sensory (2nd Digit)   Wrist    4.3 <3.6 44.2 >10 Wrist 2nd Digit 4.3 14.0 33 >39   Left Radial Anti Sensory (Base 1st Digit)   Wrist    2.0 <3.1 59.0  Wrist Base 1st Digit 2.0 0.0     Right Radial Anti Sensory (Base 1st Digit)   Wrist    1.9 <3.1 58.6  Wrist Base 1st Digit 1.9 0.0     Left Ulnar Anti Sensory (5th Digit)   Wrist    3.6 <3.7 39.5 >15.0 Wrist 5th Digit 3.6 14.0 39 >38   Right Ulnar Anti Sensory (5th Digit)   Wrist    3.0 <3.7 66.0 >15.0 Wrist 5th Digit 3.0 14.0 47 >38     Motor Summary Table     Stim Site NR Onset (ms) Norm Onset (ms) O-P Amp (mV) Norm O-P Amp Site1 Site2 Delta-0 (ms) Dist (cm) Vin (m/s) Norm Vin (m/s)   Left Median Motor (Abd Poll Brev)   Wrist    4.0 <4.2 11.8 >5 Elbow Wrist 3.7 18.0 49 >50   Elbow    7.7  11.3          Right Median Motor (Abd Poll Brev)   Wrist    5.2 <4.2 11.1 >5 Elbow Wrist 4.1 22.5 55 >50   Elbow    9.3  10.0          Left Ulnar Motor (Abd Dig Min)   Wrist    3.0 <4.2 11.5 >3 B Elbow Wrist 3.2 20.0 63 >53   B Elbow    6.2  10.7  A Elbow B Elbow 1.6 10.0 63 >53   A Elbow    7.8  10.6          Right Ulnar Motor (Abd Dig Min)   Wrist    2.8 <4.2 11.8 >3 B Elbow Wrist 3.3 20.0 61 >53   B Elbow    6.1  9.7  A Elbow B Elbow 1.6 10.0 62 >53   A Elbow    7.7  9.4            EMG     Side Muscle Nerve Root Ins Act Fibs Psw Amp Dur Poly Recrt Int Marsa Pikes Comment   Right Biceps Musculocut C5-6 Nml Nml Nml Nml Nml 0 Nml Nml    Right Triceps Radial C6-7-8 Nml Nml Nml Nml Nml 0 Nml Nml    Right PronatorTeres Median C6-7 Nml Nml Nml Nml Nml 0 Nml Nml    Right Abd Poll Brev Median C8-T1 Nml Nml Nml Nml Nml 0 Nml Nml    Right 1stDorInt Ulnar C8-T1 Nml Nml Nml Nml Nml 0 Nml Nml    Left Biceps Musculocut C5-6 Nml Nml Nml Nml Nml 0 Nml Nml    Left Triceps Radial C6-7-8 Nml Nml Nml Nml Nml 0 Nml Nml    Left PronatorTeres Median C6-7 Nml Nml Nml Nml Nml 0 Nml Nml    Left Abd Poll Brev Median C8-T1 Nml Nml Nml Nml Nml 0 Nml Nml    Left 1stDorInt Ulnar C8-T1 Nml Nml Nml Nml Nml 0 Nml Nml        Nerve Conduction Studies  Anti Sensory Left/Right Comparison     Stim Site L Lat (ms) R Lat (ms) L-R Lat (ms) L Amp (µV) R Amp (µV) L-R Amp (%) Site1 Site2 L Vin (m/s) R Vin (m/s) L-R Vin (m/s)   Median Anti Sensory (2nd Digit)   Wrist 3.7 4.3 0.6 57.0 44.2 22.5 Wrist 2nd Digit 38 33 5   Radial Anti Sensory (Base 1st Digit)   Wrist 2.0 1.9 0.1 59.0 58.6 0.7 Wrist Base 1st Digit      Ulnar Anti Sensory (5th Digit)   Wrist 3.6 3.0 0.6 39.5 66.0 40.2 Wrist 5th Digit 39 47 8     Motor Left/Right Comparison     Stim Site L Lat (ms) R Lat (ms) L-R Lat (ms) L Amp (mV) R Amp (mV) L-R Amp (%) Site1 Site2 L Vin (m/s) R Vin (m/s) L-R Vin (m/s)   Median Motor (Abd Poll Brev)   Wrist 4.0 5.2 1.2 11.8 11.1 5.9 Elbow Wrist 49 55 6   Elbow 7.7 9.3 1.6 11.3 10.0 11.5        Ulnar Motor (Abd Dig Min)   Wrist 3.0 2.8 0.2 11.5 11.8 2.5 B Elbow Wrist 63 61 2   B Elbow 6.2 6.1 0.1 10.7 9.7 9.3 A Elbow B Elbow 63 62 1   A Elbow 7.8 7.7 0.1 10.6 9.4 11.3              Waveforms:                                  [facility]  OFFICE PROCEDURE PROGRESS NOTE        Chart reviewed for the following:   Paulette JACOBS, have reviewed the History, Physical and updated the Allergic reactions for [employer] Maria Teresa     TIME OUT performed immediately prior to start of procedure:   Paulette JACOBS, have performed the following reviews on [place] prior to the start of the procedure:            * Patient was identified by name and date of birth   * Agreement on procedure being performed was verified  * Risks and Benefits explained to the patient  * Procedure site verified and marked as necessary  * Patient was positioned for comfort  * Consent was signed and verified     Time: 2:43 PM    Date of procedure: 3/1/2021    Procedure performed by:  Mitul Yeh MD    Provider accompanied by: Scribe. Patient accompanied by: Self.     How tolerated by patient: tolerated the procedure well with no complications    Post Procedural Pain Scale: 0 - No Hurt    Comments: none    Written by Reynaldo Holcomb as dictated by Paulette Leos MD

## 2021-03-04 DIAGNOSIS — G56.21 CUBITAL TUNNEL SYNDROME, RIGHT: ICD-10-CM

## 2021-03-04 DIAGNOSIS — G56.03 BILATERAL CARPAL TUNNEL SYNDROME: ICD-10-CM

## 2021-03-05 ENCOUNTER — TELEPHONE (OUTPATIENT)
Dept: ORTHOPEDIC SURGERY | Age: 36
End: 2021-03-05

## 2021-03-05 NOTE — TELEPHONE ENCOUNTER
Patient called stating she had her EMG on 03/01/21 and her thumb was hurting and stinging afterwards. She was told that was normal and it might be like that for a couple of days, but now she says today it still hurts and stings. She's wondering if this is normal and what she should do for this. Please advise patient back regarding this at [phone].

## 2021-03-05 NOTE — TELEPHONE ENCOUNTER
Spoke to the patient about the thumb pain and let her know that the EMG can  exacerbate the carpel tunnel and that Dr. Ashley El speak to her about treatment at her follow up appointment per Dr. Ashley El.

## 2021-03-11 ENCOUNTER — OFFICE VISIT (OUTPATIENT)
Dept: ORTHOPEDIC SURGERY | Age: 36
End: 2021-03-11
Payer: COMMERCIAL

## 2021-03-11 VITALS
WEIGHT: 264 LBS | OXYGEN SATURATION: 95 % | TEMPERATURE: 97.1 F | SYSTOLIC BLOOD PRESSURE: 114 MMHG | HEART RATE: 77 BPM | DIASTOLIC BLOOD PRESSURE: 72 MMHG | BODY MASS INDEX: 43.99 KG/M2 | HEIGHT: 65 IN

## 2021-03-11 DIAGNOSIS — G56.03 BILATERAL CARPAL TUNNEL SYNDROME: ICD-10-CM

## 2021-03-11 DIAGNOSIS — G56.01 RIGHT CARPAL TUNNEL SYNDROME: Primary | ICD-10-CM

## 2021-03-11 DIAGNOSIS — Z01.818 PREOP EXAMINATION: Primary | ICD-10-CM

## 2021-03-11 PROCEDURE — 99214 OFFICE O/P EST MOD 30 MIN: CPT | Performed by: ORTHOPAEDIC SURGERY

## 2021-03-11 NOTE — PROGRESS NOTES
Claudia Evans is a 28 y.o. female right handed unspecified employment. Worker's Compensation and legal considerations: none filed. Vitals:    03/11/21 0811   BP: 114/72   Pulse: 77   Temp: 97.1 °F (36.2 °C)   TempSrc: Skin   SpO2: 95%   Weight: 264 lb (119.7 kg)   Height: 5' 5\" (1.651 m)   PainSc:   8   PainLoc: Hand   LMP: 02/28/2021           Chief Complaint   Patient presents with    Hand Pain     bilateral    Wrist Pain     bilateral       HPI: Patient returns today for bilateral upper extremity EMG follow-up. She received bilateral carpal tunnel injections that significantly helped. However she is having some pain in the left side after having the nerve study specifically about the thumb wrist and distal forearm. Initial HPI: Patient presents today with complaints right wrist pain numbness and tingling with occasional left-sided numbness and tingling.     Date of onset: Indeterminate    Injury: No    Prior Treatment:  Yes: Comment: Bilateral carpal tunnel injections    Numbness/ Tingling: Yes: Comment: Right much worse than left      ROS: Review of Systems - General ROS: negative  Psychological ROS: negative  ENT ROS: negative  Allergy and Immunology ROS: negative  Hematological and Lymphatic ROS: negative  Respiratory ROS: no cough, shortness of breath, or wheezing  Cardiovascular ROS: no chest pain or dyspnea on exertion  Gastrointestinal ROS: no abdominal pain, change in bowel habits, or black or bloody stools  Musculoskeletal ROS: positive for - pain in arm - right  Neurological ROS: positive for - numbness/tingling  Dermatological ROS: negative    Past Medical History:   Diagnosis Date    Allergic conjunctivitides     allergic conjuctivitis    Anemia NEC     Contact dermatitis and other eczema, due to unspecified cause     HS Bilat axilla    Generalized headaches 4/26/2010    Ill-defined condition     Hydrodenitis    Seasonal allergic rhinitis     Sleep apnea     does not use cpap Past Surgical History:   Procedure Laterality Date    HX GYN      vaginal delivery x 2    HX OTHER SURGICAL  2010    hydrodenitis removed from arm pit and drained    [place] LAP,CHOLECYSTECTOMY N/A 10/06/2016    Dr. Hamilton Points       Current Outpatient Medications   Medication Sig Dispense Refill    NUVARING 0.12-0.015 mg/24 hr vaginal ring INSERT 1 RING VAGINALLY FOR 3 WEEKS THEN REMOVE FOR 1 WEEK AND INSERT NEW RING 1 Device 12       Allergies   Allergen Reactions    Zithromax [Azithromycin] Rash    Percocet [Oxycodone-Acetaminophen] Nausea Only           PE:     Physical Exam  Vitals signs and nursing note reviewed. Constitutional:       General: She is not in acute distress. Appearance: Normal appearance. She is not ill-appearing, toxic-appearing or diaphoretic. HENT:      Head: Normocephalic and atraumatic. Nose: Nose normal.      Mouth/Throat:      Mouth: Mucous membranes are moist.   Eyes:      Extraocular Movements: Extraocular movements intact. Pupils: Pupils are equal, round, and reactive to light. Neck:      Musculoskeletal: Normal range of motion and neck supple. Cardiovascular:      Pulses: Normal pulses. Pulmonary:      Effort: Pulmonary effort is normal. No respiratory distress. Abdominal:      General: Abdomen is flat. There is no distension. Musculoskeletal: Normal range of motion. General: No swelling, tenderness, deformity or signs of injury. Right lower leg: No edema. Left lower leg: No edema. Skin:     General: Skin is warm and dry. Capillary Refill: Capillary refill takes less than 2 seconds. Findings: No bruising or erythema. Neurological:      General: No focal deficit present. Mental Status: She is alert and oriented to person, place, and time. Cranial Nerves: No cranial nerve deficit. Sensory: No sensory deficit.    Psychiatric:         Mood and Affect: Mood normal.         Behavior: Behavior normal. NEUROVASCULAR: There is minimal tenderness about the left side. There may be a positive tinel proximal to the wrist.    Examination L R Examination L R   Carpal Comp. + ++ Pronator Comp. - -   Carpal Tinel + ++ Pronator Tinel - -   Phalen's + + Pronator Stress - -   Cubital Comp. - - Guyon Comp. - -   Cubital Tinel - - Guyon Tinel - -   Elbow Hyperflexion - - Adson's - -   Spurling's - - SC Comp. - -   PCB Median abn - - SC Tinel - -   Radial Tinel - - IC Comp. - -   Digital Tinel - - IC Tinel - -   Radial 2-Pt WNL WNL Ulnar 2-Pt WNL WNL     Radial Pulse: 2+  Capillary Refill: < 2 sec  Eduardo: Not Performed  [employer]: Not Performed      NCV & EMG Findings:  Evaluation of the left median motor nerve showed decreased conduction velocity (Elbow-Wrist, 49 m/s). The right median motor nerve showed prolonged distal onset latency (5.2 ms). The left median sensory and the right median sensory nerves showed prolonged distal peak latency (L3.7, R4.3 ms) and decreased conduction velocity (Wrist-2nd Digit, L38, R33 m/s). All remaining nerves (as indicated in the following tables) were within normal limits. Left vs. Right side comparison data for the median motor nerve indicates abnormal L-R latency difference (1.2 ms). The median sensory nerve indicates abnormal L-R latency difference (0.6 ms). The ulnar sensory nerve indicates abnormal L-R latency difference (0.6 ms). All remaining left vs. right side differences were within normal limits.       All examined muscles (as indicated in the following table) showed no evidence of electrical instability.       INTERPRETATION     This is an abnormal electrodiagnostic examination. These findings may be consistent with:   1. Moderate median mononeuropathy at the right wrist (carpal tunnel syndrome)   2.  Minimal median mononeuropathy at the left wrist (carpal tunnel syndrome)     There is no electrodiagnostic evidence of any cervical radiculopathy, brachial plexopathy, peripheral polyneuropathy, or any other mononeuropathy.     CLINICAL INTERPRETATION  Her electrodiagnostic findings of carpal tunnel syndrome bilaterally are consistent with her hand symptoms      Imaging:     None indicated        ICD-10-CM ICD-9-CM    1. Right carpal tunnel syndrome  G56.01 354.0 SCHEDULE SURGERY   2. Bilateral carpal tunnel syndrome  G56.03 354.0          Plan:     Schedule right carpal tunnel release. This procedure has been fully reviewed with the patient and written informed consent has been obtained. The patient was counseled at length about the risks of emilee Covid-19 during their perioperative period and any recovery window from their procedure. The patient was made aware that emilee Covid-19  may worsen their prognosis for recovering from their procedure and lend to a higher morbidity and/or mortality risk. All material risks, benefits, and reasonable alternatives including postponing the procedure were discussed. The patient does  wish to proceed with the procedure at this time. Follow-up and Dispositions    · Return for 2 weeks postop.           Plan was reviewed with patient, who verbalized agreement and understanding of the plan

## 2021-04-05 ENCOUNTER — HOSPITAL ENCOUNTER (OUTPATIENT)
Dept: LAB | Age: 36
Discharge: HOME | End: 2021-04-05
Payer: COMMERCIAL

## 2021-04-05 LAB — HCG UR QL: NEGATIVE

## 2021-04-05 PROCEDURE — 81025 URINE PREGNANCY TEST: CPT

## 2021-04-05 PROCEDURE — 36415 COLL VENOUS BLD VENIPUNCTURE: CPT

## 2021-04-05 PROCEDURE — U0003 INFECTIOUS AGENT DETECTION BY NUCLEIC ACID (DNA OR RNA); SEVERE ACUTE RESPIRATORY SYNDROME CORONAVIRUS 2 (SARS-COV-2) (CORONAVIRUS DISEASE [COVID-19]), AMPLIFIED PROBE TECHNIQUE, MAKING USE OF HIGH THROUGHPUT TECHNOLOGIES AS DESCRIBED BY CMS-2020-01-R: HCPCS

## 2021-04-05 RX ORDER — IBUPROFEN 800 MG/1
TABLET ORAL
Qty: 90 TAB | Refills: 0 | Status: SHIPPED | OUTPATIENT
Start: 2021-04-05 | End: 2021-05-05

## 2021-04-06 LAB — SARS-COV-2, COV2NT: NOT DETECTED

## 2021-04-08 DIAGNOSIS — G56.01 RIGHT CARPAL TUNNEL SYNDROME: Primary | ICD-10-CM

## 2021-04-08 DIAGNOSIS — Z98.890 S/P CARPAL TUNNEL RELEASE: ICD-10-CM

## 2021-04-08 RX ORDER — HYDROCODONE BITARTRATE AND ACETAMINOPHEN 5; 325 MG/1; MG/1
1 TABLET ORAL
Qty: 12 TAB | Refills: 0 | Status: SHIPPED | OUTPATIENT
Start: 2021-04-08 | End: 2021-04-09 | Stop reason: SINTOL

## 2021-04-09 ENCOUNTER — TELEPHONE (OUTPATIENT)
Dept: ORTHOPEDIC SURGERY | Age: 36
End: 2021-04-09

## 2021-04-09 DIAGNOSIS — G56.01 RIGHT CARPAL TUNNEL SYNDROME: Primary | ICD-10-CM

## 2021-04-09 RX ORDER — TRAMADOL HYDROCHLORIDE 50 MG/1
50 TABLET ORAL
Qty: 28 TAB | Refills: 0 | Status: SHIPPED | OUTPATIENT
Start: 2021-04-09 | End: 2021-04-16

## 2021-04-09 NOTE — TELEPHONE ENCOUNTER
Patient having medication reaction. She stopped norco instructed to take benedryl.     Tramadol sent to pharmacy

## 2021-04-09 NOTE — TELEPHONE ENCOUNTER
Patient's spouse Charli Adames called stating the patient took her prescription HYDROcodone-acetaminophen (Norco) 5-325 mg per tablet and now she has itchy hives all over her back, stomach, and arms. He's wondering what she should do and if she needs to be prescribed a different medication. Please advise patient back regarding this at [phone].

## 2021-04-22 ENCOUNTER — OFFICE VISIT (OUTPATIENT)
Dept: ORTHOPEDIC SURGERY | Age: 36
End: 2021-04-22
Payer: COMMERCIAL

## 2021-04-22 VITALS
HEIGHT: 65 IN | WEIGHT: 274.4 LBS | TEMPERATURE: 97.3 F | BODY MASS INDEX: 45.72 KG/M2 | HEART RATE: 97 BPM | OXYGEN SATURATION: 99 %

## 2021-04-22 DIAGNOSIS — Z98.890 S/P CARPAL TUNNEL RELEASE: ICD-10-CM

## 2021-04-22 DIAGNOSIS — G56.03 BILATERAL CARPAL TUNNEL SYNDROME: ICD-10-CM

## 2021-04-22 DIAGNOSIS — G56.01 RIGHT CARPAL TUNNEL SYNDROME: Primary | ICD-10-CM

## 2021-04-22 PROCEDURE — 99024 POSTOP FOLLOW-UP VISIT: CPT | Performed by: ORTHOPAEDIC SURGERY

## 2021-04-22 RX ORDER — MONTELUKAST SODIUM 10 MG/1
10 TABLET ORAL DAILY
COMMUNITY

## 2021-04-22 NOTE — PROGRESS NOTES
Denita Rivers is a 28 y.o. female right handed unspecified employment. Worker's Compensation and legal considerations: none filed. Vitals:    04/22/21 0930   Pulse: 97   Temp: 97.3 °F (36.3 °C)   TempSrc: Temporal   SpO2: 99%   Weight: 274 lb 6.4 oz (124.5 kg)   Height: 5' 5\" (1.651 m)   PainSc:   5   PainLoc: Wrist           Chief Complaint   Patient presents with    Wrist Pain     right wrist       HPI: Patient presents 2 weeks status post right carpal tunnel release. She reports minimal pain around the hand and reports the numbness to be completely resolved. Preop HPI: Patient returns today for bilateral upper extremity EMG follow-up. She received bilateral carpal tunnel injections that significantly helped. However she is having some pain in the left side after having the nerve study specifically about the thumb wrist and distal forearm. Initial HPI: Patient presents today with complaints right wrist pain numbness and tingling with occasional left-sided numbness and tingling. Date of onset: Indeterminate    Injury: No    Prior Treatment:  Yes: Comment: Right carpal tunnel release.   Bilateral carpal tunnel injections    Numbness/ Tingling: Yes: Comment: Right much worse than left      ROS: Review of Systems - General ROS: negative  Psychological ROS: negative  ENT ROS: negative  Allergy and Immunology ROS: negative  Hematological and Lymphatic ROS: negative  Respiratory ROS: no cough, shortness of breath, or wheezing  Cardiovascular ROS: no chest pain or dyspnea on exertion  Gastrointestinal ROS: no abdominal pain, change in bowel habits, or black or bloody stools  Musculoskeletal ROS: positive for - pain in arm - right  Neurological ROS: positive for - numbness/tingling  Dermatological ROS: negative    Past Medical History:   Diagnosis Date    Allergic conjunctivitides     allergic conjuctivitis    Anemia NEC     Contact dermatitis and other eczema, due to unspecified cause     HS Bilat axilla    Generalized headaches 4/26/2010    Ill-defined condition     Hydrodenitis    Seasonal allergic rhinitis     Sleep apnea     does not use cpap       Past Surgical History:   Procedure Laterality Date    HX GYN      vaginal delivery x 2    HX OTHER SURGICAL  2010    hydrodenitis removed from arm pit and drained    HX WRIST FRACTURE [place]      [place] LAP[place]LE[place]Y N/A 10/06/2016    Dr. Juliette Gray       Current Outpatient Medications   Medication Sig Dispense Refill    montelukast (Singulair) 10 mg tablet Take 10 mg by mouth daily.  ibuprofen (MOTRIN) 800 mg tablet TAKE 1 TABLET BY MOUTH THREE TIMES DAILY WITH MEALS FOR PAIN 90 Tab 0    NUVARING 0.12-0.015 mg/24 hr vaginal ring INSERT 1 RING VAGINALLY FOR 3 WEEKS THEN REMOVE FOR 1 WEEK AND INSERT NEW RING 1 Device 12       Allergies   Allergen Reactions    Zithromax [Azithromycin] Rash    Percocet [Oxycodone-Acetaminophen] Nausea Only    Norco [Hydrocodone-Acetaminophen] Rash           PE:     Physical Exam  Vitals signs and nursing note reviewed. Constitutional:       General: She is not in acute distress. Appearance: Normal appearance. She is not ill-appearing, toxic-appearing or diaphoretic. Neck:      Musculoskeletal: Normal range of motion and neck supple. Cardiovascular:      Pulses: Normal pulses. Pulmonary:      Effort: Pulmonary effort is normal. No respiratory distress. Abdominal:      General: Abdomen is flat. There is no distension. Musculoskeletal: Normal range of motion. General: Tenderness present. No swelling, deformity or signs of injury. Right lower leg: No edema. Left lower leg: No edema. Skin:     General: Skin is warm and dry. Capillary Refill: Capillary refill takes less than 2 seconds. Findings: No bruising or erythema. Neurological:      General: No focal deficit present. Mental Status: She is alert and oriented to person, place, and time. Cranial Nerves:  No cranial nerve deficit. Sensory: No sensory deficit. Psychiatric:         Mood and Affect: Mood normal.         Behavior: Behavior normal.          Right hand: Incision clean dry and intact with no drainage breakdown erythema or any signs of infection. Range of motion full in the hand. Sensation grossly intact distally and cap refill brisk. Mild tenderness to palpation around the incision. NEUROVASCULAR:     Examination L R Examination L R   Carpal Comp. +  Pronator Comp. - -   Carpal Tinel +  Pronator Tinel - -   Phalen's +  Pronator Stress - -   Cubital Comp. - - Guyon Comp. - -   Cubital Tinel - - Guyon Tinel - -   Elbow Hyperflexion - - Adson's - -   Spurling's - - SC Comp. - -   PCB Median abn - - SC Tinel - -   Radial Tinel - - IC Comp. - -   Digital Tinel - - IC Tinel - -   Radial 2-Pt WNL WNL Ulnar 2-Pt WNL WNL     Radial Pulse: 2+  Capillary Refill: < 2 sec  Eduardo: Not Performed  [employer]: Not Performed      NCV & EMG Findings:  Evaluation of the left median motor nerve showed decreased conduction velocity (Elbow-Wrist, 49 m/s). The right median motor nerve showed prolonged distal onset latency (5.2 ms). The left median sensory and the right median sensory nerves showed prolonged distal peak latency (L3.7, R4.3 ms) and decreased conduction velocity (Wrist-2nd Digit, L38, R33 m/s). All remaining nerves (as indicated in the following tables) were within normal limits. Left vs. Right side comparison data for the median motor nerve indicates abnormal L-R latency difference (1.2 ms). The median sensory nerve indicates abnormal L-R latency difference (0.6 ms). The ulnar sensory nerve indicates abnormal L-R latency difference (0.6 ms).   All remaining left vs. right side differences were within normal limits.       All examined muscles (as indicated in the following table) showed no evidence of electrical instability.       INTERPRETATION     This is an abnormal electrodiagnostic examination. These findings may be consistent with:   1. Moderate median mononeuropathy at the right wrist (carpal tunnel syndrome)   2. Minimal median mononeuropathy at the left wrist (carpal tunnel syndrome)     There is no electrodiagnostic evidence of any cervical radiculopathy, brachial plexopathy, peripheral polyneuropathy, or any other mononeuropathy.     CLINICAL INTERPRETATION  Her electrodiagnostic findings of carpal tunnel syndrome bilaterally are consistent with her hand symptoms      Imaging:     None indicated        ICD-10-CM ICD-9-CM    1. Right carpal tunnel syndrome  G56.01 354.0    2. S/P carpal tunnel release  Z98.890 V45.89    3. Bilateral carpal tunnel syndrome  G56.03 354.0          Plan:     Discussed range of motion exercises, edema control, and other modalities for scar care. Follow-up and Dispositions    · Return if symptoms worsen or fail to improve.           Plan was reviewed with patient, who verbalized agreement and understanding of the plan

## 2021-05-05 RX ORDER — IBUPROFEN 800 MG/1
TABLET ORAL
Qty: 90 TAB | Refills: 0 | Status: SHIPPED | OUTPATIENT
Start: 2021-05-05 | End: 2021-12-13

## 2021-09-08 ENCOUNTER — HOSPITAL ENCOUNTER (OUTPATIENT)
Dept: LAB | Age: 36
Discharge: HOME | End: 2021-09-08
Payer: COMMERCIAL

## 2021-09-08 LAB
25(OH)D3 SERPL-MCNC: 24.5 NG/ML (ref 30–100)
ALBUMIN SERPL-MCNC: 3.4 G/DL (ref 3.4–5)
ALBUMIN/GLOB SERPL: 0.8 {RATIO} (ref 0.8–1.7)
ALP SERPL-CCNC: 78 U/L (ref 45–117)
ALT SERPL-CCNC: 18 U/L (ref 13–56)
ANION GAP SERPL CALC-SCNC: 8 MMOL/L (ref 3–18)
AST SERPL-CCNC: 9 U/L (ref 10–38)
BILIRUB SERPL-MCNC: 0.5 MG/DL (ref 0.2–1)
BUN SERPL-MCNC: 12 MG/DL (ref 7–18)
BUN/CREAT SERPL: 15 (ref 12–20)
CALCIUM SERPL-MCNC: 8.8 MG/DL (ref 8.5–10.1)
CHLORIDE SERPL-SCNC: 109 MMOL/L (ref 100–111)
CO2 SERPL-SCNC: 23 MMOL/L (ref 21–32)
CREAT SERPL-MCNC: 0.81 MG/DL (ref 0.6–1.3)
ERYTHROCYTE [DISTWIDTH] IN BLOOD BY AUTOMATED COUNT: 14.8 % (ref 11.6–14.5)
GLOBULIN SER CALC-MCNC: 4.2 G/DL (ref 2–4)
GLUCOSE SERPL-MCNC: 91 MG/DL (ref 74–99)
HBA1C MFR BLD: 6 % (ref 4.2–5.6)
HCT VFR BLD AUTO: 37.9 % (ref 35–45)
HGB BLD-MCNC: 12.3 G/DL (ref 12–16)
IRON SATN MFR SERPL: 16 % (ref 20–50)
IRON SERPL-MCNC: 43 UG/DL (ref 50–175)
MCH RBC QN AUTO: 26.2 PG (ref 24–34)
MCHC RBC AUTO-ENTMCNC: 32.5 G/DL (ref 31–37)
MCV RBC AUTO: 80.6 FL (ref 78–100)
PLATELET # BLD AUTO: 260 K/UL (ref 135–420)
PMV BLD AUTO: 11.1 FL (ref 9.2–11.8)
POTASSIUM SERPL-SCNC: 4.4 MMOL/L (ref 3.5–5.5)
PROT SERPL-MCNC: 7.6 G/DL (ref 6.4–8.2)
RBC # BLD AUTO: 4.7 M/UL (ref 4.2–5.3)
SODIUM SERPL-SCNC: 140 MMOL/L (ref 136–145)
TIBC SERPL-MCNC: 272 UG/DL (ref 250–450)
TSH SERPL DL<=0.05 MIU/L-ACNC: 1.17 UIU/ML (ref 0.36–3.74)
WBC # BLD AUTO: 7.6 K/UL (ref 4.6–13.2)

## 2021-09-08 PROCEDURE — 84443 ASSAY THYROID STIM HORMONE: CPT

## 2021-09-08 PROCEDURE — 82306 VITAMIN D 25 HYDROXY: CPT

## 2021-09-08 PROCEDURE — 80053 COMPREHEN METABOLIC PANEL: CPT

## 2021-09-08 PROCEDURE — 85027 COMPLETE CBC AUTOMATED: CPT

## 2021-09-08 PROCEDURE — 82985 ASSAY OF GLYCATED PROTEIN: CPT

## 2021-09-08 PROCEDURE — 83036 HEMOGLOBIN GLYCOSYLATED A1C: CPT

## 2021-09-08 PROCEDURE — 83540 ASSAY OF IRON: CPT

## 2021-09-08 PROCEDURE — 36415 COLL VENOUS BLD VENIPUNCTURE: CPT

## 2021-09-09 LAB — FRUCTOSAMINE SERPL-SCNC: 226 UMOL/L (ref 0–285)

## 2021-12-13 RX ORDER — IBUPROFEN 800 MG/1
TABLET ORAL
Qty: 90 TABLET | Refills: 0 | Status: SHIPPED | OUTPATIENT
Start: 2021-12-13 | End: 2022-09-23

## 2022-03-19 PROBLEM — E66.01 OBESITY, MORBID (HCC): Status: ACTIVE | Noted: 2018-01-03

## 2022-03-23 ENCOUNTER — HOSPITAL ENCOUNTER (OUTPATIENT)
Dept: LAB | Age: 37
Discharge: HOME | End: 2022-03-23
Payer: COMMERCIAL

## 2022-03-23 LAB
25(OH)D3 SERPL-MCNC: 62.5 NG/ML (ref 30–100)
ALBUMIN SERPL-MCNC: 3.4 G/DL (ref 3.4–5)
ALBUMIN/GLOB SERPL: 0.8 {RATIO} (ref 0.8–1.7)
ALP SERPL-CCNC: 96 U/L (ref 45–117)
ALT SERPL-CCNC: 27 U/L (ref 13–56)
ANION GAP SERPL CALC-SCNC: 8 MMOL/L (ref 3–18)
AST SERPL-CCNC: 12 U/L (ref 10–38)
BILIRUB SERPL-MCNC: 0.4 MG/DL (ref 0.2–1)
BUN SERPL-MCNC: 17 MG/DL (ref 7–18)
BUN/CREAT SERPL: 21 (ref 12–20)
CALCIUM SERPL-MCNC: 9 MG/DL (ref 8.5–10.1)
CHLORIDE SERPL-SCNC: 109 MMOL/L (ref 100–111)
CHOLEST SERPL-MCNC: 190 MG/DL
CO2 SERPL-SCNC: 21 MMOL/L (ref 21–32)
CREAT SERPL-MCNC: 0.82 MG/DL (ref 0.6–1.3)
ERYTHROCYTE [DISTWIDTH] IN BLOOD BY AUTOMATED COUNT: 13.8 % (ref 11.6–14.5)
GLOBULIN SER CALC-MCNC: 4.2 G/DL (ref 2–4)
GLUCOSE SERPL-MCNC: 133 MG/DL (ref 74–99)
HBA1C MFR BLD: 6 % (ref 4.2–5.6)
HCT VFR BLD AUTO: 37.6 % (ref 35–45)
HDLC SERPL-MCNC: 55 MG/DL (ref 40–60)
HDLC SERPL: 3.5 {RATIO} (ref 0–5)
HGB BLD-MCNC: 11.8 G/DL (ref 12–16)
IRON SATN MFR SERPL: 13 % (ref 20–50)
IRON SERPL-MCNC: 41 UG/DL (ref 50–175)
LDLC SERPL CALC-MCNC: 117 MG/DL (ref 0–100)
LIPID PROFILE,FLP: ABNORMAL
MCH RBC QN AUTO: 26.6 PG (ref 24–34)
MCHC RBC AUTO-ENTMCNC: 31.4 G/DL (ref 31–37)
MCV RBC AUTO: 84.9 FL (ref 78–100)
NRBC # BLD: 0 K/UL (ref 0–0.01)
NRBC BLD-RTO: 0 PER 100 WBC
PLATELET # BLD AUTO: 240 K/UL (ref 135–420)
PMV BLD AUTO: 11.5 FL (ref 9.2–11.8)
POTASSIUM SERPL-SCNC: 4.2 MMOL/L (ref 3.5–5.5)
PROT SERPL-MCNC: 7.6 G/DL (ref 6.4–8.2)
RBC # BLD AUTO: 4.43 M/UL (ref 4.2–5.3)
SODIUM SERPL-SCNC: 138 MMOL/L (ref 136–145)
TIBC SERPL-MCNC: 307 UG/DL (ref 250–450)
TRIGL SERPL-MCNC: 90 MG/DL (ref ?–150)
TSH SERPL DL<=0.05 MIU/L-ACNC: 0.85 UIU/ML (ref 0.36–3.74)
URATE SERPL-MCNC: 3.3 MG/DL (ref 2.6–7.2)
VIT B12 SERPL-MCNC: 622 PG/ML (ref 211–911)
VLDLC SERPL CALC-MCNC: 18 MG/DL
WBC # BLD AUTO: 8.4 K/UL (ref 4.6–13.2)

## 2022-03-23 PROCEDURE — 83540 ASSAY OF IRON: CPT

## 2022-03-23 PROCEDURE — 84550 ASSAY OF BLOOD/URIC ACID: CPT

## 2022-03-23 PROCEDURE — 82607 VITAMIN B-12: CPT

## 2022-03-23 PROCEDURE — 80061 LIPID PANEL: CPT

## 2022-03-23 PROCEDURE — 80053 COMPREHEN METABOLIC PANEL: CPT

## 2022-03-23 PROCEDURE — 36415 COLL VENOUS BLD VENIPUNCTURE: CPT

## 2022-03-23 PROCEDURE — 82306 VITAMIN D 25 HYDROXY: CPT

## 2022-03-23 PROCEDURE — 85027 COMPLETE CBC AUTOMATED: CPT

## 2022-03-23 PROCEDURE — 83036 HEMOGLOBIN GLYCOSYLATED A1C: CPT

## 2022-03-23 PROCEDURE — 84443 ASSAY THYROID STIM HORMONE: CPT

## 2022-08-10 ENCOUNTER — OFFICE VISIT (OUTPATIENT)
Dept: ORTHOPEDIC SURGERY | Age: 37
End: 2022-08-10
Payer: COMMERCIAL

## 2022-08-10 VITALS
HEART RATE: 99 BPM | TEMPERATURE: 97.1 F | OXYGEN SATURATION: 98 % | HEIGHT: 65 IN | WEIGHT: 271.4 LBS | BODY MASS INDEX: 45.22 KG/M2

## 2022-08-10 DIAGNOSIS — M65.4 DE QUERVAIN'S TENOSYNOVITIS, RIGHT: Primary | ICD-10-CM

## 2022-08-10 PROCEDURE — 20550 NJX 1 TENDON SHEATH/LIGAMENT: CPT | Performed by: ORTHOPAEDIC SURGERY

## 2022-08-10 PROCEDURE — 99214 OFFICE O/P EST MOD 30 MIN: CPT | Performed by: ORTHOPAEDIC SURGERY

## 2022-08-10 NOTE — PROGRESS NOTES
Von Cunha is a 39 y.o. female right handed unspecified employment. Worker's Compensation and legal considerations: none filed. Vitals:    08/10/22 1533   Pulse: 99   Temp: 97.1 °F (36.2 °C)   TempSrc: Temporal   SpO2: 98%   Weight: 271 lb 6.4 oz (123.1 kg)   Height: 5' 5\" (1.651 m)   PainSc:   8   PainLoc: Hand           Chief Complaint   Patient presents with    Hand Pain     Rt hand        HPI: Patient presents today with pain in her wrist on the thumb side of her wrist.    2 weeks HPI: Patient presents 2 weeks status post right carpal tunnel release. She reports minimal pain around the hand and reports the numbness to be completely resolved. Preop HPI: Patient returns today for bilateral upper extremity EMG follow-up. She received bilateral carpal tunnel injections that significantly helped. However she is having some pain in the left side after having the nerve study specifically about the thumb wrist and distal forearm. Initial HPI: Patient presents today with complaints right wrist pain numbness and tingling with occasional left-sided numbness and tingling. Date of onset: Indeterminate    Injury: No    Prior Treatment:  Yes: Comment: Right carpal tunnel release.   Bilateral carpal tunnel injections    Numbness/ Tingling: Yes: Comment: Right much worse than left      ROS: Review of Systems - General ROS: negative  Psychological ROS: negative  ENT ROS: negative  Allergy and Immunology ROS: negative  Hematological and Lymphatic ROS: negative  Respiratory ROS: no cough, shortness of breath, or wheezing  Cardiovascular ROS: no chest pain or dyspnea on exertion  Gastrointestinal ROS: no abdominal pain, change in bowel habits, or black or bloody stools  Musculoskeletal ROS: positive for - pain in arm - right  Neurological ROS: positive for - numbness/tingling  Dermatological ROS: negative    Past Medical History:   Diagnosis Date    Allergic conjunctivitides     allergic conjuctivitis    Anemia NEC     Contact dermatitis and other eczema, due to unspecified cause     HS Bilat axilla    Generalized headaches 4/26/2010    Ill-defined condition     Hydrodenitis    Seasonal allergic rhinitis     Sleep apnea     does not use cpap       Past Surgical History:   Procedure Laterality Date    HX GYN      vaginal delivery x 2    HX OTHER SURGICAL  2010    hydrodenitis removed from arm pit and drained    HX WRIST FRACTURE TX      [place] LAP[place]CHOLECYSTECTOMY N/A 10/06/2016    Dr. Alberto [facility]       Current Outpatient Medications   Medication Sig Dispense Refill    ibuprofen (MOTRIN) 800 mg tablet TAKE 1 TABLET BY MOUTH THREE TIMES DAILY WITH MEALS FOR PAIN 90 Tablet 0    montelukast (Singulair) 10 mg tablet Take 10 mg by mouth daily. NUVARING 0.12-0.015 mg/24 hr vaginal ring INSERT 1 RING VAGINALLY FOR 3 WEEKS THEN REMOVE FOR 1 WEEK AND INSERT NEW RING 1 Device 12     Current Facility-Administered Medications   Medication Dose Route Frequency Provider Last Rate Last Admin    triamcinolone acetonide (KENALOG) 10 mg/mL injection 5 mg  5 mg Other ONCE German Campbell, DO           Allergies   Allergen Reactions    Zithromax [Azithromycin] Rash    Percocet [Oxycodone-Acetaminophen] Nausea Only    Norco [Hydrocodone-Acetaminophen] Rash           PE:     Physical Exam  Vitals and nursing note reviewed. Constitutional:       General: She is not in acute distress. Appearance: Normal appearance. She is not ill-appearing. Cardiovascular:      Pulses: Normal pulses. Pulmonary:      Effort: Pulmonary effort is normal. No respiratory distress. Musculoskeletal:         General: Tenderness present. No swelling, deformity or signs of injury. Normal range of motion. Cervical back: Normal range of motion and neck supple. Right lower leg: No edema. Left lower leg: No edema. Skin:     General: Skin is warm and dry. Capillary Refill: Capillary refill takes less than 2 seconds.       Findings: No bruising or erythema. Neurological:      General: No focal deficit present. Mental Status: She is alert and oriented to person, place, and time. Cranial Nerves: No cranial nerve deficit. Sensory: No sensory deficit. Psychiatric:         Mood and Affect: Mood normal.         Behavior: Behavior normal.        Wrist:    Tenderness L R Test L R   1st Ext Comp - + Finkelstein's - +   Snuff Box - - Perez - -   2nd Ext Comp - - S-L Shear - -   S-L Joint - - L-T Shear - -   L-T Joint - - DRUJ Sup - -   6th Ext Comp - - DRUJ Pro - -   Ulnar Snuff - - DRUJ Grind - -   Fovea - - TFCC - -   STT Joint - - Mid-Carp Inst - -   FCR - - P-T Grind - -   Intersection - - ECU Sublux. - -      Dorsal Ganglion: -   Volar Ganglion: -      ROM: Full        NCV & EMG Findings:  Evaluation of the left median motor nerve showed decreased conduction velocity (Elbow-Wrist, 49 m/s). The right median motor nerve showed prolonged distal onset latency (5.2 ms). The left median sensory and the right median sensory nerves showed prolonged distal peak latency (L3.7, R4.3 ms) and decreased conduction velocity (Wrist-2nd Digit, L38, R33 m/s). All remaining nerves (as indicated in the following tables) were within normal limits. Left vs. Right side comparison data for the median motor nerve indicates abnormal L-R latency difference (1.2 ms). The median sensory nerve indicates abnormal L-R latency difference (0.6 ms). The ulnar sensory nerve indicates abnormal L-R latency difference (0.6 ms). All remaining left vs. right side differences were within normal limits. All examined muscles (as indicated in the following table) showed no evidence of electrical instability. INTERPRETATION     This is an abnormal electrodiagnostic examination. These findings may be consistent with:   1. Moderate median mononeuropathy at the right wrist (carpal tunnel syndrome)   2.  Minimal median mononeuropathy at the left wrist (carpal tunnel syndrome)     There is no electrodiagnostic evidence of any cervical radiculopathy, brachial plexopathy, peripheral polyneuropathy, or any other mononeuropathy. CLINICAL INTERPRETATION  Her electrodiagnostic findings of carpal tunnel syndrome bilaterally are consistent with her hand symptoms      Imaging:     None indicated        ICD-10-CM ICD-9-CM    1. De Quervain's tenosynovitis, right  M65.4 727.04 triamcinolone acetonide (KENALOG) 10 mg/mL injection 5 mg      INJECT TENDON SHEATH/LIGAMENT      AMB SUPPLY ORDER            Plan:     Right first dorsal compartment injection and brace. Follow-up and Dispositions    Return in about 6 weeks (around 9/21/2022) for Reevaluation and exercises. Plan was reviewed with patient, who verbalized agreement and understanding of the plan    Laura [address] NOTE        Chart reviewed for the following:   German JACOBS DO, have reviewed the History, Physical and updated the Allergic reactions for [id number] performed immediately prior to start of procedure:   Letty JACOBS DO, have performed the following reviews on Scarlet Capes prior to the start of the procedure:            * Patient was identified by name and date of birth   * Agreement on procedure being performed was verified  * Risks and Benefits explained to the patient  * Procedure site verified and marked as necessary  * Patient was positioned for comfort  * Consent was signed and verified     Time: 15:54      Date of procedure: 8/10/2022    Procedure performed by: Letty Nguyen DO    Provider assisted by: Waldemar Sorenson MA    Patient assisted by: self    How tolerated by patient: tolerated the procedure well with no complications    Post Procedural Pain Scale: 0 - No Hurt    Comments: none    Procedure:  After consent was obtained, using sterile technique the right wrist was prepped.  Local anesthetic used: 1% lidocaine. Kenalog 5 mg and was then injected and the needle withdrawn. The procedure was well tolerated. The patient is asked to continue to rest the area for a few more days before resuming regular activities. It may be more painful for the first 1-2 days. Watch for fever, or increased swelling or persistent pain in the joint. Call or return to clinic prn if such symptoms occur or there is failure to improve as anticipated.

## 2022-09-23 ENCOUNTER — OFFICE VISIT (OUTPATIENT)
Dept: ORTHOPEDIC SURGERY | Age: 37
End: 2022-09-23
Payer: COMMERCIAL

## 2022-09-23 VITALS — BODY MASS INDEX: 44.65 KG/M2 | TEMPERATURE: 96.8 F | WEIGHT: 268 LBS | HEIGHT: 65 IN

## 2022-09-23 DIAGNOSIS — M65.4 DE QUERVAIN'S TENOSYNOVITIS, RIGHT: Primary | ICD-10-CM

## 2022-09-23 PROCEDURE — 99214 OFFICE O/P EST MOD 30 MIN: CPT | Performed by: ORTHOPAEDIC SURGERY

## 2022-09-23 NOTE — PROGRESS NOTES
Prince Castaneda is a 40 y.o. female right handed unspecified employment. Worker's Compensation and legal considerations: none filed. Vitals:    09/23/22 0856   Temp: 96.8 °F (36 °C)   TempSrc: Temporal   Weight: 268 lb (121.6 kg)   Height: 5' 5\" (1.651 m)   PainSc:   5   PainLoc: Wrist           Chief Complaint   Patient presents with    Wrist Pain     RIGHT WRIST PAIN       HPI: Patient presents today for follow-up after right first dorsal compartment injection. Initially she had her symptoms relieved and continue to wear the brace however over the past couple weeks her pain has come back significantly. 8/10/2022 HPI: Patient presents today with pain in her wrist on the thumb side of her wrist.    2 weeks HPI: Patient presents 2 weeks status post right carpal tunnel release. She reports minimal pain around the hand and reports the numbness to be completely resolved. Preop HPI: Patient returns today for bilateral upper extremity EMG follow-up. She received bilateral carpal tunnel injections that significantly helped. However she is having some pain in the left side after having the nerve study specifically about the thumb wrist and distal forearm. Initial HPI: Patient presents today with complaints right wrist pain numbness and tingling with occasional left-sided numbness and tingling. Date of onset: Indeterminate    Injury: No    Prior Treatment:  Yes: Comment: Right carpal tunnel release. Right first dorsal compartment injection.   Bilateral carpal tunnel injections    Numbness/ Tingling: Yes: Comment: Right much worse than left      ROS: Review of Systems - General ROS: negative  Psychological ROS: negative  ENT ROS: negative  Allergy and Immunology ROS: negative  Hematological and Lymphatic ROS: negative  Respiratory ROS: no cough, shortness of breath, or wheezing  Cardiovascular ROS: no chest pain or dyspnea on exertion  Gastrointestinal ROS: no abdominal pain, change in bowel habits, or black or bloody stools  Musculoskeletal ROS: positive for - pain in arm - right  Neurological ROS: positive for - numbness/tingling  Dermatological ROS: negative    Past Medical History:   Diagnosis Date    Allergic conjunctivitides     allergic conjuctivitis    Anemia NEC     Contact dermatitis and other eczema, due to unspecified cause     HS Bilat axilla    Generalized headaches 4/26/2010    Ill-defined condition     Hydrodenitis    Seasonal allergic rhinitis     Sleep apnea     does not use cpap       Past Surgical History:   Procedure Laterality Date    HX GYN      vaginal delivery x 2    HX OTHER SURGICAL  2010    hydrodenitis removed from arm pit and drained    HX WRIST FRACTURE TX      [place] LAP,CHOLECYSTECTOMY N/A 10/06/2016    Dr. Jona Luis       Current Outpatient Medications   Medication Sig Dispense Refill    semaglutide (OZEMPIC SC) by SubCUTAneous route. montelukast (SINGULAIR) 10 mg tablet Take 10 mg by mouth daily. NUVARING 0.12-0.015 mg/24 hr vaginal ring INSERT 1 RING VAGINALLY FOR 3 WEEKS THEN REMOVE FOR 1 WEEK AND INSERT NEW RING 1 Device 12    ibuprofen (MOTRIN) 800 mg tablet TAKE 1 TABLET BY MOUTH THREE TIMES DAILY WITH MEALS FOR PAIN (Patient not taking: Reported on 9/23/2022) 90 Tablet 0       Allergies   Allergen Reactions    Zithromax [Azithromycin] Rash    Percocet [Oxycodone-Acetaminophen] Nausea Only    Norco [Hydrocodone-Acetaminophen] Rash           PE:     Physical Exam  Vitals and nursing note reviewed. Constitutional:       General: She is not in acute distress. Appearance: Normal appearance. She is not ill-appearing. Cardiovascular:      Pulses: Normal pulses. Pulmonary:      Effort: Pulmonary effort is normal. No respiratory distress. Musculoskeletal:         General: Tenderness present. No swelling, deformity or signs of injury. Normal range of motion. Cervical back: Normal range of motion and neck supple. Right lower leg: No edema.       Left lower leg: No edema. Skin:     General: Skin is warm and dry. Capillary Refill: Capillary refill takes less than 2 seconds. Findings: No bruising or erythema. Neurological:      General: No focal deficit present. Mental Status: She is alert and oriented to person, place, and time. Cranial Nerves: No cranial nerve deficit. Sensory: No sensory deficit. Psychiatric:         Mood and Affect: Mood normal.         Behavior: Behavior normal.        Wrist:    Tenderness L R Test L R   1st Ext Comp - + Finkelstein's - +   Snuff Box - - Perez - -   2nd Ext Comp - - S-L Shear - -   S-L Joint - - L-T Shear - -   L-T Joint - - DRUJ Sup - -   6th Ext Comp - - DRUJ Pro - -   Ulnar Snuff - - DRUJ Grind - -   Fovea - - TFCC - -   STT Joint - - Mid-Carp Inst - -   FCR - - P-T Grind - -   Intersection - - ECU Sublux. - -      Dorsal Ganglion: -   Volar Ganglion: -      ROM: Full        NCV & EMG Findings:  Evaluation of the left median motor nerve showed decreased conduction velocity (Elbow-Wrist, 49 m/s). The right median motor nerve showed prolonged distal onset latency (5.2 ms). The left median sensory and the right median sensory nerves showed prolonged distal peak latency (L3.7, R4.3 ms) and decreased conduction velocity (Wrist-2nd Digit, L38, R33 m/s). All remaining nerves (as indicated in the following tables) were within normal limits. Left vs. Right side comparison data for the median motor nerve indicates abnormal L-R latency difference (1.2 ms). The median sensory nerve indicates abnormal L-R latency difference (0.6 ms). The ulnar sensory nerve indicates abnormal L-R latency difference (0.6 ms). All remaining left vs. right side differences were within normal limits. All examined muscles (as indicated in the following table) showed no evidence of electrical instability. INTERPRETATION     This is an abnormal electrodiagnostic examination.  These findings may be consistent with: 1. Moderate median mononeuropathy at the right wrist (carpal tunnel syndrome)   2. Minimal median mononeuropathy at the left wrist (carpal tunnel syndrome)     There is no electrodiagnostic evidence of any cervical radiculopathy, brachial plexopathy, peripheral polyneuropathy, or any other mononeuropathy. CLINICAL INTERPRETATION  Her electrodiagnostic findings of carpal tunnel syndrome bilaterally are consistent with her hand symptoms      Imaging:     None indicated        ICD-10-CM ICD-9-CM    1. De Quervain's tenosynovitis, right  M65.4 727.04 SCHEDULE SURGERY            Plan:     Schedule right first dorsal compartment release. We discussed operative versus nonoperative treatment but patient prefer to move forward with surgery at this point. Follow-up and Dispositions    Return for H&P.           Plan was reviewed with patient, who verbalized agreement and understanding of the plan

## 2022-09-25 LAB — SARS-COV-2, NAA: NOT DETECTED

## 2022-11-02 ENCOUNTER — OFFICE VISIT (OUTPATIENT)
Dept: ORTHOPEDIC SURGERY | Age: 37
End: 2022-11-02

## 2022-11-02 VITALS
WEIGHT: 270 LBS | HEIGHT: 65 IN | TEMPERATURE: 97.5 F | BODY MASS INDEX: 44.98 KG/M2 | DIASTOLIC BLOOD PRESSURE: 81 MMHG | SYSTOLIC BLOOD PRESSURE: 117 MMHG

## 2022-11-02 DIAGNOSIS — M65.4 DE QUERVAIN'S TENOSYNOVITIS, RIGHT: Primary | ICD-10-CM

## 2022-11-02 NOTE — LETTER
11/2/2022    Patient: Riki Cranker   YOB: 1985   Date of Visit: 11/2/2022     Maude Patel NP  [address]  [address]  [facility]    Dear Maude Patel NP,      Thank you for referring Ms. Jane Joseph to [address] [place] [address] for evaluation. My notes for this consultation are attached. If you have questions, please do not hesitate to call me. I look forward to following your patient along with you.       Sincerely,    Janny Yee, DO

## 2022-11-02 NOTE — PROGRESS NOTES
Brooklynn Grace is a 40 y.o. female right handed unspecified employment. Worker's Compensation and legal considerations: none filed. Vitals:    11/02/22 1340   BP: 117/81   Temp: 97.5 °F (36.4 °C)   TempSrc: Temporal   Weight: 270 lb (122.5 kg)   Height: 5' 5\" (1.651 m)   PainSc:   0 - No pain   PainLoc: Hand           Chief Complaint   Patient presents with    Physical     Rt Dorsal dequervain       HPI: Patient returns today for right wrist preop visit as she is scheduled for a first dorsal compartment release. 9/23/2022 HPI: Patient presents today for follow-up after right first dorsal compartment injection. Initially she had her symptoms relieved and continue to wear the brace however over the past couple weeks her pain has come back significantly. 8/10/2022 HPI: Patient presents today with pain in her wrist on the thumb side of her wrist.    2 weeks HPI: Patient presents 2 weeks status post right carpal tunnel release. She reports minimal pain around the hand and reports the numbness to be completely resolved. Preop HPI: Patient returns today for bilateral upper extremity EMG follow-up. She received bilateral carpal tunnel injections that significantly helped. However she is having some pain in the left side after having the nerve study specifically about the thumb wrist and distal forearm. Initial HPI: Patient presents today with complaints right wrist pain numbness and tingling with occasional left-sided numbness and tingling. Date of onset: Indeterminate    Injury: No    Prior Treatment:  Yes: Comment: Right carpal tunnel release. Right first dorsal compartment injection.   Bilateral carpal tunnel injections    Numbness/ Tingling: Yes: Comment: Right much worse than left      ROS: Review of Systems - General ROS: negative  Psychological ROS: negative  ENT ROS: negative  Allergy and Immunology ROS: negative  Hematological and Lymphatic ROS: negative  Respiratory ROS: no cough, shortness of breath, or wheezing  Cardiovascular ROS: no chest pain or dyspnea on exertion  Gastrointestinal ROS: no abdominal pain, change in bowel habits, or black or bloody stools  Musculoskeletal ROS: positive for - pain in arm - right  Neurological ROS: positive for - numbness/tingling  Dermatological ROS: negative    Past Medical History:   Diagnosis Date    Allergic conjunctivitides     allergic conjuctivitis    Anemia NEC     Contact dermatitis and other eczema, due to unspecified cause     HS Bilat axilla    Generalized headaches 4/26/2010    Ill-defined condition     Hydrodenitis    Seasonal allergic rhinitis     Sleep apnea     does not use cpap       Past Surgical History:   Procedure Laterality Date    HX GYN      vaginal delivery x 2    HX OTHER SURGICAL  2010    hydrodenitis removed from arm pit and drained    HX WRIST FRACTURE TX      [place] LAP,CHOLECYSTECTOMY N/A 10/06/2016    Dr. Girma Clancy       Current Outpatient Medications   Medication Sig Dispense Refill    semaglutide (OZEMPIC SC) by SubCUTAneous route. montelukast (SINGULAIR) 10 mg tablet Take 10 mg by mouth daily. NUVARING 0.12-0.015 mg/24 hr vaginal ring INSERT 1 RING VAGINALLY FOR 3 WEEKS THEN REMOVE FOR 1 WEEK AND INSERT NEW RING 1 Device 12       Allergies   Allergen Reactions    Zithromax [Azithromycin] Rash    Percocet [Oxycodone-Acetaminophen] Nausea Only    Norco [Hydrocodone-Acetaminophen] Rash           PE:     Physical Exam  Vitals and nursing note reviewed. Constitutional:       General: She is not in acute distress. Appearance: Normal appearance. She is not ill-appearing, toxic-appearing or diaphoretic. HENT:      Head: Normocephalic and atraumatic. Nose: Nose normal.      Mouth/Throat:      Mouth: Mucous membranes are moist.   Eyes:      Extraocular Movements: Extraocular movements intact. Pupils: Pupils are equal, round, and reactive to light. Cardiovascular:      Pulses: Normal pulses.    Pulmonary: Effort: Pulmonary effort is normal. No respiratory distress. Abdominal:      General: Abdomen is flat. There is no distension. Musculoskeletal:         General: Tenderness present. No swelling, deformity or signs of injury. Normal range of motion. Cervical back: Normal range of motion and neck supple. Right lower leg: No edema. Left lower leg: No edema. Skin:     General: Skin is warm and dry. Capillary Refill: Capillary refill takes less than 2 seconds. Findings: No bruising or erythema. Neurological:      General: No focal deficit present. Mental Status: She is alert and oriented to person, place, and time. Cranial Nerves: No cranial nerve deficit. Sensory: No sensory deficit. Psychiatric:         Mood and Affect: Mood normal.         Behavior: Behavior normal.        Wrist:    Tenderness L R Test L R   1st Ext Comp - + Finkelstein's - +   Snuff Box - - Perez - -   2nd Ext Comp - - S-L Shear - -   S-L Joint - - L-T Shear - -   L-T Joint - - DRUJ Sup - -   6th Ext Comp - - DRUJ Pro - -   Ulnar Snuff - - DRUJ Grind - -   Fovea - - TFCC - -   STT Joint - - Mid-Carp Inst - -   FCR - - P-T Grind - -   Intersection - - ECU Sublux. - -      Dorsal Ganglion: -   Volar Ganglion: -      ROM: Full        NCV & EMG Findings:  Evaluation of the left median motor nerve showed decreased conduction velocity (Elbow-Wrist, 49 m/s). The right median motor nerve showed prolonged distal onset latency (5.2 ms). The left median sensory and the right median sensory nerves showed prolonged distal peak latency (L3.7, R4.3 ms) and decreased conduction velocity (Wrist-2nd Digit, L38, R33 m/s). All remaining nerves (as indicated in the following tables) were within normal limits. Left vs. Right side comparison data for the median motor nerve indicates abnormal L-R latency difference (1.2 ms). The median sensory nerve indicates abnormal L-R latency difference (0.6 ms).   The ulnar sensory nerve indicates abnormal L-R latency difference (0.6 ms). All remaining left vs. right side differences were within normal limits. All examined muscles (as indicated in the following table) showed no evidence of electrical instability. INTERPRETATION     This is an abnormal electrodiagnostic examination. These findings may be consistent with:   1. Moderate median mononeuropathy at the right wrist (carpal tunnel syndrome)   2. Minimal median mononeuropathy at the left wrist (carpal tunnel syndrome)     There is no electrodiagnostic evidence of any cervical radiculopathy, brachial plexopathy, peripheral polyneuropathy, or any other mononeuropathy. CLINICAL INTERPRETATION  Her electrodiagnostic findings of carpal tunnel syndrome bilaterally are consistent with her hand symptoms      Imaging:     None indicated        ICD-10-CM ICD-9-CM    1. De Quervain's tenosynovitis, right  M65.4 727.04             Plan:     Proceed as scheduled for right wrist first dorsal compartment release. The patient was counseled at length about the risks of emilee Covid-19 during their perioperative period and any recovery window from their procedure. The patient was made aware that emilee Covid-19  may worsen their prognosis for recovering from their procedure and lend to a higher morbidity and/or mortality risk. All material risks, benefits, and reasonable alternatives including postponing the procedure were discussed. The patient does  wish to proceed with the procedure at this time. This procedure has been fully reviewed with the patient and written informed consent has been obtained. Follow-up and Dispositions    Return for postop.           Plan was reviewed with patient, who verbalized agreement and understanding of the plan

## 2022-11-21 DIAGNOSIS — M65.4 DE QUERVAIN'S TENOSYNOVITIS, RIGHT: ICD-10-CM

## 2022-11-21 DIAGNOSIS — Z98.890 STATUS POST DE QUERVAIN'S RELEASE SURGERY: Primary | ICD-10-CM

## 2022-11-21 RX ORDER — DICLOFENAC SODIUM 75 MG/1
75 TABLET, DELAYED RELEASE ORAL 2 TIMES DAILY WITH MEALS
Qty: 14 TABLET | Refills: 0 | Status: SHIPPED | OUTPATIENT
Start: 2022-11-21 | End: 2022-11-28

## 2022-12-07 ENCOUNTER — OFFICE VISIT (OUTPATIENT)
Dept: ORTHOPEDIC SURGERY | Age: 37
End: 2022-12-07
Payer: COMMERCIAL

## 2022-12-07 VITALS — WEIGHT: 278 LBS | BODY MASS INDEX: 46.32 KG/M2 | HEIGHT: 65 IN | TEMPERATURE: 97.7 F | RESPIRATION RATE: 16 BRPM

## 2022-12-07 DIAGNOSIS — Z98.890 STATUS POST DE QUERVAIN'S RELEASE SURGERY: ICD-10-CM

## 2022-12-07 DIAGNOSIS — M65.4 DE QUERVAIN'S TENOSYNOVITIS, RIGHT: Primary | ICD-10-CM

## 2022-12-07 PROCEDURE — 99024 POSTOP FOLLOW-UP VISIT: CPT | Performed by: ORTHOPAEDIC SURGERY

## 2022-12-07 NOTE — LETTER
12/7/2022    Patient: Carrie Mann   YOB: 1985   Date of Visit: 12/7/2022     Sanya Smith NP  [address] [facility]  [address]  [address] [facility]    Dear Sanya Smith NP,      Thank you for referring Ms. Jasmin Cerda to [address] [place] [address] for evaluation. My notes for this consultation are attached. If you have questions, please do not hesitate to call me. I look forward to following your patient along with you.       Sincerely,    David Mendiola, DO

## 2022-12-07 NOTE — PROGRESS NOTES
Ml Wilkinson is a 40 y.o. female right handed unspecified employment. Worker's Compensation and legal considerations: none filed. Vitals:    12/07/22 1354   Resp: 16   Temp: 97.7 °F (36.5 °C)   TempSrc: Temporal   Weight: 278 lb (126.1 kg)   Height: 5' 5\" (1.651 m)   PainSc:   8   PainLoc: Hand           Chief Complaint   Patient presents with    Surgical Follow-up     Rt first dorsal compartment release       HPI: Patient presents today 2 weeks status post right first dorsal compartment release. She reports continued pain at the wrist with a feeling of popping. Preop HPI: Patient returns today for right wrist preop visit as she is scheduled for a first dorsal compartment release. 9/23/2022 HPI: Patient presents today for follow-up after right first dorsal compartment injection. Initially she had her symptoms relieved and continue to wear the brace however over the past couple weeks her pain has come back significantly. 8/10/2022 HPI: Patient presents today with pain in her wrist on the thumb side of her wrist.    2 weeks HPI: Patient presents 2 weeks status post right carpal tunnel release. She reports minimal pain around the hand and reports the numbness to be completely resolved. Preop HPI: Patient returns today for bilateral upper extremity EMG follow-up. She received bilateral carpal tunnel injections that significantly helped. However she is having some pain in the left side after having the nerve study specifically about the thumb wrist and distal forearm. Initial HPI: Patient presents today with complaints right wrist pain numbness and tingling with occasional left-sided numbness and tingling. Date of onset: Indeterminate    Injury: No    Prior Treatment:  Yes: Comment: Right carpal tunnel release. Right first dorsal release.   Bilateral carpal tunnel injections    Numbness/ Tingling: Yes: Comment: Right much worse than left      ROS: Review of Systems - General ROS: negative  Psychological ROS: negative  ENT ROS: negative  Allergy and Immunology ROS: negative  Hematological and Lymphatic ROS: negative  Respiratory ROS: no cough, shortness of breath, or wheezing  Cardiovascular ROS: no chest pain or dyspnea on exertion  Gastrointestinal ROS: no abdominal pain, change in bowel habits, or black or bloody stools  Musculoskeletal ROS: positive for - pain in arm - right  Neurological ROS: Negative  Dermatological ROS: negative    Past Medical History:   Diagnosis Date    Allergic conjunctivitides     allergic conjuctivitis    Anemia NEC     Contact dermatitis and other eczema, due to unspecified cause     HS Bilat axilla    Generalized headaches 4/26/2010    Ill-defined condition     Hydrodenitis    Seasonal allergic rhinitis     Sleep apnea     does not use cpap       Past Surgical History:   Procedure Laterality Date    HX GYN      vaginal delivery x 2    HX OTHER SURGICAL  2010    hydrodenitis removed from arm pit and drained    HX WRIST FRACTURE TX      [place] LAP,CHOLECYSTECTOMY N/A 10/06/2016    Dr. Russell Villegas       Current Outpatient Medications   Medication Sig Dispense Refill    semaglutide (OZEMPIC SC) by SubCUTAneous route. montelukast (SINGULAIR) 10 mg tablet Take 10 mg by mouth daily. NUVARING 0.12-0.015 mg/24 hr vaginal ring INSERT 1 RING VAGINALLY FOR 3 WEEKS THEN REMOVE FOR 1 WEEK AND INSERT NEW RING 1 Device 12       Allergies   Allergen Reactions    Zithromax [Azithromycin] Rash    Percocet [Oxycodone-Acetaminophen] Nausea Only    Norco [Hydrocodone-Acetaminophen] Rash           PE:     Physical Exam  Vitals and nursing note reviewed. Constitutional:       General: She is not in acute distress. Appearance: Normal appearance. She is not ill-appearing, toxic-appearing or diaphoretic. Cardiovascular:      Pulses: Normal pulses. Pulmonary:      Effort: Pulmonary effort is normal. No respiratory distress. Abdominal:      General: Abdomen is flat.  There is no distension. Musculoskeletal:         General: Swelling and tenderness present. No deformity or signs of injury. Cervical back: Normal range of motion and neck supple. Right lower leg: No edema. Left lower leg: No edema. Skin:     General: Skin is warm and dry. Capillary Refill: Capillary refill takes less than 2 seconds. Findings: No bruising or erythema. Neurological:      General: No focal deficit present. Mental Status: She is alert and oriented to person, place, and time. Cranial Nerves: No cranial nerve deficit. Sensory: No sensory deficit. Psychiatric:         Mood and Affect: Mood normal.         Behavior: Behavior normal.        Right wrist: Incision clean dry and intact with no drainage breakdown erythema or any signs of infection. Edema noted as well as tenderness over the incision site. NCV & EMG Findings:  Evaluation of the left median motor nerve showed decreased conduction velocity (Elbow-Wrist, 49 m/s). The right median motor nerve showed prolonged distal onset latency (5.2 ms). The left median sensory and the right median sensory nerves showed prolonged distal peak latency (L3.7, R4.3 ms) and decreased conduction velocity (Wrist-2nd Digit, L38, R33 m/s). All remaining nerves (as indicated in the following tables) were within normal limits. Left vs. Right side comparison data for the median motor nerve indicates abnormal L-R latency difference (1.2 ms). The median sensory nerve indicates abnormal L-R latency difference (0.6 ms). The ulnar sensory nerve indicates abnormal L-R latency difference (0.6 ms). All remaining left vs. right side differences were within normal limits. All examined muscles (as indicated in the following table) showed no evidence of electrical instability. INTERPRETATION     This is an abnormal electrodiagnostic examination. These findings may be consistent with:   1.  Moderate median mononeuropathy at the right wrist (carpal tunnel syndrome)   2. Minimal median mononeuropathy at the left wrist (carpal tunnel syndrome)     There is no electrodiagnostic evidence of any cervical radiculopathy, brachial plexopathy, peripheral polyneuropathy, or any other mononeuropathy. CLINICAL INTERPRETATION  Her electrodiagnostic findings of carpal tunnel syndrome bilaterally are consistent with her hand symptoms      Imaging:     None indicated        ICD-10-CM ICD-9-CM    1. De Quervain's tenosynovitis, right  M65.4 727.04 REFERRAL TO OCCUPATIONAL THERAPY      2. Status post de Quervain's release surgery  Z98.890 V45.89 REFERRAL TO OCCUPATIONAL THERAPY            Plan:     OT for range of motion exercises, edema control, and other modalities. Follow-up and Dispositions    Return in about 2 months (around 2/7/2023) for Reevaluation and OT follow-up.           Plan was reviewed with patient, who verbalized agreement and understanding of the plan

## 2022-12-09 ENCOUNTER — TELEPHONE (OUTPATIENT)
Dept: PHYSICAL THERAPY | Age: 37
End: 2022-12-09

## 2022-12-15 ENCOUNTER — HOSPITAL ENCOUNTER (OUTPATIENT)
Dept: PHYSICAL THERAPY | Age: 37
Discharge: HOME | End: 2022-12-15
Attending: ORTHOPAEDIC SURGERY
Payer: COMMERCIAL

## 2022-12-15 DIAGNOSIS — M65.4 DE QUERVAIN'S TENOSYNOVITIS, RIGHT: Primary | ICD-10-CM

## 2022-12-15 DIAGNOSIS — Z98.890 STATUS POST DE QUERVAIN'S RELEASE SURGERY: ICD-10-CM

## 2022-12-15 PROCEDURE — 97165 OT EVAL LOW COMPLEX 30 MIN: CPT

## 2022-12-15 PROCEDURE — 97535 SELF CARE MNGMENT TRAINING: CPT

## 2022-12-15 PROCEDURE — 97110 THERAPEUTIC EXERCISES: CPT

## 2022-12-15 NOTE — PROGRESS NOTES
Hand Therapy Evaluation and Daily Note    Patient Name: Lynnette Ramirez  Date:12/15/2022  : 1985  Age: 40 y.o.y/o  [x]  Patient  Verified  Payor: Fermín Ortiz / Plan: [facility] / Product Type: PPO /    Referring Provider: DO Cherri Beck MD Visit:  2023  Onset Date:  2022  Surgical Date: 2022  Surgical Procedure: status post right first dorsal compartment release    In time:10:05 AM  Out time:10:55 AM  Total Treatment Time (min): 50  Total Timed Codes (min): 25  1:1 Treatment Time (MC only): 50   Visit #: 1 of 8    Treatment Area: De Quervain's tenosynovitis, right [M65.4]  Status post de Quervain's release surgery [Z98.890]    Precautions:    Hand Dominance: right handed   Hand Involved: right    Total Evaluation Time:  25    History of Present Condition:  Patient is a right hand dominant 40 y.o. female with a chief complaint  of right wrist pain . 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 Right CTR    Diagnostic Tests: INTERPRETATION     This is an abnormal electrodiagnostic examination. These findings may be consistent with:   1. Moderate median mononeuropathy at the right wrist (carpal tunnel syndrome)   2. Minimal median mononeuropathy at the left wrist (carpal tunnel syndrome)     There is no electrodiagnostic evidence of any cervical radiculopathy, brachial plexopathy, peripheral polyneuropathy, or any other mononeuropathy.      CLINICAL INTERPRETATION  Her electrodiagnostic findings of carpal tunnel syndrome bilaterally are consistent with her hand symptoms     Prior Level of Function: (I) with ADL/IADL tasks without functional limitations and pain using dominant right hand     Current Level of Function:  Min A with ADL/IADL tasks     Social History: Pt lives in home with spouse, children and dog    Occupation/Job Requirements:     Observation: healing scar  Scar/incision:   2.5 cm intact and healed  Location:  right radial wrist      Palpation:  tenderness with palpation to scar    Range of Motion:     THUMB ROM CHART as measured in degrees  Thumb, Side  Active/Passive Date:  12/15/2022  Right   MP 14-42   IP 0-52   Radial Abd. 44   Palmar Abd. 55   Opposition WNL       Elbow/Wrist   Wrist 12/15/2022  right       Flex 26       Ext 43       UD 17       RD 12       FA         Pro        Sup 70         Strength:  NT due to pain    Sensation:    intact    Edema: GIRTH CHART measured in cm  Date: 12/15/2022     Side Right/Left    DPC circum.  21.0/20.6    Wrist Crease 19.1/19.1    FA      Elbow           Special Tests:   Nine-Hole Peg Test:  Left= 19 seconds  Right=24 seconds    ADLs  Feeding:        []MaxA   []ModA   []Remy   [] CGA   []SBA   []Madai   [x]Independent  UE Dressing:       []MaxA   []ModA   []Remy   [] CGA   []SBA   []Madai   [x]Independent  LE Dressing:       []MaxA   []ModA   [x]Remy   [] CGA   []SBA   []Madai   []Independent  Grooming:       []MaxA   []ModA   [x]Remy   [] CGA   []SBA   []Madai   []Independent  Toileting:       []MaxA   []ModA   []Remy   [] CGA   []SBA   []Madai   [x]Independent  Bathing:       []MaxA   []ModA   []Remy   [] CGA   []SBA   []Madai   [x]Independent  Light Meal Prep:    []MaxA   []ModA   [x]Remy   [] CGA   []SBA   []Madai   []Independent  Household/Other: []MaxA   []ModA   [x]Remy   [] CGA   []SBA   []Madai   []Independent  Adaptive Equip:     []MaxA   []ModA   []Remy   [] CGA   []SBA   []Madai   []Independent  Driving:       []MaxA   []ModA   []Remy   [] CGA   []SBA   []Madai   [x]Independent      Todays Treatment:  Patient received an initial evaluation today followed by education as to diagnosis, precautions and treatment plan. Patient was provided with a basic home exercise program including thumb and wrist AROM. Pt issued silicone gel sheet for scar, home desensitization program, compression garment for wrist, and foam tubing to assist with handwriting.        OBJECTIVE  Modality rationale: decrease pain and increase tissue extensibility to improve the patients ability to move right wrist for functional tasks   Min Type Additional Details    [] Estim:  []Unatt       []IFC  []Premod                        []Other:  []w/ice   []w/heat  Position:  Location:    [] Estim: []Att    []TENS instruct  []NMES                    []Other:  []w/US   []w/ice   []w/heat  Position:  Location:    []  Traction: [] Cervical       []Lumbar                       [] Prone          []Supine                       []Intermittent   []Continuous Lbs:  [] before manual  [] after manual    []  Ultrasound: []Continuous   [] Pulsed                           []1MHz   []3MHz W/cm2:  Location:    []  Iontophoresis with dexamethasone         Location: [] Take home patch   [] In clinic   5 concurrent with self care []  Ice     [x]  Heat MHP  []  Ice massage  []  Laser   []  Paraffin Position: seated, resting  Location: right wrist    []  Laser with stim  []  Other:  Position:  Location:    []  Vasopneumatic Device Pressure:       [] lo [] med [] hi   Temperature: [] lo [] med [] hi       [x] Skin assessment post-treatment:  [x]intact [x]redness- no adverse reaction    8 min Therapeutic Exercise:  [x] See flow sheet :   Rationale: increase ROM to improve the patients ability to move right wrist and thumb for grooming, bathing, and dressing tasks using right hand    17 min Self Care/Home Management: prognosis, diagnosis, activity modifications, treatment plan, wrist and thumb spica wear, edema mgmt, desensitization techniques, scar mgmt   Rationale:  education   to improve the patients ability to reduce symptoms and improve functional use of right hand    With   [] TE   [] TA   [] neuro   [] other: Patient Education: [x] Review HEP    [] Progressed/Changed HEP based on:   [] positioning   [] body mechanics   [] transfers   [] heat/ice application   [] Splint wear/care   [] Sensory re-education   [] scar management      [] other:      Pain Level (0-10 scale) post treatment: 7/10    Patient will continue to benefit from skilled OT services to modify and progress therapeutic interventions, address ROM deficits, address strength deficits, analyze and address soft tissue restrictions, and instruct in home and community integration to attain goals. Assessment: Pt presents to skilled OT today rating her pain level 8/10 in the right wrist and rest that is worse with use. There is a 2.5 cm scar intact on the right radial wrist and tenderness with palpation to the scar. Her right thumb AROM is limited to 14 deg extension and 42 deg flexion at the MCP jt. Her wrist AROM is limited to 26 deg flexion and 43 deg extension.   There is mild post surgical edema noted in the right hand and wrist.  She demonstrates impaired FM coordination and dexterity skills with the 9 hole peg test.     Evaluation Complexity: History LOW Complexity : Brief history review  Examination LOW Complexity : 1-3 performance deficits relating to physical, cognitive , or psychosocial skils that result in activity limitations and / or participation restrictions  Clinical Decision Making LOW Complexity : No comorbidities that affect functional and no verbal or physical assistance needed to complete eval tasks   Overall Complexity Rating: LOW     Patient would benefit from OT/Hand therapy services for the following problems:  Problem List: Pain effecting function, Decreased range of motion, Decreased strength, Edema effecting function, Decreased coordination/prehension, Decreased ADL/functional abilities , Decreased activity tolerance, Decreased flexibility/joint mobility, and Sensability     Treatment Plan may include any combination of the following: Therapeutic exercise, Therapeutic activities, Neuromuscular re-education, Physical agent/modality, Scar management, Manual therapy, Splinting/orthoses, Patient education, and ADLs/IADLs    Patient / Family readiness to learn indicated by: asking questions, trying to perform skills, and interest    Persons(s) to be included in education:   patient (P)    Barriers to Learning/Limitations: None    Patient Goal (s): reduce swelling and relieve pain    Patient Self Reported Health Status: good    Rehabilitation Potential: good    Short Term Goals: To be accomplished in 2  weeks:  Goal:* Patient will be compliant with initial home exercise program to take an active role in their rehabilitation process. Status at Eval:    Goal:* Patient will demonstrate a good understanding of their condition and strategies for self-management. Status at Eval:    Long Term Goals: To be accomplished in 4 weeks:   Goal:* Patient will have 55 degrees of right wrist extension in order to increase ease with ADL's such as bathing, eating and dressing and to eventually bear weight thru the right upper extremity as in pushing up from a chair. Status at Eval: 43 deg     Goal:* Patient will have 45 degrees of right wrist flexion in order to perform hygiene tasks and /or work with tools such as a screwdriver. Status at Eval: 26 deg     Goal:* Patient will have 80 degrees of right forearm supination in order to perform ADL's including washing face and accepting change. Status at Eval: 70 deg     Goal:*Patient will regain 50 degrees flexion of the right thumb MCP jt to enable grasp of cylindrical objects such as a glass, handle or toothbrush. Status at Eval: 42 deg    Goal:*Patient will attain 5 degrees or less of right thumb extension to enable him/ her to grab and carry a cylindrical object.   Status at Eval: 14 deg    Goal:* Patient will show a 26 point improvement on FOTO functional status measure to improve overall functional performance. Status at Eval:33    Goal:* Pt will improve 9 hole peg test score by 10% using right hand in order to improve use of right hand for handwriting tasks.   Status at eval: right 24 seconds      Frequency / Duration: Patient to be seen 2 times per week for 4 weeks:    Patient/ Caregiver education and instruction: Diagnosis, prognosis, self care, activity modification, brace/ splint application, and exercises      Paty Alonso OT, 12/15/2022 10:03 AM

## 2022-12-15 NOTE — PROGRESS NOTES
[facility]  [address]  [place], [address]  [phone] fax    Plan of Care/Statement of Necessity for Occupational Therapy Services    Patient name: Angelica Justin Start of Care: 12/15/2022   Referral source: Malaika Olsen,  : 1985    Medical Diagnosis: Sandy Buttery Quervain's tenosynovitis, right [M65.4]  Status post de Quervain's release surgery [Z98.890]  Payor: BLUE CROSS / Plan: [facility] / Product Type: PPO /  Onset Date:sx: 22    Treatment Diagnosis: right wrist pain   Prior Hospitalization: see medical history Provider#: [id number]   Medications: Verified on Patient summary List    Comorbidities: Right CTR   Prior Level of Function: (I) with ADL/IADL tasks without functional limitations and pain using dominant right hand           The Plan of Care and following information is based on the information from the initial evaluation. Assessment/ key information: Patient is a right hand dominant 40 y.o. female with a chief complaint  of right wrist pain s/p right first dorsal compartment release on 2022. Pt presents to skilled OT today rating her pain level 8/10 in the right wrist and rest that is worse with use. There is a 2.5 cm scar intact on the right radial wrist and tenderness with palpation to the scar. Her right thumb AROM is limited to 14 deg extension and 42 deg flexion at the MCP jt. Her wrist AROM is limited to 26 deg flexion and 43 deg extension. There is mild post surgical edema noted in the right hand and wrist.  She demonstrates impaired FM coordination and dexterity skills with the 9 hole peg test. Patient received an initial evaluation today followed by education as to diagnosis, precautions and treatment plan. Patient was provided with a basic home exercise program including thumb and wrist AROM.  Pt issued silicone gel sheet for scar, home desensitization program, compression garment for wrist, and foam tubing to assist with handwriting. Skilled OT services are necessary to address aforementioned deficits to improve quality of life. Evaluation Complexity: History LOW Complexity : Brief history review  Examination LOW Complexity : 1-3 performance deficits relating to physical, cognitive , or psychosocial skils that result in activity limitations and / or participation restrictions  Clinical Decision Making LOW Complexity : No comorbidities that affect functional and no verbal or physical assistance needed to complete eval tasks   Overall Complexity Rating: LOW     Patient would benefit from OT/Hand therapy services for the following problems:  Problem List: Pain effecting function, Decreased range of motion, Decreased strength, Edema effecting function, Decreased coordination/prehension, Decreased ADL/functional abilities , Decreased activity tolerance, Decreased flexibility/joint mobility, and Sensability     Treatment Plan may include any combination of the following: Therapeutic exercise, Therapeutic activities, Neuromuscular re-education, Physical agent/modality, Scar management, Manual therapy, Splinting/orthoses, Patient education, and ADLs/IADLs    Patient / Family readiness to learn indicated by: asking questions, trying to perform skills, and interest    Persons(s) to be included in education:   patient (P)    Barriers to Learning/Limitations: None    Patient Goal (s): reduce swelling and relieve pain    Patient Self Reported Health Status: good    Rehabilitation Potential: good    Short Term Goals: To be accomplished in 2  weeks:  Goal:* Patient will be compliant with initial home exercise program to take an active role in their rehabilitation process. Status at Eval:    Goal:* Patient will demonstrate a good understanding of their condition and strategies for self-management. Status at Eval:    Long Term Goals:  To be accomplished in 4 weeks:   Goal:* Patient will have 55 degrees of right wrist extension in order to increase ease with ADL's such as bathing, eating and dressing and to eventually bear weight thru the right upper extremity as in pushing up from a chair. Status at Eval: 43 deg     Goal:* Patient will have 45 degrees of right wrist flexion in order to perform hygiene tasks and /or work with tools such as a screwdriver. Status at Eval: 26 deg     Goal:* Patient will have 80 degrees of right forearm supination in order to perform ADL's including washing face and accepting change. Status at Eval: 70 deg     Goal:*Patient will regain 50 degrees flexion of the right thumb MCP jt to enable grasp of cylindrical objects such as a glass, handle or toothbrush. Status at Eval: 42 deg    Goal:*Patient will attain 5 degrees or less of right thumb extension to enable him/ her to grab and carry a cylindrical object. Status at Eval: 14 deg    Goal:* Patient will show a 26 point improvement on FOTO functional status measure to improve overall functional performance. Status at Eval:33    Goal:* Pt will improve 9 hole peg test score by 10% using right hand in order to improve use of right hand for handwriting tasks. Status at eval: right 24 seconds      Frequency / Duration: Patient to be seen 2 times per week for 4 weeks:    Patient/ Caregiver education and instruction: Diagnosis, prognosis, self care, activity modification, brace/ splint application, and exercises    [x]  Plan of care has been reviewed with Venecia Amaro OT 12/15/2022 2:38 PM  ________________________________________________________________________    I certify that the above Therapy Services are being furnished while the patient is under my care. I agree with the treatment plan and certify that this therapy is necessary.     Physician's Signature:____________Date:_________TIME:________     Antoni Clarke DO  ** Signature, Date and Time must be completed for valid certification **    Please sign and return to [facility]  [address]  [place], [address]  [phone] fax

## 2022-12-22 ENCOUNTER — HOSPITAL ENCOUNTER (OUTPATIENT)
Dept: PHYSICAL THERAPY | Age: 37
Discharge: HOME | End: 2022-12-22
Attending: ORTHOPAEDIC SURGERY
Payer: COMMERCIAL

## 2022-12-22 PROCEDURE — 97110 THERAPEUTIC EXERCISES: CPT

## 2022-12-22 PROCEDURE — 97530 THERAPEUTIC ACTIVITIES: CPT

## 2022-12-22 NOTE — PROGRESS NOTES
OT DAILY TREATMENT NOTE     Patient Name: Kathalene Duverney  Date:2022  : 1985  [x]  Patient  Verified  Payor: BLUE CROSS / Plan: [employer] [place] [address] [employer] / Product Type: PPO /    In time:5:00 PM  Out time:5:34 PM  Total Treatment Time (min): 34  Visit #: 2 of 8    Medicare/BCBS Only   Total Timed Codes (min):  34 1:1 Treatment Time:  34     Treatment Area: Radial styloid tenosynovitis (de quervain) [M65.4]    SUBJECTIVE  Pain Level (0-10 scale): 5/10  Any medication changes, allergies to medications, adverse drug reactions, diagnosis change, or new procedure performed?: [x] No    [] Yes (see summary sheet for update)  Subjective functional status/changes:   [] No changes reported  \"It's doing better. I like the silicone gel so I look forward to that at night. \"    OBJECTIVE    Modality rationale: decrease pain and increase tissue extensibility to improve the patients ability to move right wrist and thumb for functional tasks    Min Type Additional Details    [] Estim:  []Unatt       []IFC  []Premod                        []Other:  []w/ice   []w/heat  Position:  Location:    [] Estim: []Att    []TENS instruct  []NMES                    []Other:  []w/US   []w/ice   []w/heat  Position:  Location:    []  Traction: [] Cervical       []Lumbar                       [] Prone          []Supine                       []Intermittent   []Continuous Lbs:  [] before manual  [] after manual    []  Ultrasound: []Continuous   [] Pulsed                           []1MHz   []3MHz W/cm2:  Location:    []  Iontophoresis with dexamethasone         Location: [] Take home patch   [] In clinic   5 concurrent with self care []  Ice     [x]  Heat MHP  []  Ice massage  []  Laser   []  Paraffin Position: seated, resting  Location: right wrist    []  Laser with stim  []  Other:  Position:  Location:    []  Vasopneumatic Device    []  Right     []  Left  Pre-treatment girth:  Post-treatment girth:  Measured at (location): Pressure:       [] lo [] med [] hi   Temperature: [] lo [] med [] hi       [x] Skin assessment post-treatment:  [x]intact [x]redness- no adverse reaction    []redness - adverse reaction:     14 min Therapeutic Exercise:  [x] See flow sheet :   Rationale: increase ROM and increase strength to improve the patients ability to grasp and move right wrist for grooming, bathing and dressing tasks. Right hand:    Wrist AROM  Ball rolls wrist flex/ext  Towel scrunches     10 min Therapeutic Activity:  [x]  See flow sheet :   Rationale: increase ROM and improve coordination  to improve the patients ability to manipulate items using right hand and reduce hypersensitivity to right wrist.     Right hand:    Thumb opposition with foam pieces  Dexterity balls  Desensitization with cotton ball, moleskin, and Velcro loop - 1 minute each      10 min Self Care/Home Management: edema mgmt, scar mgmt, compression garment wear   Rationale:  education   to improve the patients ability to reduce symptoms and improve functional use of right wrist and hand      With   [] TE   [] TA   [] neuro   [] other: Patient Education: [x] Review HEP    [] Progressed/Changed HEP based on:   [] positioning   [] body mechanics   [] transfers   [] heat/ice application   [] Splint wear/care   [] Sensory re-education   [] scar management      [] other:            Other Objective/Functional Measures:   Good wrist AROM  Improved tolerance to textures     Pain Level (0-10 scale) post treatment: 5/10    ASSESSMENT/Changes in Function: Pt demo good wrist AROM with HEP compliance and wearing compression garment to reduce edema. Pt verbalizes use of cocoa butter for scar massage and wearing silicone gel patch on scar. Good tolerance to activities this treatment session.      Patient will continue to benefit from skilled OT services to modify and progress therapeutic interventions, address ROM deficits, address strength deficits, analyze and address soft tissue restrictions, and instruct in home and community integration to attain remaining goals. []  See Plan of Care  []  See progress note/recertification  []  See Discharge Summary         Progress towards goals / Updated goals:  Short Term Goals: To be accomplished in 2  weeks:  Goal:* Patient will be compliant with initial home exercise program to take an active role in their rehabilitation process. Status at Eval:  Patient was provided with a basic home exercise program including thumb and wrist AROM. 12/22/2022 - pt reports HEP compliance 1x daily    Goal:* Patient will demonstrate a good understanding of their condition and strategies for self-management. Status at Eval: pt educated on prognosis, diagnosis, activity modifications, treatment plan, wrist and thumb spica wear, edema mgmt, desensitization techniques, scar mgmt  12/22/2022 -pt reports compliance silicone gel patch and desensitization techniques      Long Term Goals: To be accomplished in 4 weeks:          Goal:* Patient will have 55 degrees of right wrist extension in order to increase ease with ADL's such as bathing, eating and dressing and to eventually bear weight thru the right upper extremity as in pushing up from a chair. Status at Eval: 43 deg      Goal:* Patient will have 45 degrees of right wrist flexion in order to perform hygiene tasks and /or work with tools such as a screwdriver. Status at Eval: 26 deg      Goal:* Patient will have 80 degrees of right forearm supination in order to perform ADL's including washing face and accepting change. Status at Eval: 70 deg                 Goal:*Patient will regain 50 degrees flexion of the right thumb MCP jt to enable grasp of cylindrical objects such as a glass, handle or toothbrush. Status at Eval: 42 deg     Goal:*Patient will attain 5 degrees or less of right thumb extension to enable him/ her to grab and carry a cylindrical object.   Status at Eval: 14 deg     Goal:* Patient will show a 26 point improvement on FOTO functional status measure to improve overall functional performance. Status at Eval:33     Goal:* Pt will improve 9 hole peg test score by 10% using right hand in order to improve use of right hand for handwriting tasks.   Status at eval: right 24 seconds     PLAN  [x]  Upgrade activities as tolerated     [x]  Continue plan of care  []  Update interventions per flow sheet       []  Discharge due to:_  []  Other:_      Lesa Cain OT 12/22/2022  4:58 PM    Future Appointments   Date Time Provider Pietro Marmolejo   12/22/2022  5:00 PM Sean Sers, OT [facility]   12/28/2022 12:00 PM FEDERICO Nicole [facility]   1/3/2023  3:30 PM Saen Sers, OT [facility]   1/5/2023  3:00 PM Sean Sers, OT [facility]   2/8/2023 10:10 AM German Del Real DO [facility]

## 2022-12-28 ENCOUNTER — TELEPHONE (OUTPATIENT)
Dept: PHYSICAL THERAPY | Age: 37
End: 2022-12-28

## 2022-12-28 ENCOUNTER — APPOINTMENT (OUTPATIENT)
Dept: PHYSICAL THERAPY | Age: 37
End: 2022-12-28
Attending: ORTHOPAEDIC SURGERY
Payer: COMMERCIAL

## 2023-01-03 ENCOUNTER — TELEPHONE (OUTPATIENT)
Dept: PHYSICAL THERAPY | Age: 38
End: 2023-01-03

## 2023-01-05 ENCOUNTER — HOSPITAL ENCOUNTER (OUTPATIENT)
Dept: PHYSICAL THERAPY | Age: 38
Discharge: HOME | End: 2023-01-05
Attending: ORTHOPAEDIC SURGERY
Payer: COMMERCIAL

## 2023-01-05 PROCEDURE — 97018 PARAFFIN BATH THERAPY: CPT

## 2023-01-05 PROCEDURE — 97110 THERAPEUTIC EXERCISES: CPT

## 2023-01-05 NOTE — PROGRESS NOTES
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 \"    OBJECTIVE    Modality rationale: decrease pain and increase tissue extensibility to improve the patients ability to move right wrist and thumb for functional tasks    Min Type Additional Details    [] Estim:  []Unatt       []IFC  []Premod                        []Other:  []w/ice   []w/heat  Position:  Location:    [] Estim: []Att    []TENS instruct  []NMES                    []Other:  []w/US   []w/ice   []w/heat  Position:  Location:    []  Traction: [] Cervical       []Lumbar                       [] Prone          []Supine                       []Intermittent   []Continuous Lbs:  [] before manual  [] after manual    []  Ultrasound: []Continuous   [] Pulsed                           []1MHz   []3MHz W/cm2:  Location:    []  Iontophoresis with dexamethasone         Location: [] Take home patch   [] In clinic   10 []  Ice     []  heat  []  Ice massage  []  Laser   [x]  Paraffin Position: seated, resting  Location: right wrist    []  Laser with stim  []  Other:  Position:  Location:    []  Vasopneumatic Device    []  Right     []  Left  Pre-treatment girth:  Post-treatment girth:  Measured at (location):  Pressure:       [] lo [] med [] hi   Temperature: [] lo [] med [] hi       [x] Skin assessment post-treatment:  [x]intact [x]redness- no adverse reaction    []redness - adverse reaction:      17 min Therapeutic Exercise:  [x] See flow sheet :   Rationale: increase ROM and increase strength to improve the patients ability to grasp and move right wrist for grooming, bathing and dressing tasks. Right hand:     Wrist AROM  Ball rolls wrist flex/ext  Towel scrunches   Opposition with foam pieces   Rye translation     With   [] TE   [] TA   [] neuro   [] other: Patient Education: [x] Review HEP    [] Progressed/Changed HEP based on:   [] positioning   [] body mechanics   [] transfers   [] heat/ice application   [] Splint wear/care   [] Sensory re-education   [] scar management      [] other:            Other Objective/Functional Measures: improved tolerance to touch over radial wrist scar     Pain Level (0-10 scale) post treatment: 3/10    ASSESSMENT/Changes in Function: reduced pain with paraffin and activities. Initiated coin translation with mild difficulty noted. Issued compression garment and silicone gel for scar. Pt scheduled out remaining appts. Patient will continue to benefit from skilled OT services to modify and progress therapeutic interventions, address ROM deficits, address strength deficits, analyze and address soft tissue restrictions, and instruct in home and community integration to attain remaining goals. []  See Plan of Care  []  See progress note/recertification  []  See Discharge Summary         Progress towards goals / Updated goals:  Short Term Goals: To be accomplished in 2  weeks:  Goal:* Patient will be compliant with initial home exercise program to take an active role in their rehabilitation process. Status at [facility]:  Patient was provided with a basic home exercise program including thumb and wrist AROM.    12/22/2022 - pt reports HEP compliance 1x daily     Goal:* Patient will demonstrate a good understanding of their condition and strategies for self-management. Status at Eval: pt educated on prognosis, diagnosis, activity modifications, treatment plan, wrist and thumb spica wear, edema mgmt, desensitization techniques, scar mgmt  12/22/2022 -pt reports compliance silicone gel patch and desensitization techniques   1/5/2023 - improved tolerance to touch     Long Term Goals: To be accomplished in 4 weeks:          Goal:* Patient will have 55 degrees of right wrist extension in order to increase ease with ADL's such as bathing, eating and dressing and to eventually bear weight thru the right upper extremity as in pushing up from a chair. Status at Eval: 43 deg      Goal:* Patient will have 45 degrees of right wrist flexion in order to perform hygiene tasks and /or work with tools such as a screwdriver. Status at Eval: 26 deg      Goal:* Patient will have 80 degrees of right forearm supination in order to perform ADL's including washing face and accepting change. Status at Eval: 70 deg                 Goal:*Patient will regain 50 degrees flexion of the right thumb MCP jt to enable grasp of cylindrical objects such as a glass, handle or toothbrush. Status at Eval: 42 deg     Goal:*Patient will attain 5 degrees or less of right thumb extension to enable him/ her to grab and carry a cylindrical object. Status at Eval: 14 deg     Goal:* Patient will show a 26 point improvement on FOTO functional status measure to improve overall functional performance. Status at Eval:33     Goal:* Pt will improve 9 hole peg test score by 10% using right hand in order to improve use of right hand for handwriting tasks.   Status at eval: right 24 seconds    PLAN  [x]  Upgrade activities as tolerated     [x]  Continue plan of care  []  Update interventions per flow sheet       []  Discharge due to:_  []  Other:_      Trey Garcia OT 1/5/2023  3:18 PM    Future Appointments   Date Time Provider Pietro Marmolejo   2/8/2023 10:10 AM Melissa Gold DO [facility] BS AMB

## 2023-01-12 ENCOUNTER — HOSPITAL ENCOUNTER (OUTPATIENT)
Dept: PHYSICAL THERAPY | Age: 38
Discharge: HOME | End: 2023-01-12
Attending: ORTHOPAEDIC SURGERY
Payer: COMMERCIAL

## 2023-01-12 PROCEDURE — 97535 SELF CARE MNGMENT TRAINING: CPT

## 2023-01-12 PROCEDURE — 97110 THERAPEUTIC EXERCISES: CPT

## 2023-01-12 NOTE — PROGRESS NOTES
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 Getting better actually. \"    OBJECTIVE    Modality rationale: decrease pain and increase tissue extensibility to improve the patients ability to move right wrist for functional tasks.     Min Type Additional Details    [] Estim:  []Unatt       []IFC  []Premod                        []Other:  []w/ice   []w/heat  Position:  Location:    [] Estim: []Att    []TENS instruct  []NMES                    []Other:  []w/US   []w/ice   []w/heat  Position:  Location:    []  Traction: [] Cervical       []Lumbar                       [] Prone          []Supine                       []Intermittent   []Continuous Lbs:  [] before manual  [] after manual    []  Ultrasound: []Continuous   [] Pulsed                           []1MHz   []3MHz W/cm2:  Location:    []  Iontophoresis with dexamethasone         Location: [] Take home patch   [] In clinic   5 concurrent with self care []  Ice     [x]  Heat MHP  []  Ice massage  []  Laser   []  Paraffin Position: seated, resting  Location: right wrist    []  Laser with stim  []  Other:  Position:  Location:    []  Vasopneumatic Device    []  Right     []  Left  Pre-treatment girth:  Post-treatment girth:  Measured at (location):  Pressure:       [] lo [] med [] hi Temperature: [] lo [] med [] hi       [x] Skin assessment post-treatment:  [x]intact [x]redness- no adverse reaction    []redness - adverse reaction:     22 min Therapeutic Exercise:  [x] See flow sheet :   Rationale: increase ROM, increase strength, and improve coordination to improve the patients ability to grasp,  and pinch using right hand. Right hand:    Recheck for PN    8 min Self Care/Home Management: activity modifications, scar mgmt, desensitization techniques    Rationale:  education   to improve the patients ability to reduce symptoms and increase functional use of right hand.      With   [] TE   [] TA   [] neuro   [] other: Patient Education: [x] Review HEP    [] Progressed/Changed HEP based on:   [] positioning   [] body mechanics   [] transfers   [] heat/ice application   [] Splint wear/care   [] Sensory re-education   [] scar management      [] other:            Other Objective/Functional Measures:   Range of Motion:     THUMB ROM CHART as measured in degrees  Thumb, Side  Active/Passive Date:  12/15/2022  Right 1/12/2023  Right   MP 14-42 5-60   IP 0-52 45   Radial Abd. 44    Palmar Abd. 55    Opposition WNL          Elbow/Wrist   Wrist 12/15/2022  right  1/12/2023  Right         Flex 26 48          Ext 43 62          UD 17           RD 12           FA              Pro             Sup 70  73            Strength:   Measurements: Taken with Arturo Dynamometer, in Lbs   Level 2 1/12/2023  Date   Right 48    Left 57    Deficit     Change           Pinch Measurements: Taken with Pinch Gauge, in Lbs   (hand) 1/12/2023    Lateral     Right 12   Left  18   Deficit    Change    Pad    Right 4   Left 8   Deficit    Change    Leeroy    Right 6   Left 12   Deficit    Change      Special Tests:   12/15/22 - Nine-Hole Peg Test:  Left= 19 seconds                     Right=24 seconds     1/12/2023 - Nine-Hole Peg Test: Right= 18 seconds     Pain Level (0-10 scale) post treatment: 3/10    ASSESSMENT/Changes in Function: Pt demonstrates improved thumb and wrist AROM, and improved FM skills. Assessed  and pinch strength and pt reported mild discomfort with this. Educated on wrist PROM HEP. Patient will continue to benefit from skilled OT services to modify and progress therapeutic interventions, address ROM deficits, address strength deficits, analyze and address soft tissue restrictions, and instruct in home and community integration to attain remaining goals. []  See Plan of Care  [x]  See progress note/recertification  []  See Discharge Summary         Progress towards goals / Updated goals:  Short Term Goals: To be accomplished in 2  weeks:  Goal:* Patient will be compliant with initial home exercise program to take an active role in their rehabilitation process. Status at Eval:  Patient was provided with a basic home exercise program including thumb and wrist AROM. 12/22/2022 - pt reports HEP compliance 1x daily   Status at PN 1/12/2023 - pt reports HEP compliance 1x per day, discussed progressing to 2x per day and pt reports understanding of this, goal met    Goal:* Patient will demonstrate a good understanding of their condition and strategies for self-management. Status at Eval: pt educated on prognosis, diagnosis, activity modifications, treatment plan, wrist and thumb spica wear, edema mgmt, desensitization techniques, scar mgmt  12/22/2022 -pt reports compliance silicone gel patch and desensitization techniques   1/5/2023 - improved tolerance to touch   Status at PN 1/12/2023 - pt reports use of silicone gel and desensitization strategies and activity modifications, goal met    Long Term Goals: To be accomplished in 4 weeks:          Goal:* Patient will have 55 degrees of right wrist extension in order to increase ease with ADL's such as bathing, eating and dressing and to eventually bear weight thru the right upper extremity as in pushing up from a chair.   Status at Eval: 43 deg   Status at PN 1/12/2023 - 62 deg, goal met     Goal:* Patient will have 45 degrees of right wrist flexion in order to perform hygiene tasks and /or work with tools such as a screwdriver. Status at Eval: 26 deg  Status at PN 1/12/2023 - 48 deg, goal met      Goal:* Patient will have 80 degrees of right forearm supination in order to perform ADL's including washing face and accepting change. Status at Eval: 70 deg  Status at PN 1/12/2023 - 73 deg, progressing                 Goal:*Patient will regain 50 degrees flexion of the right thumb MCP jt to enable grasp of cylindrical objects such as a glass, handle or toothbrush. Status at Eval: 42 deg   Status at PN 1/12/2023 - 60 deg, goal met    Goal:*Patient will attain 5 degrees or less of right thumb extension to enable him/ her to grab and carry a cylindrical object. Status at Eval: 14 deg   Status at PN 1/12/2023 - 5 deg, goal met    Goal:* Patient will show a 26 point improvement on FOTO functional status measure to improve overall functional performance. Status at Eval:33   Status at PN 1/12/2023 - 42, progressing    Goal:* Pt will improve 9 hole peg test score by 10% using right hand in order to improve use of right hand for handwriting tasks.   Status at eval: right 24 seconds  Status at PN 1/12/2023 - 18 seconds, 25% improved, goal met    PLAN  [x]  Upgrade activities as tolerated     [x]  Continue plan of care  []  Update interventions per flow sheet       []  Discharge due to:_  []  Other:_      Migel Davila OT 1/12/2023  3:11 PM    Future Appointments   Date Time Provider Pietro Marmolejo   1/19/2023  2:30 PM [facility]   [facility]2023  2:30 PM [facility]   2/8/2023 10:10 AM German Coker DO [facility] BS AMB

## 2023-01-12 NOTE — PROGRESS NOTES
[facility]  [address]  [place], [address]  [phone] fax    Occupational Therapy Progress Note  Patient name: Rohith Steinberg Start of Care: 12/15/2022   Referral source: Lena Marshall DO : 1985   Medical/Treatment Diagnosis: Radial styloid tenosynovitis (de quervain) [M65.4]  Payor: Kayla Mariano / Plan: [employer] / Product Type: PPO /  Onset Date:sx 22     Prior Hospitalization: see medical history Provider#: [id number]   Medications: Verified on Patient Summary List     Comorbidities: Right CTR   Prior Level of Function: (I) with ADL/IADL tasks without functional limitations and pain using dominant right hand     Visits from Start of Care: 4    Missed Visits: 2    Established Goals:   Short Term Goals: To be accomplished in 2  weeks:  Goal:* Patient will be compliant with initial home exercise program to take an active role in their rehabilitation process. Status at Eval:  Patient was provided with a basic home exercise program including thumb and wrist AROM. 2022 - pt reports HEP compliance 1x daily   Status at PN 2023 - pt reports HEP compliance 1x per day, discussed progressing to 2x per day and pt reports understanding of this, goal met    Goal:* Patient will demonstrate a good understanding of their condition and strategies for self-management. Status at Eval: pt educated on prognosis, diagnosis, activity modifications, treatment plan, wrist and thumb spica wear, edema mgmt, desensitization techniques, scar mgmt  2022 -pt reports compliance silicone gel patch and desensitization techniques   2023 - improved tolerance to touch   Status at PN 2023 - pt reports use of silicone gel and desensitization strategies and activity modifications, goal met    Long Term Goals:  To be accomplished in 4 weeks:          Goal:* Patient will have 55 degrees of right wrist extension in order to increase ease with ADL's such as bathing, eating and dressing and to eventually bear weight thru the right upper extremity as in pushing up from a chair. Status at Eval: 43 deg   Status at PN 1/12/2023 - 62 deg, goal met     Goal:* Patient will have 45 degrees of right wrist flexion in order to perform hygiene tasks and /or work with tools such as a screwdriver. Status at Eval: 26 deg  Status at PN 1/12/2023 - 48 deg, goal met      Goal:* Patient will have 80 degrees of right forearm supination in order to perform ADL's including washing face and accepting change. Status at Eval: 70 deg  Status at PN 1/12/2023 - 73 deg, progressing                 Goal:*Patient will regain 50 degrees flexion of the right thumb MCP jt to enable grasp of cylindrical objects such as a glass, handle or toothbrush. Status at Eval: 42 deg   Status at PN 1/12/2023 - 60 deg, goal met    Goal:*Patient will attain 5 degrees or less of right thumb extension to enable him/ her to grab and carry a cylindrical object. Status at Eval: 14 deg   Status at PN 1/12/2023 - 5 deg, goal met    Goal:* Patient will show a 26 point improvement on FOTO functional status measure to improve overall functional performance. Status at Eval:33   Status at PN 1/12/2023 - 42, progressing    Goal:* Pt will improve 9 hole peg test score by 10% using right hand in order to improve use of right hand for handwriting tasks. Status at eval: right 24 seconds  Status at PN 1/12/2023 - 18 seconds, 25% improved, goal met    Key Functional Changes: improved thumb and wrist AROM, reduced sensitivity to touch on scar    Updated Goals: to be achieved in 4 weeks:          Goal:* Patient will have 60 degrees of right wrist flexion in order to perform hygiene tasks and /or work with tools such as a screwdriver.   Status at PN 1/12/2023 - 48 deg      Goal:* Patient will have 80 degrees of right forearm supination in order to perform ADL's including washing face and accepting change. Status at PN 1/12/2023 - 73 deg    Goal:* Pt will have 55 pounds of  in the right hand to allow for functional grasp for all ADL activities including dressing, bathing and self care. Status at PN 1/12/2023 - 48#    Goal:* Patient will have improved right lateral pinch strength of  15 pounds in order to perform fine motor tasks such as turning pages, turning ignition and open containers. Status at PN 1/12/2023 - 12#    Goal:* Patient will have improved right tip pinch strength of 6 pounds in order to perform fine motor tasks such as zippiing up zippers or opening containers. Status at PN 1/12/2023- 4#    Goal:* Patient will have improved right marco pinch strength of 10 pounds in order to perform fine motor tasks such as writing. Status at PN 1/12/2023 : 6#    Goal:* Patient will show a 26 point improvement on FOTO functional status measure to improve overall functional performance.   Status at Eval:33   Status at PN 1/12/2023 - 42 (+9)  ASSESSMENT/RECOMMENDATIONS:  [x]Continue therapy per initial plan/protocol at a frequency of  2 x per week for 2 weeks  []Continue therapy with the following recommended changes:_____________________      _____________________________________________________________________  []Discontinue therapy progressing towards or have reached established goals  []Discontinue therapy due to lack of appreciable progress towards goals  []Discontinue therapy due to lack of attendance or compliance  []Await Physician's recommendations/decisions regarding therapy  []Other:________________________________________________________________    Thank you for this referral.   Ivonne Villarreal OT 1/12/2023 3:26 PM  NOTE TO PHYSICIAN:  [address]   FAX TO [facility]: [phone]  If you are unable to process this request in 24 hours please contact our office: [phone]    I have read the above report and request that my patient continue as recommended. I have read the above report and request that my patient continue therapy with the following changes/special instructions:__________________________________________________________  I have read the above report and request that my patient be discharged from therapy.     [de-identified] Signature:____________Date:_________TIME:________     Marlan Foot, DO  ** Signature, Date and Time must be completed for valid certification **

## 2023-01-26 ENCOUNTER — HOSPITAL ENCOUNTER (OUTPATIENT)
Dept: PHYSICAL THERAPY | Age: 38
Discharge: HOME | End: 2023-01-26
Attending: ORTHOPAEDIC SURGERY
Payer: COMMERCIAL

## 2023-01-26 PROCEDURE — 97535 SELF CARE MNGMENT TRAINING: CPT

## 2023-01-26 PROCEDURE — 97110 THERAPEUTIC EXERCISES: CPT

## 2023-01-26 NOTE — PROGRESS NOTES
OT DAILY TREATMENT NOTE     Patient Name: Radha Connolly  Date:2023  : 1985  [x]  Patient  Verified  Payor: BLUE CROSS / Plan: [employer] [place] [address] [place] / Product Type: PPO /    In time:2:37 PM  Out time:3:00 PM  Total Treatment Time (min): 23  Visit #: 1 of 4    Medicare/BCBS Only   Total Timed Codes (min):  23 1:1 Treatment Time:  23     Treatment Area: Radial styloid tenosynovitis (de quervain) [M65.4]    SUBJECTIVE  Pain Level (0-10 scale): 0/10  Any medication changes, allergies to medications, adverse drug reactions, diagnosis change, or new procedure performed?: [x] No    [] Yes (see summary sheet for update)  Subjective functional status/changes:   [] No changes reported  \"It still feels weird but it is getting a lot better. \"    OBJECTIVE    Modality rationale: decrease pain and increase tissue extensibility to improve the patients ability to functionally use right wrist.    Min Type Additional Details    [] Estim:  []Unatt       []IFC  []Premod                        []Other:  []w/ice   []w/heat  Position:  Location:    [] Estim: []Att    []TENS instruct  []NMES                    []Other:  []w/US   []w/ice   []w/heat  Position:  Location:    []  Traction: [] Cervical       []Lumbar                       [] Prone          []Supine                       []Intermittent   []Continuous Lbs:  [] before manual  [] after manual    []  Ultrasound: []Continuous   [] Pulsed                           []1MHz   []3MHz W/cm2:  Location:    []  Iontophoresis with dexamethasone         Location: [] Take home patch   [] In clinic   5 concurrent with self care []  Ice     [x]  Heat MHP  []  Ice massage  []  Laser   []  Paraffin Position: seated, resting  Location: right wrist    []  Laser with stim  []  Other:  Position:  Location:    []  Vasopneumatic Device    []  Right     []  Left  Pre-treatment girth:  Post-treatment girth:  Measured at (location):  Pressure:       [] lo [] med [] hi   Temperature: [] lo [] med [] hi       [x] Skin assessment post-treatment:  [x]intact []redness- no adverse reaction    []redness - adverse reaction:     15 min Therapeutic Exercise:  [x] See flow sheet :   Rationale: increase ROM, increase strength, and improve coordination to improve the patients ability to grasp, , manipulate small items using right hand    Right wrist:    Ball rolls wrist flex,ext AAROM  Wrist PROM - flex, ext, RD, UD  Byron translation  Digiflex 1.5# -  and individual    8 min Self Care/Home Management: treatment plan, scar mgmt   Rationale:  education   to improve the patients ability to reduce symptoms and improve functional use of right wrist and hand    With   [] TE   [] TA   [] neuro   [] other: Patient Education: [x] Review HEP    [] Progressed/Changed HEP based on:   [] positioning   [] body mechanics   [] transfers   [] heat/ice application   [] Splint wear/care   [] Sensory re-education   [] scar management      [] other:            Other Objective/Functional Measures:   Rest breaks required for coin translation     Pain Level (0-10 scale) post treatment: 0/10    ASSESSMENT/Changes in Function: discussed continued scar massage and desensitization strategies. Pt demo good wrist PROM and compliance with HEP. Initiated  strengthening with digiflex and pt reported no pain or discomfort with this. Pt is scheduled to continue her care next week at the [facility]. Patient will continue to benefit from skilled OT services to modify and progress therapeutic interventions, address ROM deficits, address strength deficits, analyze and address soft tissue restrictions, and instruct in home and community integration to attain remaining goals.      []  See Plan of Care  []  See progress note/recertification  []  See Discharge Summary         Progress towards goals / Updated goals:   Goal:* Patient will have 60 degrees of right wrist flexion in order to perform hygiene tasks and /or work with tools such as a screwdriver. Status at PN 1/12/2023 - 48 deg      Goal:* Patient will have 80 degrees of right forearm supination in order to perform ADL's including washing face and accepting change. Status at PN 1/12/2023 - 73 deg     Goal:* Pt will have 55 pounds of  in the right hand to allow for functional grasp for all ADL activities including dressing, bathing and self care. Status at PN 1/12/2023 - 48#  1/26/2023 - initiated digiflex with good tolerance noted     Goal:* Patient will have improved right lateral pinch strength of  15 pounds in order to perform fine motor tasks such as turning pages, turning ignition and open containers. Status at PN 1/12/2023 - 12#     Goal:* Patient will have improved right tip pinch strength of 6 pounds in order to perform fine motor tasks such as zippiing up zippers or opening containers. Status at PN 1/12/2023- 4#     Goal:* Patient will have improved right marco pinch strength of 10 pounds in order to perform fine motor tasks such as writing. Status at PN 1/12/2023 : 6#     Goal:* Patient will show a 26 point improvement on FOTO functional status measure to improve overall functional performance.   Status at Eval:33   Status at PN 1/12/2023 - 42 (+9)    PLAN  [x]  Upgrade activities as tolerated     [x]  Continue plan of care  []  Update interventions per flow sheet       []  Discharge due to:_  []  Other:_      Man Roberson OT 1/26/2023  2:42 PM    Future Appointments   Date Time Provider Pietro Marmolejo   2/8/2023 10:10 AM German Carlisle DO [facility] BS AMB

## 2023-02-01 ENCOUNTER — APPOINTMENT (OUTPATIENT)
Dept: GENERAL RADIOLOGY | Age: 38
End: 2023-02-01
Attending: STUDENT IN AN ORGANIZED HEALTH CARE EDUCATION/TRAINING PROGRAM
Payer: COMMERCIAL

## 2023-02-01 ENCOUNTER — APPOINTMENT (OUTPATIENT)
Dept: CT IMAGING | Age: 38
End: 2023-02-01
Attending: STUDENT IN AN ORGANIZED HEALTH CARE EDUCATION/TRAINING PROGRAM
Payer: COMMERCIAL

## 2023-02-01 ENCOUNTER — HOSPITAL ENCOUNTER (EMERGENCY)
Age: 38
Discharge: HOME | End: 2023-02-01
Attending: STUDENT IN AN ORGANIZED HEALTH CARE EDUCATION/TRAINING PROGRAM
Payer: COMMERCIAL

## 2023-02-01 VITALS
SYSTOLIC BLOOD PRESSURE: 142 MMHG | BODY MASS INDEX: 44.98 KG/M2 | WEIGHT: 270 LBS | HEIGHT: 65 IN | DIASTOLIC BLOOD PRESSURE: 82 MMHG | HEART RATE: 88 BPM | TEMPERATURE: 97.8 F | OXYGEN SATURATION: 100 % | RESPIRATION RATE: 18 BRPM

## 2023-02-01 DIAGNOSIS — R10.12 ABDOMINAL PAIN, LUQ (LEFT UPPER QUADRANT): Primary | ICD-10-CM

## 2023-02-01 LAB
ALBUMIN SERPL-MCNC: 3.3 G/DL (ref 3.4–5)
ALBUMIN/GLOB SERPL: 0.8 (ref 0.8–1.7)
ALP SERPL-CCNC: 78 U/L (ref 45–117)
ALT SERPL-CCNC: 17 U/L (ref 13–56)
ANION GAP SERPL CALC-SCNC: 6 MMOL/L (ref 3–18)
APPEARANCE UR: ABNORMAL
AST SERPL-CCNC: 8 U/L (ref 10–38)
ATRIAL RATE: 78 BPM
BACTERIA URNS QL MICRO: ABNORMAL /HPF
BASOPHILS # BLD: 0.1 K/UL (ref 0–0.1)
BASOPHILS NFR BLD: 1 % (ref 0–2)
BILIRUB SERPL-MCNC: 0.3 MG/DL (ref 0.2–1)
BILIRUB UR QL: NEGATIVE
BUN SERPL-MCNC: 12 MG/DL (ref 7–18)
BUN/CREAT SERPL: 16 (ref 12–20)
CALCIUM SERPL-MCNC: 8.5 MG/DL (ref 8.5–10.1)
CALCULATED P AXIS, ECG09: 57 DEGREES
CALCULATED R AXIS, ECG10: -3 DEGREES
CALCULATED T AXIS, ECG11: -9 DEGREES
CHLORIDE SERPL-SCNC: 109 MMOL/L (ref 100–111)
CO2 SERPL-SCNC: 23 MMOL/L (ref 21–32)
COLOR UR: YELLOW
CREAT SERPL-MCNC: 0.76 MG/DL (ref 0.6–1.3)
D DIMER PPP FEU-MCNC: 0.59 UG/ML(FEU)
DIAGNOSIS, 93000: NORMAL
DIFFERENTIAL METHOD BLD: ABNORMAL
EOSINOPHIL # BLD: 0.5 K/UL (ref 0–0.4)
EOSINOPHIL NFR BLD: 6 % (ref 0–5)
EPITH CASTS URNS QL MICRO: ABNORMAL /LPF (ref 0–5)
ERYTHROCYTE [DISTWIDTH] IN BLOOD BY AUTOMATED COUNT: 13.6 % (ref 11.6–14.5)
ETHANOL SERPL-MCNC: <3 MG/DL (ref 0–3)
GLOBULIN SER CALC-MCNC: 4.3 G/DL (ref 2–4)
GLUCOSE SERPL-MCNC: 105 MG/DL (ref 74–99)
GLUCOSE UR STRIP.AUTO-MCNC: NEGATIVE MG/DL
HCG SERPL QL: NEGATIVE
HCT VFR BLD AUTO: 36.3 % (ref 35–45)
HGB BLD-MCNC: 12 G/DL (ref 12–16)
HGB UR QL STRIP: NEGATIVE
IMM GRANULOCYTES # BLD AUTO: 0 K/UL (ref 0–0.04)
IMM GRANULOCYTES NFR BLD AUTO: 0 % (ref 0–0.5)
INR PPP: 1 (ref 0.8–1.2)
KETONES UR QL STRIP.AUTO: NEGATIVE MG/DL
LEUKOCYTE ESTERASE UR QL STRIP.AUTO: ABNORMAL
LIPASE SERPL-CCNC: 97 U/L (ref 73–393)
LYMPHOCYTES # BLD: 3.7 K/UL (ref 0.9–3.6)
LYMPHOCYTES NFR BLD: 46 % (ref 21–52)
MAGNESIUM SERPL-MCNC: 2.2 MG/DL (ref 1.6–2.6)
MCH RBC QN AUTO: 27.3 PG (ref 24–34)
MCHC RBC AUTO-ENTMCNC: 33.1 G/DL (ref 31–37)
MCV RBC AUTO: 82.5 FL (ref 78–100)
MONOCYTES # BLD: 0.5 K/UL (ref 0.05–1.2)
MONOCYTES NFR BLD: 7 % (ref 3–10)
MUCOUS THREADS URNS QL MICRO: ABNORMAL /LPF
NEUTS SEG # BLD: 3.4 K/UL (ref 1.8–8)
NEUTS SEG NFR BLD: 42 % (ref 40–73)
NITRITE UR QL STRIP.AUTO: NEGATIVE
NRBC # BLD: 0 K/UL (ref 0–0.01)
NRBC BLD-RTO: 0 PER 100 WBC
P-R INTERVAL, ECG05: 166 MS
PH UR STRIP: 6 (ref 5–8)
PLATELET # BLD AUTO: 260 K/UL (ref 135–420)
PMV BLD AUTO: 10.3 FL (ref 9.2–11.8)
POTASSIUM SERPL-SCNC: 3.9 MMOL/L (ref 3.5–5.5)
PROT SERPL-MCNC: 7.6 G/DL (ref 6.4–8.2)
PROT UR STRIP-MCNC: NEGATIVE MG/DL
PROTHROMBIN TIME: 13.5 SEC (ref 11.5–15.2)
Q-T INTERVAL, ECG07: 364 MS
QRS DURATION, ECG06: 82 MS
QTC CALCULATION (BEZET), ECG08: 414 MS
RBC # BLD AUTO: 4.4 M/UL (ref 4.2–5.3)
RBC #/AREA URNS HPF: NEGATIVE /HPF (ref 0–5)
SODIUM SERPL-SCNC: 138 MMOL/L (ref 136–145)
SP GR UR REFRACTOMETRY: 1.02 (ref 1–1.03)
TROPONIN I SERPL HS-MCNC: 4 NG/L (ref 0–54)
UROBILINOGEN UR QL STRIP.AUTO: 1 EU/DL (ref 0.2–1)
VENTRICULAR RATE, ECG03: 78 BPM
WBC # BLD AUTO: 8.2 K/UL (ref 4.6–13.2)
WBC URNS QL MICRO: ABNORMAL /HPF (ref 0–4)

## 2023-02-01 PROCEDURE — 96374 THER/PROPH/DIAG INJ IV PUSH: CPT

## 2023-02-01 PROCEDURE — 74011250637 HC RX REV CODE- 250/637: Performed by: STUDENT IN AN ORGANIZED HEALTH CARE EDUCATION/TRAINING PROGRAM

## 2023-02-01 PROCEDURE — 82077 ASSAY SPEC XCP UR&BREATH IA: CPT

## 2023-02-01 PROCEDURE — 74177 CT ABD & PELVIS W/CONTRAST: CPT

## 2023-02-01 PROCEDURE — 83690 ASSAY OF LIPASE: CPT

## 2023-02-01 PROCEDURE — 74011000636 HC RX REV CODE- 636: Performed by: STUDENT IN AN ORGANIZED HEALTH CARE EDUCATION/TRAINING PROGRAM

## 2023-02-01 PROCEDURE — 74011250636 HC RX REV CODE- 250/636: Performed by: STUDENT IN AN ORGANIZED HEALTH CARE EDUCATION/TRAINING PROGRAM

## 2023-02-01 PROCEDURE — 85025 COMPLETE CBC W/AUTO DIFF WBC: CPT

## 2023-02-01 PROCEDURE — 84484 ASSAY OF TROPONIN QUANT: CPT

## 2023-02-01 PROCEDURE — 83735 ASSAY OF MAGNESIUM: CPT

## 2023-02-01 PROCEDURE — 99285 EMERGENCY DEPT VISIT HI MDM: CPT

## 2023-02-01 PROCEDURE — 71046 X-RAY EXAM CHEST 2 VIEWS: CPT

## 2023-02-01 PROCEDURE — 85610 PROTHROMBIN TIME: CPT

## 2023-02-01 PROCEDURE — 81001 URINALYSIS AUTO W/SCOPE: CPT

## 2023-02-01 PROCEDURE — 80053 COMPREHEN METABOLIC PANEL: CPT

## 2023-02-01 PROCEDURE — 93005 ELECTROCARDIOGRAM TRACING: CPT

## 2023-02-01 PROCEDURE — 84703 CHORIONIC GONADOTROPIN ASSAY: CPT

## 2023-02-01 PROCEDURE — 74011000250 HC RX REV CODE- 250: Performed by: STUDENT IN AN ORGANIZED HEALTH CARE EDUCATION/TRAINING PROGRAM

## 2023-02-01 PROCEDURE — 85379 FIBRIN DEGRADATION QUANT: CPT

## 2023-02-01 RX ORDER — KETOROLAC TROMETHAMINE 15 MG/ML
15 INJECTION, SOLUTION INTRAMUSCULAR; INTRAVENOUS ONCE
Status: COMPLETED | OUTPATIENT
Start: 2023-02-01 | End: 2023-02-01

## 2023-02-01 RX ORDER — FAMOTIDINE 20 MG/1
20 TABLET, FILM COATED ORAL ONCE
Status: COMPLETED | OUTPATIENT
Start: 2023-02-01 | End: 2023-02-01

## 2023-02-01 RX ORDER — ACETAMINOPHEN 500 MG
1000 TABLET ORAL ONCE
Status: COMPLETED | OUTPATIENT
Start: 2023-02-01 | End: 2023-02-01

## 2023-02-01 RX ORDER — DICYCLOMINE HYDROCHLORIDE 10 MG/1
20 CAPSULE ORAL
Status: COMPLETED | OUTPATIENT
Start: 2023-02-01 | End: 2023-02-01

## 2023-02-01 RX ORDER — LIDOCAINE 4 G/100G
1 PATCH TOPICAL ONCE
Status: DISCONTINUED | OUTPATIENT
Start: 2023-02-01 | End: 2023-02-01 | Stop reason: HOSPADM

## 2023-02-01 RX ORDER — PANTOPRAZOLE SODIUM 40 MG/1
40 TABLET, DELAYED RELEASE ORAL DAILY
Qty: 30 TABLET | Refills: 0 | Status: SHIPPED | OUTPATIENT
Start: 2023-02-01 | End: 2023-03-03

## 2023-02-01 RX ADMIN — IOPAMIDOL 100 ML: 612 INJECTION, SOLUTION INTRAVENOUS at 04:46

## 2023-02-01 RX ADMIN — KETOROLAC TROMETHAMINE 15 MG: 15 INJECTION, SOLUTION INTRAMUSCULAR; INTRAVENOUS at 05:18

## 2023-02-01 RX ADMIN — DICYCLOMINE HYDROCHLORIDE 20 MG: 10 CAPSULE ORAL at 03:46

## 2023-02-01 RX ADMIN — ACETAMINOPHEN 1000 MG: 500 TABLET ORAL at 05:02

## 2023-02-01 RX ADMIN — ALUMINUM HYDROXIDE AND MAGNESIUM HYDROXIDE 30 ML: 200; 200 SUSPENSION ORAL at 03:46

## 2023-02-01 RX ADMIN — FAMOTIDINE 20 MG: 20 TABLET, FILM COATED ORAL at 03:46

## 2023-02-01 NOTE — ED PROVIDER NOTES
HPI   Patient is a 49-year-old female who presents with left upper quadrant abdominal pain. Has been bothering her for about a week but has acutely worsened over the last couple days. States it feels like a burning sensation worse when you touch her skin or massage the area. Also significantly worsened pain with deep inspiration. Denies any specific chest pain but does feel like his trouble breathing because of the pain. Does have a funny taste in her mouth. Pain is not affected by eating. Having regular bowel movements. Denies any urinary symptoms. Denies any fever sweats or chills. Pain also started radiating around to her back over the last couple days. She denies any history of blood clots, swelling in her lower extremities. She is on NuvaRing for birth control. Denies any hemoptysis or coughing.     Past Medical History:   Diagnosis Date    Allergic conjunctivitides     allergic conjuctivitis    Anemia NEC     Contact dermatitis and other eczema, due to unspecified cause     HS Bilat axilla    Generalized headaches 4/26/2010    Ill-defined condition     Hydrodenitis    Seasonal allergic rhinitis     Sleep apnea     does not use cpap       Past Surgical History:   Procedure Laterality Date    HX GYN      vaginal delivery x 2    HX OTHER SURGICAL  2010    hydrodenitis removed from arm pit and drained    HX WRIST FRACTURE TX      [place] LAP,CHOLECYSTECTOMY N/A 10/06/2016    Dr. Saman Vargas         Family History:   Problem Relation Age of Onset    Diabetes Mother     Hypertension Mother     Heart Disease Mother     Stroke Father     Cancer Maternal Aunt         breast    Breast Cancer Maternal Aunt     Diabetes Maternal Grandmother     Breast Cancer Maternal Grandmother        Social History     Socioeconomic History    Marital status:      Spouse name: Not on file    Number of children: Not on file    Years of education: Not on file    Highest education level: Not on file   Occupational History    Not on file   Tobacco Use    Smoking status: Former    Smokeless tobacco: Never   Substance and Sexual Activity    Alcohol use: No    Drug use: No    Sexual activity: Yes     Partners: Male     Birth control/protection: None, Inserts   Other Topics Concern    Not on file   Social History Narrative    Not on file     Social Determinants of Health     Financial Resource Strain: Not on file   Food Insecurity: Not on file   Transportation Needs: Not on file   Physical Activity: Not on file   Stress: Not on file   Social Connections: Not on file   Intimate Partner Violence: Not on file   Housing Stability: Not on file         ALLERGIES: Zithromax [azithromycin], Percocet [oxycodone-acetaminophen], and Norco [hydrocodone-acetaminophen]    Review of Systems  Constitutional: No fever  Respiratory: No SOB  Cardio: No chest pain  GI: No blood in stool  : No hematuria  MSK: Positive for back pain  Skin: No rashes  Neuro: No headache    Vitals:    02/01/23 0325   BP: (!) 142/82   Pulse: 88   Resp: 18   Temp: 97.8 °F (36.6 °C)   SpO2: 100%   Weight: 122.5 kg (270 lb)   Height: 5' 5\" (1.651 m)            Physical Exam   General: No acute distress  Head: Normocephalic, atraumatic  Psych: Cooperative and alert  Eyes: No scleral icterus, normal conjunctiva  ENT: Moist oral mucosa  Neck: Supple  CV: Regular rate and rhythm, no pitting edema, palpable radial pulses  Pulm: Clear breath sounds bilaterally without any wheezing or rhonchi, normal respiratory rate  GI: Normal bowel sounds, soft, non-tender  MSK: Moves all four extremities  Skin: No rashes, a lot of freckles over the area of discomfort  Neuro: Alert and conversive    Medical Decision Making  Amount and/or Complexity of Data Reviewed  Labs: ordered. Radiology: ordered. ECG/medicine tests: ordered. Risk  OTC drugs. Prescription drug management. Patient is a 49-year-old female who presents with left upper quadrant abdominal pain.   Symptoms are atypical.  Especially because she is a lot of tenderness with touching the skin almost like a hypersensitivity reaction. We will evaluate for abdominal etiologies however I would also like to evaluate for lower lung etiology such as pneumonia or PE. Is low risk therefore we will send off a dimer. Imaging pending dimer results. Also evaluate for cardiac etiology such as ACS. Patient was treated with a GI cocktail to start to see if this helps. EKG shows a normal sinus rhythm at a rate of 79 bpm.  QRS is narrow, axis is normal, R wave progression is pericardium is satisfactory. No specific ST elevation or depression noted. Overall shows no signs of ACS or arrhythmia. Chest x-ray shows no acute cardiopulmonary process per my interpretation. CBC, INR, CMP, troponin are within normal limits. D-dimer is 0.59 and per years criteria can exclude PE in this low risk patient. UA is a contaminated sample but overall no signs of infection or other acute process. Upon reexamination patient states she is not feeling any better after the GI cocktail. Because of this she will be treated with IV Toradol, Tylenol and lidocaine patch. Upon examination continues to appear comfortable is otherwise hemodynamically stable. Discussed with her different etiologies that could be contributing to her symptoms. Discussed importance of following up with GI. Patient stable for discharge at this time. Patient is in agreement with the plan to be discharged at this time. All the patient's questions were answered. Patient was given written instructions on the diagnosis, and states understanding of the plan moving forward. We did discuss important signs and symptoms that should prompt quick return to the emergency department. Disposition: Patient was discharged home in stable condition.   They will follow up with GI    Prescriptions: Protonix    Diagnosis: Acute abdominal pain, no diagnosis           Procedures

## 2023-02-01 NOTE — ED TRIAGE NOTES
Pt came in ambulatory c/o constant right flank/back pain coming around to abd pain tender to touch that woke her from sleep. Pt states she has been taking ibuprofen with minimal relief. Pain worse when taking deep breaths.  BRADY MADDEN, MD at bedside for eval.

## 2023-02-01 NOTE — ED NOTES
I have reviewed discharge instruction and prescriptions with patient. Patient verbalized understanding and has no further questions at this time. Education taught and patient verbalized understanding of education. Teach back method used. 20g IV removed, catheter tip intact on removal.  Armband removed and shredded per patients request.    Patient discharged ambulatory with spouse to home.

## 2023-02-08 ENCOUNTER — OFFICE VISIT (OUTPATIENT)
Dept: ORTHOPEDIC SURGERY | Age: 38
End: 2023-02-08
Payer: COMMERCIAL

## 2023-02-08 VITALS — BODY MASS INDEX: 46.15 KG/M2 | TEMPERATURE: 97.7 F | HEIGHT: 65 IN | WEIGHT: 277 LBS

## 2023-02-08 DIAGNOSIS — M65.4 DE QUERVAIN'S TENOSYNOVITIS, RIGHT: Primary | ICD-10-CM

## 2023-02-08 DIAGNOSIS — Z98.890 STATUS POST DE QUERVAIN'S RELEASE SURGERY: ICD-10-CM

## 2023-02-08 DIAGNOSIS — L91.0 KELOID SCAR OF SKIN: ICD-10-CM

## 2023-02-08 NOTE — PROGRESS NOTES
Rylee Hoyos is a 40 y.o. female right handed unspecified employment. Worker's Compensation and legal considerations: none filed. Vitals:    02/08/23 1006   Temp: 97.7 °F (36.5 °C)   TempSrc: Temporal   Weight: 277 lb (125.6 kg)   Height: 5' 5\" (1.651 m)   PainSc:   5   PainLoc: Wrist           Chief Complaint   Patient presents with    Wrist Pain     Right wrist pain     HPI: Patient presents today 2-1/2 months status post right first dorsal compartment release. She has been in therapy. She reports continued pain as well as thickening of her scar. 12/7/2022 HPI: Patient presents today 2 weeks status post right first dorsal compartment release. She reports continued pain at the wrist with a feeling of popping.    9/23/2022 HPI: Patient presents today for follow-up after right first dorsal compartment injection. Initially she had her symptoms relieved and continue to wear the brace however over the past couple weeks her pain has come back significantly. 2 weeks HPI: Patient presents 2 weeks status post right carpal tunnel release. She reports minimal pain around the hand and reports the numbness to be completely resolved. Preop HPI: Patient returns today for bilateral upper extremity EMG follow-up. She received bilateral carpal tunnel injections that significantly helped. However she is having some pain in the left side after having the nerve study specifically about the thumb wrist and distal forearm. Initial HPI: Patient presents today with complaints right wrist pain numbness and tingling with occasional left-sided numbness and tingling. Date of onset: Indeterminate    Injury: No    Prior Treatment:  Yes: Comment: Right carpal tunnel release. Right first dorsal release.   Bilateral carpal tunnel injections    Numbness/ Tingling: Yes: Comment: Right much worse than left      ROS: Review of Systems - General ROS: negative  Psychological ROS: negative  ENT ROS: negative  Allergy and Immunology ROS: negative  Hematological and Lymphatic ROS: negative  Respiratory ROS: no cough, shortness of breath, or wheezing  Cardiovascular ROS: no chest pain or dyspnea on exertion  Gastrointestinal ROS: no abdominal pain, change in bowel habits, or black or bloody stools  Musculoskeletal ROS: positive for - pain in arm - right  Neurological ROS: Negative  Dermatological ROS: negative    Past Medical History:   Diagnosis Date    Allergic conjunctivitides     allergic conjuctivitis    Anemia NEC     Contact dermatitis and other eczema, due to unspecified cause     HS Bilat axilla    Generalized headaches 4/26/2010    Ill-defined condition     Hydrodenitis    Seasonal allergic rhinitis     Sleep apnea     does not use cpap       Past Surgical History:   Procedure Laterality Date    HX GYN      vaginal delivery x 2    HX OTHER SURGICAL  2010    hydrodenitis removed from arm pit and drained    HX WRIST FRACTURE TX      [place] LAPAROSCOPY SURG CHOLECYSTECTOMY N/A 10/06/2016    Dr. Telly Partida       Current Outpatient Medications   Medication Sig Dispense Refill    pantoprazole (Protonix) 40 mg tablet Take 1 Tablet by mouth daily for 30 days. 30 Tablet 0    semaglutide (OZEMPIC SC) by SubCUTAneous route. montelukast (SINGULAIR) 10 mg tablet Take 10 mg by mouth daily. NUVARING 0.12-0.015 mg/24 hr vaginal ring INSERT 1 RING VAGINALLY FOR 3 WEEKS THEN REMOVE FOR 1 WEEK AND INSERT NEW RING 1 Device 12       Allergies   Allergen Reactions    Zithromax [Azithromycin] Rash    Percocet [Oxycodone-Acetaminophen] Nausea Only    Norco [Hydrocodone-Acetaminophen] Rash           PE:     Physical Exam  Vitals and nursing note reviewed. Constitutional:       General: She is not in acute distress. Appearance: Normal appearance. She is not ill-appearing. Cardiovascular:      Pulses: Normal pulses. Pulmonary:      Effort: Pulmonary effort is normal. No respiratory distress.    Musculoskeletal:         General: Swelling and tenderness present. No deformity or signs of injury. Cervical back: Normal range of motion and neck supple. Right lower leg: No edema. Left lower leg: No edema. Skin:     General: Skin is warm and dry. Capillary Refill: Capillary refill takes less than 2 seconds. Findings: No bruising or erythema. Neurological:      General: No focal deficit present. Mental Status: She is alert and oriented to person, place, and time. Cranial Nerves: No cranial nerve deficit. Sensory: No sensory deficit. Psychiatric:         Mood and Affect: Mood normal.         Behavior: Behavior normal.        Right wrist: Incision healed with hypertrophy around scar. Tenderness to palpation at the incision. Mild edema noted deep to the incision. Grossly neurovascularly intact and range of motion full. NCV & EMG Findings:  Evaluation of the left median motor nerve showed decreased conduction velocity (Elbow-Wrist, 49 m/s). The right median motor nerve showed prolonged distal onset latency (5.2 ms). The left median sensory and the right median sensory nerves showed prolonged distal peak latency (L3.7, R4.3 ms) and decreased conduction velocity (Wrist-2nd Digit, L38, R33 m/s). All remaining nerves (as indicated in the following tables) were within normal limits. Left vs. Right side comparison data for the median motor nerve indicates abnormal L-R latency difference (1.2 ms). The median sensory nerve indicates abnormal L-R latency difference (0.6 ms). The ulnar sensory nerve indicates abnormal L-R latency difference (0.6 ms). All remaining left vs. right side differences were within normal limits. All examined muscles (as indicated in the following table) showed no evidence of electrical instability. INTERPRETATION     This is an abnormal electrodiagnostic examination. These findings may be consistent with:   1.  Moderate median mononeuropathy at the right wrist (carpal tunnel syndrome)   2. Minimal median mononeuropathy at the left wrist (carpal tunnel syndrome)     There is no electrodiagnostic evidence of any cervical radiculopathy, brachial plexopathy, peripheral polyneuropathy, or any other mononeuropathy. CLINICAL INTERPRETATION  Her electrodiagnostic findings of carpal tunnel syndrome bilaterally are consistent with her hand symptoms      Imaging:     None indicated        ICD-10-CM ICD-9-CM    1. De Quervain's tenosynovitis, right  M65.4 727.04 INJECT TENDON SHEATH/LIGAMENT      triamcinolone acetonide (KENALOG) 10 mg/mL injection 5 mg      2. Keloid scar of skin  L91.0 701.4       3. Status post de Quervain's release surgery  Z98.890 V45.89 INJECT TENDON SHEATH/LIGAMENT      triamcinolone acetonide (KENALOG) 10 mg/mL injection 5 mg            Plan:     Right first dorsal compartment/subcutaneous injection. Continue OT as directed by therapist.      Follow-up and Dispositions    Return if symptoms worsen or fail to improve. Plan was reviewed with patient, who verbalized agreement and understanding of the plan    Laura 36 NOTE        Chart reviewed for the following:   German JACOBS DO, have reviewed the History, Physical and updated the Allergic reactions for [id number] performed immediately prior to start of procedure:   Marcelo JACOBS DO, have performed the following reviews on Marsha How prior to the start of the procedure:            * Patient was identified by name and date of birth   * Agreement on procedure being performed was verified  * Risks and Benefits explained to the patient  * Procedure site verified and marked as necessary  * Patient was positioned for comfort  * Consent was signed and verified     Time: 10:30 AM      Date of procedure: 2/8/2023    Procedure performed by:   Marcelo Ramirez DO    Provider assisted by: Yee Singer MA    Patient assisted by: self    How tolerated by patient: tolerated the procedure well with no complications    Post Procedural Pain Scale: 0 - No Hurt    Comments: none    Procedure:  After consent was obtained, using sterile technique the right wrist was prepped. Local anesthetic used: 1% lidocaine. Kenalog 5 mg and was then injected and the needle withdrawn. The procedure was well tolerated. The patient is asked to continue to rest the area for a few more days before resuming regular activities. It may be more painful for the first 1-2 days. Watch for fever, or increased swelling or persistent pain in the joint. Call or return to clinic prn if such symptoms occur or there is failure to improve as anticipated.

## 2023-02-17 ENCOUNTER — HOSPITAL ENCOUNTER (OUTPATIENT)
Facility: HOSPITAL | Age: 38
Setting detail: RECURRING SERIES
Discharge: HOME | End: 2023-02-20
Payer: COMMERCIAL

## 2023-02-17 PROCEDURE — 97110 THERAPEUTIC EXERCISES: CPT

## 2023-02-17 NOTE — THERAPY RECERTIFICATION
OCCUPATIONAL THERAPY - DAILY TREATMENT NOTE    Patient Name: Mauricio Capellan    Date: 2023    : 1985  Insurance: Payor: /      Patient  verified Yes     Visit #   Current / Total 2 4   Time   In / Out 310 330   Pain   In / Out 310 330   Subjective Functional Status/Changes: I just want to be able to open a door and feel normal again    Shortened session due to late arrival by patient     Changes to:  Meds, Allergies, Med Hx, Sx Hx? If yes, update Summary List no       TREATMENT AREA =  Radial styloid tenosynovitis (de quervain) [M65.4]    OBJECTIVE      Therapeutic Procedures:   Tx Min Billable or 1:1 Min (if diff from Tx Min) Procedure, Rationale, Specifics    20 [id number] Therapeutic Exercise (timed):  increase ROM, strength, coordination, balance, and proprioception to grasp (see flow sheet as applicable)    Recheck for PN  Thumb/Wrist ROM                                  20  MC BC Totals Reminder: bill using total billable min of TIMED therapeutic procedures (example: do not include dry needle or estim unattended, both untimed codes, in totals to left)  8-22 min = 1 unit; 23-37 min = 2 units; 38-52 min = 3 units; 53-67 min = 4 units; 68-82 min = 5 units   Total Total         Billed concurrently with other treatments Patient Education:  Reviewed HEP     Other Objective/Functional Measures:    Range of Motion:     THUMB ROM CHART as measured in degrees  Thumb, Side  Active/Passive Date:  12/15/2022  Right 2023  Right    MP 14-42 5-60 0-62   IP 0-52 45 53   Radial Abd. 44      Palmar Abd. 55      Opposition WNL            Elbow/Wrist   Wrist 12/15/2022  right  2023  Right    Right   Flex 26 48  74    Ext 43 62   65   UD 17       RD 12       FA          Pro         Sup 70  73 80       Strength:   Measurements: Taken with Dawson Dynamometer, in Lbs   Level 2 2023   Right 48  53   Left 57     Deficit       Change             Pinch Measurements: Taken with Pinch Gauge, in Lbs   (hand) 1/12/2023 2/17   Lateral       Right 12 18   Left  18    Deficit      Change      Pad      Right 4 12   Left 8    Deficit      Change      Ruddy      Right 6 13   Left 12    Deficit      Change                    ASSESSMENT  Assessment/Changes in Function:     Improved ROM, improved strength, unable to recheck FOTO due to time constraints      [x]  See Progress Note/Recertification   Patient will continue to benefit from skilled OT services to modify and progress therapeutic interventions, analyze and address ROM deficits, analyze and address strength deficits, and instruct in home and community integration  to attain remaining goals. Progress toward goals / Updated goals:     Goal:* Patient will have 60 degrees of right wrist flexion in order to perform hygiene tasks and /or work with tools such as a screwdriver. Status at PN 1/12/2023 - 48 deg  Current Status (2/17/2023): 70- goal Met      Goal:* Patient will have 80 degrees of right forearm supination in order to perform ADL's including washing face and accepting change. Status at PN 1/12/2023 - 73 deg  Current Status (2/17/2023): 80, Goal Met       Goal:* Pt will have 55 pounds of  in the right hand to allow for functional grasp for all ADL activities including dressing, bathing and self care. Status at PN 1/12/2023 - 48#  Current Status (2/17/2023): 53#       Goal:* Patient will have improved right lateral pinch strength of  15 pounds in order to perform fine motor tasks such as turning pages, turning ignition and open containers. Status at PN 1/12/2023 - 12#  Current Status (2/17/2023):18, Goal Met    Goal:* Patient will have improved right tip pinch strength of 6 pounds in order to perform fine motor tasks such as zippiing up zippers or opening containers.   Status at PN 1/12/2023- 4#  Current Status (2/17/2023): 12- goal met       Goal:* Patient will have improved right ruddy pinch strength of 10 pounds in order to perform fine motor tasks such as writing. Status at PN 1/12/2023 : 6#  Current Status (2/17/2023): 13#, Goal Met    Goal:* Patient will show a 26 point improvement on FOTO functional status measure to improve overall functional performance. Status at Eval:33   Status at PN 1/12/2023 - 42 (+9)  Current Status (2/17/2023):  Not tested due to time constraints         PLAN  [x]  Upgrade activities as tolerated    []  Discharge due to :    []  Other:      Therapist: CECILY Damon COTA/L     Date: 2/17/2023 Time: 10:56 AM     Future Appointments   Date Time Provider Bethany Contreras   2/17/2023  3:00 PM CECILY Tellez [facility]

## 2023-02-20 NOTE — PROGRESS NOTES
[facility] - [place]  [address] [facility]  [phone] fax    Medicare Progress Report    Patient name: Diana Officer Start of Care: 12/15/2022   Referral source: Brennan Gomes DO : 1985   Medical/Treatment Diagnosis: Radial styloid tenosynovitis (de quervain) [M65.4]  Payor: BLUE CROSS / Plan: [place] [employer]ERAL / Product Type: PPO /  Onset Date:sx 22      Prior Hospitalization: see medical history Provider#: [id number]   Medications: Verified on Patient Summary List      Comorbidities: Right CTR   Prior Level of Function: (I) with ADL/IADL tasks without functional limitations and pain using dominant right hand     Visits from Start of Care: 6    Missed Visits: 4    Reporting Period: 23 to 23    Subjective Reports: ongoing problems with handwriting     THUMB ROM CHART as measured in degrees  Thumb, Side  Active/Passive Date:  12/15/2022  Right 2023  Right    MP 14-42 5-60 0-62   IP 0-52 45 53   Radial Abd. 44       Palmar Abd. 55       Opposition WNL             Elbow/Wrist   Wrist 12/15/2022  right  2023  Right    Right   Flex 26 48  74    Ext 43 62   65   UD 17       RD 12       FA          Pro         Sup 70  73 80       Strength:   Measurements: Taken with Dawson Dynamometer, in Lbs   Level 2 2023   Right 48  53   Left 57     Deficit       Change             Pinch Measurements: Taken with Pinch Gauge, in Lbs   (hand) 2023   Lateral        Right 12 18   Left  18     Deficit       Change       Pad       Right 4 12   Left 8     Deficit       Change       Ruddy       Right 6 13   Left 12     Deficit       Change           Goal:* Patient will have 60 degrees of right wrist flexion in order to perform hygiene tasks and /or work with tools such as a screwdriver.   Status at PN 2023 - 48 deg  Current Status (2023): 70- goal Met      Goal:* Patient will have 80 degrees of right forearm supination in order to perform ADL's including washing face and accepting change. Status at PN 1/12/2023 - 73 deg  Current Status (2/17/2023): 80, Goal Met        Goal:* Pt will have 55 pounds of  in the right hand to allow for functional grasp for all ADL activities including dressing, bathing and self care. Status at PN 1/12/2023 - 48#  Current Status (2/17/2023): 53#        Goal:* Patient will have improved right lateral pinch strength of  15 pounds in order to perform fine motor tasks such as turning pages, turning ignition and open containers. Status at PN 1/12/2023 - 12#  Current Status (2/17/2023):18, Goal Met     Goal:* Patient will have improved right tip pinch strength of 6 pounds in order to perform fine motor tasks such as zippiing up zippers or opening containers. Status at PN 1/12/2023- 4#  Current Status (2/17/2023): 12- goal met     Goal:* Patient will have improved right karen pinch strength of 10 pounds in order to perform fine motor tasks such as writing. Status at PN 1/12/2023 : 6#  Current Status (2/17/2023): 13#, Goal Met     Goal:* Patient will show a 26 point improvement on FOTO functional status measure to improve overall functional performance. Status at Eval:33   Status at PN 1/12/2023 - 42 (+9)  Current Status (2/17/2023): Not tested due to time constraints            Key functional changes: improved  and pinches, improved ROM      Problems/ barriers to goal attainment: Limited Visits       Assessment / Recommendations:Patient will benefit from continued skilled occupational therapy to increase upper extremity function and functional independence.      Problem List: decrease strength and decrease ADL/ functional abilitiies   Treatment Plan: Therapeutic exercise, Therapeutic activities, Physical agent/modality, Manual therapy, Patient education, and ADLs/IADLs    Patient Goal (s) has been updated and includes: improve functional use      Updated Goals to be accomplished in 4 weeks:       Goal:* Pt will have 55 pounds of  in the right hand to allow for functional grasp for all ADL activities including dressing, bathing and self care. Status at Last PN: 53#    Goal:* Patient will show a 26 point improvement on FOTO functional status measure to improve overall functional performance.   Status at Eval:33  Status at - 42 (+9)       Frequency / Duration: Patient to be seen 1-2 times per week for 4 weeks:        [address] CECILY Durán COTA/L  2/20/2023 9:24 AM

## 2023-03-03 ENCOUNTER — HOSPITAL ENCOUNTER (OUTPATIENT)
Facility: HOSPITAL | Age: 38
Setting detail: RECURRING SERIES
Discharge: HOME | End: 2023-03-06
Payer: COMMERCIAL

## 2023-03-03 PROCEDURE — 97032 APPL MODALITY 1+ESTIM EA 15: CPT

## 2023-03-03 PROCEDURE — 97530 THERAPEUTIC ACTIVITIES: CPT

## 2023-03-03 PROCEDURE — 97110 THERAPEUTIC EXERCISES: CPT

## 2023-04-28 ENCOUNTER — ANESTHESIA EVENT (OUTPATIENT)
Facility: HOSPITAL | Age: 38
End: 2023-04-28
Payer: COMMERCIAL

## 2023-05-01 ENCOUNTER — HOSPITAL ENCOUNTER (OUTPATIENT)
Facility: HOSPITAL | Age: 38
Setting detail: OUTPATIENT SURGERY
Discharge: HOME | End: 2023-05-01
Attending: INTERNAL MEDICINE | Admitting: INTERNAL MEDICINE
Payer: COMMERCIAL

## 2023-05-01 ENCOUNTER — ANESTHESIA (OUTPATIENT)
Facility: HOSPITAL | Age: 38
End: 2023-05-01
Payer: COMMERCIAL

## 2023-05-01 VITALS
RESPIRATION RATE: 17 BRPM | HEART RATE: 100 BPM | OXYGEN SATURATION: 100 % | DIASTOLIC BLOOD PRESSURE: 81 MMHG | SYSTOLIC BLOOD PRESSURE: 119 MMHG | BODY MASS INDEX: 45.97 KG/M2 | HEIGHT: 65 IN | WEIGHT: 275.9 LBS | TEMPERATURE: 98.2 F

## 2023-05-01 LAB — HCG UR QL: NEGATIVE

## 2023-05-01 PROCEDURE — 88305 TISSUE EXAM BY PATHOLOGIST: CPT

## 2023-05-01 PROCEDURE — 7100000011 HC PHASE II RECOVERY - ADDTL 15 MIN: Performed by: INTERNAL MEDICINE

## 2023-05-01 PROCEDURE — 7100000000 HC PACU RECOVERY - FIRST 15 MIN: Performed by: INTERNAL MEDICINE

## 2023-05-01 PROCEDURE — 00731 ANES UPR GI NDSC PX NOS: CPT | Performed by: STUDENT IN AN ORGANIZED HEALTH CARE EDUCATION/TRAINING PROGRAM

## 2023-05-01 PROCEDURE — 7100000010 HC PHASE II RECOVERY - FIRST 15 MIN: Performed by: INTERNAL MEDICINE

## 2023-05-01 PROCEDURE — 6360000002 HC RX W HCPCS: Performed by: NURSE ANESTHETIST, CERTIFIED REGISTERED

## 2023-05-01 PROCEDURE — 88342 IMHCHEM/IMCYTCHM 1ST ANTB: CPT

## 2023-05-01 PROCEDURE — 2709999900 HC NON-CHARGEABLE SUPPLY: Performed by: INTERNAL MEDICINE

## 2023-05-01 PROCEDURE — 2580000003 HC RX 258: Performed by: NURSE ANESTHETIST, CERTIFIED REGISTERED

## 2023-05-01 PROCEDURE — 3700000000 HC ANESTHESIA ATTENDED CARE: Performed by: INTERNAL MEDICINE

## 2023-05-01 PROCEDURE — 3700000001 HC ADD 15 MINUTES (ANESTHESIA): Performed by: INTERNAL MEDICINE

## 2023-05-01 PROCEDURE — 3600007502: Performed by: INTERNAL MEDICINE

## 2023-05-01 PROCEDURE — 3600007512: Performed by: INTERNAL MEDICINE

## 2023-05-01 PROCEDURE — 81025 URINE PREGNANCY TEST: CPT

## 2023-05-01 RX ORDER — LIDOCAINE HYDROCHLORIDE 10 MG/ML
1 INJECTION, SOLUTION EPIDURAL; INFILTRATION; INTRACAUDAL; PERINEURAL
Status: DISCONTINUED | OUTPATIENT
Start: 2023-05-01 | End: 2023-05-01 | Stop reason: HOSPADM

## 2023-05-01 RX ORDER — SODIUM CHLORIDE 0.9 % (FLUSH) 0.9 %
5-40 SYRINGE (ML) INJECTION PRN
Status: DISCONTINUED | OUTPATIENT
Start: 2023-05-01 | End: 2023-05-01 | Stop reason: HOSPADM

## 2023-05-01 RX ORDER — FAMOTIDINE 20 MG/1
20 TABLET, FILM COATED ORAL ONCE
Status: DISCONTINUED | OUTPATIENT
Start: 2023-05-01 | End: 2023-05-01

## 2023-05-01 RX ORDER — PROPOFOL 10 MG/ML
INJECTION, EMULSION INTRAVENOUS PRN
Status: DISCONTINUED | OUTPATIENT
Start: 2023-05-01 | End: 2023-05-01 | Stop reason: SDUPTHER

## 2023-05-01 RX ORDER — SODIUM CHLORIDE 9 MG/ML
INJECTION, SOLUTION INTRAVENOUS PRN
Status: DISCONTINUED | OUTPATIENT
Start: 2023-05-01 | End: 2023-05-01 | Stop reason: HOSPADM

## 2023-05-01 RX ORDER — SODIUM CHLORIDE 0.9 % (FLUSH) 0.9 %
5-40 SYRINGE (ML) INJECTION EVERY 12 HOURS SCHEDULED
Status: DISCONTINUED | OUTPATIENT
Start: 2023-05-01 | End: 2023-05-01 | Stop reason: HOSPADM

## 2023-05-01 RX ADMIN — PROPOFOL 100 MG: 10 INJECTION, EMULSION INTRAVENOUS at 07:42

## 2023-05-01 RX ADMIN — PROPOFOL 100 MG: 10 INJECTION, EMULSION INTRAVENOUS at 07:39

## 2023-05-01 RX ADMIN — SODIUM CHLORIDE 250 ML/HR: 9 INJECTION, SOLUTION INTRAVENOUS at 07:27

## 2023-05-01 RX ADMIN — PROPOFOL 100 MG: 10 INJECTION, EMULSION INTRAVENOUS at 07:44

## 2023-05-01 ASSESSMENT — PAIN - FUNCTIONAL ASSESSMENT: PAIN_FUNCTIONAL_ASSESSMENT: 0-10

## 2023-05-01 ASSESSMENT — PAIN DESCRIPTION - DESCRIPTORS: DESCRIPTORS: CRAMPING

## 2023-05-01 NOTE — H&P
History and Physical reviewed; I have examined the patient and there are no pertinent changes. Niko Soto MD, MD   7:20 AM 5/1/2023  [facility], [address] [place] [address]  [email or web address]

## 2023-05-01 NOTE — DISCHARGE INSTRUCTIONS
Upper GI Endoscopy: What to Expect at [address] Karen had an upper GI endoscopy. Your doctor used a thin, lighted tube that bends to look at the inside of your esophagus, your stomach, and the first part of the small intestine, called the duodenum. After you have an endoscopy, you will stay at the hospital or clinic for 1 to 2 hours. This will allow the medicine to wear off. You will be able to go home after your doctor or nurse checks to make sure that you're not having any problems. You may have to stay overnight if you had treatment during the test. You may have a sore throat for a day or two after the test.  This care sheet gives you a general idea about what to expect after the test.  How can you care for yourself at home? Activity   Rest as much as you need to after you go home. You should be able to go back to your usual activities the day after the test.  Diet   Follow your doctor's directions for eating after the test.  Drink plenty of fluids (unless your doctor has told you not to). Medications   If you have a sore throat the day after the test, use an over-the-counter spray to numb your throat. Follow-up care is a key part of your treatment and safety. Be sure to make and go to all appointments, and call your doctor if you are having problems. It's also a good idea to know your test results and keep a list of the medicines you take. When should you call for help? Call 911 anytime you think you may need emergency care. For example, call if:    You passed out (lost consciousness). You have trouble breathing. You pass maroon or bloody stools. Call your doctor now or seek immediate medical care if:    You have pain that does not get better after your take pain medicine. You have new or worse belly pain. You have blood in your stools. You are sick to your stomach and cannot keep fluids down. You have a fever. You cannot pass stools or gas.    Watch closely

## 2023-05-01 NOTE — ANESTHESIA PRE PROCEDURE
Department of Anesthesiology  Preprocedure Note       Name:  Brinda Leon   Age:  40 y.o.  :  1985                                          MRN:  [id number]         Date:  2023      Surgeon: Chinyere Vogel): Cam Dejesus MD    Procedure: Procedure(s):  EGD ESOPHAGOGASTRODUODENOSCOPY    Medications prior to admission:   Prior to Admission medications    Medication Sig Start Date End Date Taking? Authorizing Provider   etonogestrel-ethinyl estradiol (NUVARING) 0.12-0.015 MG/24HR vaginal ring INSERT 1 RING VAGINALLY FOR 3 WEEKS THEN REMOVE FOR 1 WEEK AND INSERT NEW RING 3/12/19   Ar Automatic Reconciliation   montelukast (SINGULAIR) 10 MG tablet Take 10 mg by mouth daily    Ar Automatic Reconciliation   pantoprazole (PROTONIX) 40 MG tablet Take 40 mg by mouth daily 2/1/23 3/3/23  Ar Automatic Reconciliation       Current medications:    Current Facility-Administered Medications   Medication Dose Route Frequency Provider Last Rate Last Admin    lidocaine PF 1 % injection 1 mL  1 mL IntraDERmal Once PRN Ekaterina People, APRN - CRNA        famotidine (PEPCID) tablet 20 mg  20 mg Oral Once Ekaterina People, APRN - CRNA        sodium chloride flush 0.9 % injection 5-40 mL  5-40 mL IntraVENous 2 times per day Ekaterina People, APRN - CRNA        sodium chloride flush 0.9 % injection 5-40 mL  5-40 mL IntraVENous PRN Ekaterina People, APRN - CRNA        0.9 % sodium chloride infusion   IntraVENous PRN Ekaterina People, APRN - CRNA           Allergies:     Allergies   Allergen Reactions    Azithromycin Rash    Oxycodone-Acetaminophen Nausea Only    Hydrocodone-Acetaminophen Rash       Problem List:    Patient Active Problem List   Diagnosis Code    Generalized headaches R51.9    Seasonal allergic rhinitis J30.2    Obesity, morbid ([address] E66.01       Past Medical History:        Diagnosis Date    Contact dermatitis and other eczema, due to unspecified cause     HS Bilat axilla    Generalized headaches 2010   

## 2023-05-01 NOTE — ANESTHESIA POSTPROCEDURE EVALUATION
Department of Anesthesiology  Postprocedure Note    Patient: Laura Barnard  MRN: [id number]  YOB: 1985  Date of evaluation: 5/1/2023      Procedure Summary     Date: 05/01/23 Room / Location: [facility] ENDO 03 / [facility] ENDOSCOPY    Anesthesia Start: 0733 Anesthesia Stop: 0753    Procedure: EGD ESOPHAGOGASTRODUODENOSCOPY w/Biopsies (Upper GI Region) Diagnosis:       Epigastric pain      Epigastric abdominal tenderness, rebound tenderness presence not specified      Nausea      Abnormal feces      BMI 45.0-49.9, adult (HCC)      Acute diarrhea      (Epigastric pain [R10.13])      (Epigastric abdominal tenderness, rebound tenderness presence not specified [R10.816])      (Nausea [R11.0])      (Abnormal feces [R19.5])      (BMI 45.0-49.9, adult (HCC) [Z68.42])      (Acute diarrhea [R19.7])    Surgeons: Kirit Dean MD Responsible Provider: Roger Rico MD    Anesthesia Type: MAC ASA Status: 3          Anesthesia Type: MAC    Michael Phase I: Michael Score: 10    Michael Phase II: Michael Score: 10      Anesthesia Post Evaluation    Patient location during evaluation: PACU  Patient participation: complete - patient participated  Level of consciousness: sleepy but conscious  Pain score: 0  Airway patency: patent  Nausea & Vomiting: no nausea and no vomiting  Complications: no  Cardiovascular status: blood pressure returned to baseline  Respiratory status: acceptable  Hydration status: euvolemic

## 2023-05-01 NOTE — BRIEF OP NOTE
[address] [facility]  [facility], [address]      Brief Procedure Note    Beverly Escobar  1985  [id number]    Date of Procedure: 5/1/2023     Preoperative diagnosis: Epigastric pain [R10.13]  Epigastric abdominal tenderness, rebound tenderness presence not specified [R10.816]  Nausea [R11.0]  Abnormal feces [R19.5]  BMI 45.0-49.9, adult ([address] [Z68.42]  Acute diarrhea [R19.7]    Postoperative diagnosis: Epigastric pain [R10.13]  Epigastric abdominal tenderness, rebound tenderness presence not specified [R10.816]  Nausea [R11.0]  Abnormal feces [R19.5]  BMI 45.0-49.9, adult ([address] [Z68.42]  Acute diarrhea [R19.7]    Type of Anesthesia: MAC (Monitored anesthesia care)    Description of findings: same as post op dx    Procedure: Procedure(s):  EGD ESOPHAGOGASTRODUODENOSCOPY w/Biopsies    :  Dr. Kimber Gonzales MD    Assistant(s): Circulator: Jose C Betancur RN; Casa Saul RN    Devices/implants/grafts/tissues/prosthesis: None    EBL:None    Specimens:   ID Type Source Tests Collected by Time Destination   A : Gastric Bx's: Antrum & Body Tissue Gastric SURGICAL PATHOLOGY Kimber Gonzales MD 5/1/2023 4156        Findings: See printed and scanned procedure note    Complications: None    Dr. Kimber Gonzales MD  5/1/2023  8:33 AM

## 2023-08-15 RX ORDER — SEMAGLUTIDE 0.68 MG/ML
0.25 INJECTION, SOLUTION SUBCUTANEOUS
COMMUNITY
Start: 2023-07-26

## 2023-08-15 RX ORDER — PANTOPRAZOLE SODIUM 40 MG/1
40 TABLET, DELAYED RELEASE ORAL DAILY
COMMUNITY

## 2023-08-15 NOTE — PROGRESS NOTES
Instructions for your procedure at [facility] Date: 8/15/2023      Patient's Name: Denise Arboleda      Procedure Date: 8/29        Please enter the main entrance of the hospital and check-in at the  located in the lobby. Do NOT eat or drink anything, including candy, gum, or ice chips after midnight prior to your procedure, unless it is part of your prep. Brush your teeth before coming to the hospital.You may swish with water, but do not swallow. No smoking/Vaping/E-Cigarettes 24 hours prior to the day of procedure. No alcohol 24 hours prior to the day of procedure. No recreational drugs for one week prior to the day of procedure. Bring Photo ID, Insurance information, and Co-pay if required on day of procedure. Bring in pertinent legal documents, such as, Medical Power of MELVIN HERRERA, DNR, Advance Directive, etc.  Leave all other valuables, including money/purse, at home. Remove jewelry, including ALL body piercings, nail polish, acrylic nails, and makeup (including mascara); no lotions, powders, deodorant, and/or perfume/cologne/after shave on the skin. Glasses and dentures may be worn to the hospital.  They must be removed prior to procedure. Please bring case/container for glasses or dentures. 11. Contacts should not be worn on day of procedure. 12. Call the office ([phone]) if you have symptoms of a cold or illness within 24-48 hours prior to your procedure. 13. AN ADULT (relative or friend 18 years or older) MUST DRIVE YOU HOME AFTER YOUR PROCEDURE. 14. Please make arrangements for a responsible adult (18 years or older) to be with you for 24 hours after your procedure. 13. ONE VISITOR will be allowed in the waiting area during your procedure. Special Instructions:      Bring list of CURRENT medications.   Follow instructions from the office regarding Bowel Prep, Vitamins, Iron, Blood Thinners, Insulin, Seizure, and Blood

## 2023-08-28 ENCOUNTER — ANESTHESIA EVENT (OUTPATIENT)
Facility: HOSPITAL | Age: 38
End: 2023-08-28
Payer: COMMERCIAL

## 2023-08-29 ENCOUNTER — ANESTHESIA (OUTPATIENT)
Facility: HOSPITAL | Age: 38
End: 2023-08-29
Payer: COMMERCIAL

## 2023-08-29 ENCOUNTER — HOSPITAL ENCOUNTER (OUTPATIENT)
Facility: HOSPITAL | Age: 38
Setting detail: OUTPATIENT SURGERY
Discharge: HOME | End: 2023-08-29
Attending: STUDENT IN AN ORGANIZED HEALTH CARE EDUCATION/TRAINING PROGRAM | Admitting: STUDENT IN AN ORGANIZED HEALTH CARE EDUCATION/TRAINING PROGRAM
Payer: COMMERCIAL

## 2023-08-29 VITALS
SYSTOLIC BLOOD PRESSURE: 123 MMHG | RESPIRATION RATE: 12 BRPM | WEIGHT: 284.5 LBS | HEIGHT: 65 IN | DIASTOLIC BLOOD PRESSURE: 88 MMHG | BODY MASS INDEX: 47.4 KG/M2 | TEMPERATURE: 97.9 F | HEART RATE: 77 BPM | OXYGEN SATURATION: 99 %

## 2023-08-29 LAB
GLUCOSE BLD STRIP.AUTO-MCNC: 78 MG/DL (ref 70–110)
GLUCOSE BLD STRIP.AUTO-MCNC: 98 MG/DL (ref 70–110)
HCG UR QL: NEGATIVE

## 2023-08-29 PROCEDURE — 3700000001 HC ADD 15 MINUTES (ANESTHESIA): Performed by: STUDENT IN AN ORGANIZED HEALTH CARE EDUCATION/TRAINING PROGRAM

## 2023-08-29 PROCEDURE — 88305 TISSUE EXAM BY PATHOLOGIST: CPT

## 2023-08-29 PROCEDURE — 7100000010 HC PHASE II RECOVERY - FIRST 15 MIN: Performed by: STUDENT IN AN ORGANIZED HEALTH CARE EDUCATION/TRAINING PROGRAM

## 2023-08-29 PROCEDURE — 3600007502: Performed by: STUDENT IN AN ORGANIZED HEALTH CARE EDUCATION/TRAINING PROGRAM

## 2023-08-29 PROCEDURE — 81025 URINE PREGNANCY TEST: CPT

## 2023-08-29 PROCEDURE — 2500000003 HC RX 250 WO HCPCS: Performed by: NURSE ANESTHETIST, CERTIFIED REGISTERED

## 2023-08-29 PROCEDURE — 3700000000 HC ANESTHESIA ATTENDED CARE: Performed by: STUDENT IN AN ORGANIZED HEALTH CARE EDUCATION/TRAINING PROGRAM

## 2023-08-29 PROCEDURE — 6360000002 HC RX W HCPCS: Performed by: NURSE ANESTHETIST, CERTIFIED REGISTERED

## 2023-08-29 PROCEDURE — 2709999900 HC NON-CHARGEABLE SUPPLY: Performed by: STUDENT IN AN ORGANIZED HEALTH CARE EDUCATION/TRAINING PROGRAM

## 2023-08-29 PROCEDURE — 6370000000 HC RX 637 (ALT 250 FOR IP): Performed by: NURSE ANESTHETIST, CERTIFIED REGISTERED

## 2023-08-29 PROCEDURE — 82962 GLUCOSE BLOOD TEST: CPT

## 2023-08-29 PROCEDURE — 2580000003 HC RX 258: Performed by: NURSE ANESTHETIST, CERTIFIED REGISTERED

## 2023-08-29 PROCEDURE — 3600007512: Performed by: STUDENT IN AN ORGANIZED HEALTH CARE EDUCATION/TRAINING PROGRAM

## 2023-08-29 PROCEDURE — 7100000000 HC PACU RECOVERY - FIRST 15 MIN: Performed by: STUDENT IN AN ORGANIZED HEALTH CARE EDUCATION/TRAINING PROGRAM

## 2023-08-29 RX ORDER — LIDOCAINE HYDROCHLORIDE 10 MG/ML
1 INJECTION, SOLUTION EPIDURAL; INFILTRATION; INTRACAUDAL; PERINEURAL
Status: DISCONTINUED | OUTPATIENT
Start: 2023-08-29 | End: 2023-08-29 | Stop reason: HOSPADM

## 2023-08-29 RX ORDER — FAMOTIDINE 20 MG/1
20 TABLET, FILM COATED ORAL ONCE
Status: COMPLETED | OUTPATIENT
Start: 2023-08-29 | End: 2023-08-29

## 2023-08-29 RX ORDER — SODIUM CHLORIDE, SODIUM LACTATE, POTASSIUM CHLORIDE, CALCIUM CHLORIDE 600; 310; 30; 20 MG/100ML; MG/100ML; MG/100ML; MG/100ML
INJECTION, SOLUTION INTRAVENOUS CONTINUOUS
Status: DISCONTINUED | OUTPATIENT
Start: 2023-08-29 | End: 2023-08-29 | Stop reason: HOSPADM

## 2023-08-29 RX ORDER — PROPOFOL 10 MG/ML
INJECTION, EMULSION INTRAVENOUS PRN
Status: DISCONTINUED | OUTPATIENT
Start: 2023-08-29 | End: 2023-08-29 | Stop reason: SDUPTHER

## 2023-08-29 RX ADMIN — PROPOFOL 50 MG: 10 INJECTION, EMULSION INTRAVENOUS at 11:02

## 2023-08-29 RX ADMIN — LIDOCAINE HYDROCHLORIDE 80 MG: 20 INJECTION, SOLUTION EPIDURAL; INFILTRATION; INTRACAUDAL; PERINEURAL at 10:58

## 2023-08-29 RX ADMIN — PROPOFOL 150 MG: 10 INJECTION, EMULSION INTRAVENOUS at 10:59

## 2023-08-29 RX ADMIN — PROPOFOL 50 MG: 10 INJECTION, EMULSION INTRAVENOUS at 11:05

## 2023-08-29 RX ADMIN — PROPOFOL 20 MG: 10 INJECTION, EMULSION INTRAVENOUS at 11:09

## 2023-08-29 RX ADMIN — FAMOTIDINE 20 MG: 20 TABLET ORAL at 10:42

## 2023-08-29 RX ADMIN — PROPOFOL 20 MG: 10 INJECTION, EMULSION INTRAVENOUS at 11:07

## 2023-08-29 RX ADMIN — SODIUM CHLORIDE, POTASSIUM CHLORIDE, SODIUM LACTATE AND CALCIUM CHLORIDE: 600; 310; 30; 20 INJECTION, SOLUTION INTRAVENOUS at 10:54

## 2023-08-29 RX ADMIN — PROPOFOL 30 MG: 10 INJECTION, EMULSION INTRAVENOUS at 11:00

## 2023-08-29 ASSESSMENT — PAIN - FUNCTIONAL ASSESSMENT: PAIN_FUNCTIONAL_ASSESSMENT: 0-10

## 2023-08-29 ASSESSMENT — PAIN SCALES - GENERAL: PAINLEVEL_OUTOF10: 0

## 2023-08-29 NOTE — ANESTHESIA POSTPROCEDURE EVALUATION
Department of Anesthesiology  Postprocedure Note    Patient: Katiana Melton  MRN: [id number]  YOB: 1985  Date of evaluation: 8/29/2023      Procedure Summary     Date: 08/29/23 Room / Location: [facility] ENDO 02 / [facility] ENDOSCOPY    Anesthesia Start: 1055 Anesthesia Stop: 1119    Procedure: COLONOSCOPY with bxs and polypectomy (Abdomen) Diagnosis:       Elevated fecal calprotectin      Chronic diarrhea      (Elevated fecal calprotectin [R19.5])      (Chronic diarrhea [K52.9])    Surgeons: Barry Rizzo MD Responsible Provider:     Anesthesia Type: MAC ASA Status: 3          Anesthesia Type: MAC    Michael Phase I: Michael Score: 10    Michael Phase II: Michael Score: 10      Anesthesia Post Evaluation    Patient location during evaluation: PACU  Patient participation: complete - patient participated  Level of consciousness: sleepy but conscious  Pain score: 0  Airway patency: patent  Nausea & Vomiting: no nausea and no vomiting  Complications: no  Cardiovascular status: blood pressure returned to baseline  Respiratory status: acceptable  Hydration status: euvolemic  Pain management: adequate

## 2023-08-29 NOTE — DISCHARGE INSTRUCTIONS
Colonoscopy: What to Expect at [address]  After a colonoscopy, you'll stay at the clinic until you wake up. Then you can go home. But you'll need to arrange for a ride. Your doctor will tell you when you can eat and do your other usual activities. Your doctor will talk to you about when you'll need your next colonoscopy. Your doctor can help you decide how often you need to be checked. This will depend on the results of your test and your risk for colorectal cancer. After the test, you may be bloated or have gas pains. You may need to pass gas. If a biopsy was done or a polyp was removed, you may have streaks of blood in your stool (feces) for a few days. Problems such as heavy rectal bleeding may not occur until several weeks after the test. This isn't common. But it can happen after polyps are removed. This care sheet gives you a general idea about how long it will take for you to recover. But each person recovers at a different pace. Follow the steps below to get better as quickly as possible. How can you care for yourself at home? Activity    Rest when you feel tired. You can do your normal activities when it feels okay to do so. Diet    Follow your doctor's directions for eating. Unless your doctor has told you not to, drink plenty of fluids. This helps to replace the fluids that were lost during the colon prep. Do not drink alcohol. Medicines    Your doctor will tell you if and when you can restart your medicines. You will also be given instructions about taking any new medicines. If you take aspirin or some other blood thinner, ask your doctor if and when to start taking it again. Make sure that you understand exactly what your doctor wants you to do. If polyps were removed or a biopsy was done during the test, your doctor may tell you not to take aspirin or other anti-inflammatory medicines for a few days. These include ibuprofen (Advil, Motrin) and naproxen (Aleve).

## 2023-08-29 NOTE — H&P
[email or web address]  [phone]    Chief Complaint: Diarrhea, elevated fecal calpro    PMH:   Past Medical History:   Diagnosis Date    Contact dermatitis and other eczema, due to unspecified cause     HS Bilat axilla    Diabetes mellitus ([address])     Generalized headaches 4/26/2010    Ill-defined condition     Hydrodenitis    Seasonal allergic rhinitis     Sleep apnea     cpap     Allergies:    Allergies   Allergen Reactions    Azithromycin Rash    Oxycodone-Acetaminophen Nausea Only    Hydrocodone-Acetaminophen Rash     Medications:   Current Facility-Administered Medications:     lidocaine PF 1 % injection 1 mL, 1 mL, IntraDERmal, Once PRN, Maisha David APRN - CRNA    famotidine (PEPCID) tablet 20 mg, 20 mg, Oral, Once, Maisha Dvaid, APRN - CRNA    lactated ringers IV soln infusion, , IntraVENous, Continuous, Maisha Frankie, APRN - CRNA  FH:   Family History   Problem Relation Age of Onset    Diabetes Maternal Grandmother     Cancer Maternal Aunt         breast    Stroke Father     Heart Disease Mother     Hypertension Mother     Diabetes Mother     Breast Cancer Maternal Grandmother     Breast Cancer Maternal Aunt      Social:   Social History     Socioeconomic History    Marital status:      Spouse name: None    Number of children: None    Years of education: None    Highest education level: None   Tobacco Use    Smoking status: Former    Smokeless tobacco: Never   Vaping Use    Vaping Use: Never used   Substance and Sexual Activity    Alcohol use: No    Drug use: No     Surgical H:   Past Surgical History:   Procedure Laterality Date    GYN      vaginal delivery x 2    LAP,CHOLECYSTECTOMY N/A 10/06/2016    Dr. Nickolas Jolly  2010    hydrodenitis removed from arm pit and drained    UPPER GASTROINTESTINAL ENDOSCOPY N/A 05/01/2023    EGD ESOPHAGOGASTRODUODENOSCOPY w/Biopsies performed by Funmilayo Comer MD at [address] Right        ROS: positive

## 2023-10-05 ENCOUNTER — TELEPHONE (OUTPATIENT)
Age: 38
End: 2023-10-05

## 2023-10-05 NOTE — TELEPHONE ENCOUNTER
----- Message from Caryn Donohue sent at 10/5/2023  9:03 AM EDT -----  Subject: Appointment Request    Reason for Call: Established Patient Appointment needed: Routine Existing   Condition Follow Up    QUESTIONS    Reason for appointment request? Requested Provider unavailable - Akil Helton     Additional Information for Provider? Pt is in need of a f/u after ED visit   about 2 wks ago at [facility] for congestive heart failure. pt   would like to see Akil Helton and I am unable to schedule for her.    please contact pt.  ---------------------------------------------------------------------------  --------------  Naveen Alexander [facility]  [phone]; OK to leave message on voicemail  ---------------------------------------------------------------------------  --------------  SCRIPT ANSWERS

## 2023-10-05 NOTE — TELEPHONE ENCOUNTER
Spoke with patient to schedule f/u for congestive heart failure.  Patient has been scheduled for     October 11, 2023 8:00 am

## 2023-10-11 ENCOUNTER — OFFICE VISIT (OUTPATIENT)
Age: 38
End: 2023-10-11
Payer: COMMERCIAL

## 2023-10-11 VITALS
HEART RATE: 91 BPM | RESPIRATION RATE: 20 BRPM | WEIGHT: 292.4 LBS | HEIGHT: 65 IN | TEMPERATURE: 98.1 F | BODY MASS INDEX: 48.72 KG/M2 | SYSTOLIC BLOOD PRESSURE: 108 MMHG | OXYGEN SATURATION: 98 % | DIASTOLIC BLOOD PRESSURE: 65 MMHG

## 2023-10-11 DIAGNOSIS — E66.01 OBESITY, CLASS III, BMI 40-49.9 (MORBID OBESITY) (HCC): ICD-10-CM

## 2023-10-11 DIAGNOSIS — R60.0 BILATERAL LEG EDEMA: ICD-10-CM

## 2023-10-11 DIAGNOSIS — R06.09 DYSPNEA ON EXERTION: Primary | ICD-10-CM

## 2023-10-11 PROCEDURE — 99214 OFFICE O/P EST MOD 30 MIN: CPT | Performed by: NURSE PRACTITIONER

## 2023-10-11 RX ORDER — MONTELUKAST SODIUM 10 MG/1
10 TABLET ORAL DAILY
Qty: 30 TABLET | Refills: 5 | Status: SHIPPED | OUTPATIENT
Start: 2023-10-11

## 2023-10-11 RX ORDER — ALBUTEROL SULFATE 90 UG/1
2 AEROSOL, METERED RESPIRATORY (INHALATION) EVERY 6 HOURS PRN
Qty: 18 G | Refills: 3 | Status: SHIPPED | OUTPATIENT
Start: 2023-10-11

## 2023-10-11 SDOH — ECONOMIC STABILITY: FOOD INSECURITY: WITHIN THE PAST 12 MONTHS, YOU WORRIED THAT YOUR FOOD WOULD RUN OUT BEFORE YOU GOT MONEY TO BUY MORE.: NEVER TRUE

## 2023-10-11 SDOH — ECONOMIC STABILITY: INCOME INSECURITY: HOW HARD IS IT FOR YOU TO PAY FOR THE VERY BASICS LIKE FOOD, HOUSING, MEDICAL CARE, AND HEATING?: NOT VERY HARD

## 2023-10-11 SDOH — ECONOMIC STABILITY: FOOD INSECURITY: WITHIN THE PAST 12 MONTHS, THE FOOD YOU BOUGHT JUST DIDN'T LAST AND YOU DIDN'T HAVE MONEY TO GET MORE.: NEVER TRUE

## 2023-10-11 SDOH — ECONOMIC STABILITY: HOUSING INSECURITY
IN THE LAST 12 MONTHS, WAS THERE A TIME WHEN YOU DID NOT HAVE A STEADY PLACE TO SLEEP OR SLEPT IN A SHELTER (INCLUDING NOW)?: NO

## 2023-10-11 ASSESSMENT — PATIENT HEALTH QUESTIONNAIRE - PHQ9
1. LITTLE INTEREST OR PLEASURE IN DOING THINGS: 0
2. FEELING DOWN, DEPRESSED OR HOPELESS: 0
SUM OF ALL RESPONSES TO PHQ9 QUESTIONS 1 & 2: 0
SUM OF ALL RESPONSES TO PHQ QUESTIONS 1-9: 0

## 2023-10-11 ASSESSMENT — ENCOUNTER SYMPTOMS: CHEST TIGHTNESS: 0

## 2023-10-11 NOTE — PROGRESS NOTES
Adarsh Thomas is a 45 y.o. female is a Established patient who was seen in clinic today alone (10/11/2023) for ED Follow-up ([address] [facility]) and Heart Problem    Assessment & Plan:   Carlos Roche was seen today for ed follow-up and heart problem. Diagnoses and all orders for this visit:    Dyspnea on exertion  -     [address], DO [place], Pulmonology, [facility], [facility]    Bilateral leg edema  -     Kerline Higginbotham MD, Vascular Surgery, [facility] ([address])    Obesity, Class III, BMI 40-49.9 (morbid obesity) ([address])  -     Lipid Panel; Future  -     Hemoglobin A1C; Future  -     Comprehensive Metabolic Panel; Future  -     CBC with Auto Differential; Future    Other orders  -     montelukast (SINGULAIR) 10 MG tablet; Take 1 tablet by mouth daily  -     albuterol sulfate HFA (PROVENTIL HFA) 108 (90 Base) MCG/ACT inhaler; Inhale 2 puffs into the lungs every 6 hours as needed for Wheezing  -     Spacer/Aero-Holding Chambers SEAN; 1 Device by Does not apply route daily as needed (to use with albuterol)      Anticipatory guidance for above provided and reviewed  alarm signs when to seek emergent care provided and reviewed     I have discussed the diagnosis with the patient and the intended plan as seen in the above orders. The patient has received an after-visit summary and questions were answered concerning future plans. I have discussed medication side effects and warnings with the patient as well. Patient agreeable with above plan and verbalizes understanding. Return in about 3 months (around 1/11/2024), or if symptoms worsen or fail to improve, for chronic care, in office. Subjective:   Patient was seen in the ED on 9/7/2023 for swelling and sob. Patient states she did have swelling a couple of weeks ago, resolved after 1-2 days with elevation and also took prescribed lasix.   Patient does have sob with exertion, comments she attributed some of her symptoms to

## 2023-10-11 NOTE — PROGRESS NOTES
1. \"Have you been to the ER, urgent care clinic since your last visit? Hospitalized since your last visit? \"  [facility], [facility], [facility]     2. \"Have you seen or consulted any other health care providers outside of the [address] [place] [address] since your last visit? \" No     3. For patients aged 43-73: Has the patient had a colonoscopy / FIT/ Cologuard? NA - based on age      If the patient is female:    4. For patients aged 43-66: Has the patient had a mammogram within the past 2 years? NA - based on age or sex      11. For patients aged 21-65: Has the patient had a pap smear? Yes - Care Gap present.  Most recent result on file

## 2023-10-13 ENCOUNTER — HOSPITAL ENCOUNTER (OUTPATIENT)
Facility: HOSPITAL | Age: 38
Discharge: HOME | End: 2023-10-13
Attending: INTERNAL MEDICINE
Payer: COMMERCIAL

## 2023-10-13 ENCOUNTER — HOSPITAL ENCOUNTER (OUTPATIENT)
Facility: HOSPITAL | Age: 38
End: 2023-10-13
Attending: INTERNAL MEDICINE
Payer: COMMERCIAL

## 2023-10-13 DIAGNOSIS — R11.0 NAUSEA: ICD-10-CM

## 2023-10-13 PROCEDURE — 3430000000 HC RX DIAGNOSTIC RADIOPHARMACEUTICAL: Performed by: INTERNAL MEDICINE

## 2023-10-13 PROCEDURE — A9541 TC99M SULFUR COLLOID: HCPCS | Performed by: INTERNAL MEDICINE

## 2023-10-13 RX ADMIN — Medication 1 MILLICURIE: at 10:00

## 2023-11-05 ASSESSMENT — ENCOUNTER SYMPTOMS: SHORTNESS OF BREATH: 1

## 2023-12-13 ENCOUNTER — HOSPITAL ENCOUNTER (OUTPATIENT)
Facility: HOSPITAL | Age: 38
Discharge: HOME | End: 2023-12-16
Payer: COMMERCIAL

## 2023-12-13 LAB
ALBUMIN SERPL-MCNC: 3.6 G/DL (ref 3.4–5)
ALBUMIN/GLOB SERPL: 1 (ref 0.8–1.7)
ALP SERPL-CCNC: 104 U/L (ref 45–117)
ALT SERPL-CCNC: 20 U/L (ref 13–56)
ANION GAP SERPL CALC-SCNC: 3 MMOL/L (ref 3–18)
AST SERPL-CCNC: 12 U/L (ref 10–38)
BASOPHILS # BLD: 0 K/UL (ref 0–0.1)
BASOPHILS NFR BLD: 0 % (ref 0–2)
BILIRUB SERPL-MCNC: 0.5 MG/DL (ref 0.2–1)
BUN SERPL-MCNC: 11 MG/DL (ref 7–18)
BUN/CREAT SERPL: 15 (ref 12–20)
CALCIUM SERPL-MCNC: 8.7 MG/DL (ref 8.5–10.1)
CHLORIDE SERPL-SCNC: 109 MMOL/L (ref 100–111)
CHOLEST SERPL-MCNC: 172 MG/DL
CO2 SERPL-SCNC: 26 MMOL/L (ref 21–32)
CREAT SERPL-MCNC: 0.72 MG/DL (ref 0.6–1.3)
DIFFERENTIAL METHOD BLD: ABNORMAL
EOSINOPHIL # BLD: 0.3 K/UL (ref 0–0.4)
EOSINOPHIL NFR BLD: 4 % (ref 0–5)
ERYTHROCYTE [DISTWIDTH] IN BLOOD BY AUTOMATED COUNT: 14.7 % (ref 11.6–14.5)
GLOBULIN SER CALC-MCNC: 3.7 G/DL (ref 2–4)
GLUCOSE SERPL-MCNC: 98 MG/DL (ref 74–99)
HBA1C MFR BLD: 5.9 % (ref 4.2–5.6)
HCT VFR BLD AUTO: 40 % (ref 35–45)
HDLC SERPL-MCNC: 50 MG/DL (ref 40–60)
HDLC SERPL: 3.4 (ref 0–5)
HGB BLD-MCNC: 12.6 G/DL (ref 12–16)
IMM GRANULOCYTES # BLD AUTO: 0 K/UL (ref 0–0.04)
IMM GRANULOCYTES NFR BLD AUTO: 0 % (ref 0–0.5)
LDLC SERPL CALC-MCNC: 114 MG/DL (ref 0–100)
LIPID PANEL: ABNORMAL
LYMPHOCYTES # BLD: 2.9 K/UL (ref 0.9–3.6)
LYMPHOCYTES NFR BLD: 41 % (ref 21–52)
MCH RBC QN AUTO: 26.3 PG (ref 24–34)
MCHC RBC AUTO-ENTMCNC: 31.5 G/DL (ref 31–37)
MCV RBC AUTO: 83.3 FL (ref 78–100)
MONOCYTES # BLD: 0.5 K/UL (ref 0.05–1.2)
MONOCYTES NFR BLD: 6 % (ref 3–10)
NEUTS SEG # BLD: 3.4 K/UL (ref 1.8–8)
NEUTS SEG NFR BLD: 48 % (ref 40–73)
NRBC # BLD: 0 K/UL (ref 0–0.01)
NRBC BLD-RTO: 0 PER 100 WBC
PLATELET # BLD AUTO: 279 K/UL (ref 135–420)
PMV BLD AUTO: 10.9 FL (ref 9.2–11.8)
POTASSIUM SERPL-SCNC: 4.3 MMOL/L (ref 3.5–5.5)
PROT SERPL-MCNC: 7.3 G/DL (ref 6.4–8.2)
RBC # BLD AUTO: 4.8 M/UL (ref 4.2–5.3)
SODIUM SERPL-SCNC: 138 MMOL/L (ref 136–145)
TRIGL SERPL-MCNC: 40 MG/DL
VLDLC SERPL CALC-MCNC: 8 MG/DL
WBC # BLD AUTO: 7 K/UL (ref 4.6–13.2)

## 2023-12-13 PROCEDURE — 36415 COLL VENOUS BLD VENIPUNCTURE: CPT

## 2023-12-13 PROCEDURE — 80053 COMPREHEN METABOLIC PANEL: CPT

## 2023-12-13 PROCEDURE — 83036 HEMOGLOBIN GLYCOSYLATED A1C: CPT

## 2023-12-13 PROCEDURE — 80061 LIPID PANEL: CPT

## 2023-12-13 PROCEDURE — 85025 COMPLETE CBC W/AUTO DIFF WBC: CPT

## 2025-05-20 ASSESSMENT — ENCOUNTER SYMPTOMS
RHINORRHEA: 0
ABDOMINAL DISTENTION: 0
ABDOMINAL PAIN: 0
CHEST TIGHTNESS: 0
SORE THROAT: 0
NAUSEA: 0
VOMITING: 0
SHORTNESS OF BREATH: 0
DIARRHEA: 0
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
## 2025-05-22 ENCOUNTER — OFFICE VISIT (OUTPATIENT)
Facility: CLINIC | Age: 40
End: 2025-05-22
Payer: COMMERCIAL

## 2025-05-22 VITALS
RESPIRATION RATE: 18 BRPM | DIASTOLIC BLOOD PRESSURE: 72 MMHG | OXYGEN SATURATION: 91 % | BODY MASS INDEX: 48.82 KG/M2 | HEIGHT: 65 IN | HEART RATE: 71 BPM | SYSTOLIC BLOOD PRESSURE: 104 MMHG | TEMPERATURE: 97.3 F | WEIGHT: 293 LBS

## 2025-05-22 DIAGNOSIS — G89.29 CHRONIC RIGHT-SIDED LOW BACK PAIN WITHOUT SCIATICA: ICD-10-CM

## 2025-05-22 DIAGNOSIS — E66.813 OBESITY, CLASS III, BMI 40-49.9 (MORBID OBESITY) (HCC): Primary | ICD-10-CM

## 2025-05-22 DIAGNOSIS — Z13.6 SCREENING FOR CARDIOVASCULAR CONDITION: ICD-10-CM

## 2025-05-22 DIAGNOSIS — Z11.4 SCREENING FOR HIV WITHOUT PRESENCE OF RISK FACTORS: ICD-10-CM

## 2025-05-22 DIAGNOSIS — R73.03 PREDIABETES: ICD-10-CM

## 2025-05-22 DIAGNOSIS — Z86.19 HISTORY OF HELICOBACTER PYLORI INFECTION: ICD-10-CM

## 2025-05-22 DIAGNOSIS — Z11.59 NEED FOR HEPATITIS C SCREENING TEST: ICD-10-CM

## 2025-05-22 DIAGNOSIS — G47.33 OSA (OBSTRUCTIVE SLEEP APNEA): ICD-10-CM

## 2025-05-22 DIAGNOSIS — M54.50 CHRONIC RIGHT-SIDED LOW BACK PAIN WITHOUT SCIATICA: ICD-10-CM

## 2025-05-22 DIAGNOSIS — Z76.89 ENCOUNTER TO ESTABLISH CARE WITH NEW DOCTOR: ICD-10-CM

## 2025-05-22 LAB — HBA1C MFR BLD: 6.1 %

## 2025-05-22 PROCEDURE — 83036 HEMOGLOBIN GLYCOSYLATED A1C: CPT | Performed by: STUDENT IN AN ORGANIZED HEALTH CARE EDUCATION/TRAINING PROGRAM

## 2025-05-22 PROCEDURE — G2211 COMPLEX E/M VISIT ADD ON: HCPCS | Performed by: STUDENT IN AN ORGANIZED HEALTH CARE EDUCATION/TRAINING PROGRAM

## 2025-05-22 PROCEDURE — 99214 OFFICE O/P EST MOD 30 MIN: CPT | Performed by: STUDENT IN AN ORGANIZED HEALTH CARE EDUCATION/TRAINING PROGRAM

## 2025-05-22 RX ORDER — IBUPROFEN 800 MG/1
800 TABLET, FILM COATED ORAL EVERY 8 HOURS PRN
Qty: 90 TABLET | Refills: 0 | Status: SHIPPED | OUTPATIENT
Start: 2025-05-22 | End: 2025-06-21

## 2025-05-22 RX ORDER — METHOCARBAMOL 750 MG/1
750 TABLET, FILM COATED ORAL 2 TIMES DAILY PRN
Qty: 30 TABLET | Refills: 0 | Status: SHIPPED | OUTPATIENT
Start: 2025-05-22 | End: 2025-06-06

## 2025-05-22 RX ORDER — MONTELUKAST SODIUM 10 MG/1
10 TABLET ORAL DAILY
Qty: 30 TABLET | Refills: 5 | Status: SHIPPED | OUTPATIENT
Start: 2025-05-22

## 2025-05-22 SDOH — ECONOMIC STABILITY: FOOD INSECURITY: WITHIN THE PAST 12 MONTHS, YOU WORRIED THAT YOUR FOOD WOULD RUN OUT BEFORE YOU GOT MONEY TO BUY MORE.: NEVER TRUE

## 2025-05-22 SDOH — ECONOMIC STABILITY: FOOD INSECURITY: WITHIN THE PAST 12 MONTHS, THE FOOD YOU BOUGHT JUST DIDN'T LAST AND YOU DIDN'T HAVE MONEY TO GET MORE.: NEVER TRUE

## 2025-05-22 ASSESSMENT — PATIENT HEALTH QUESTIONNAIRE - PHQ9
SUM OF ALL RESPONSES TO PHQ QUESTIONS 1-9: 0
1. LITTLE INTEREST OR PLEASURE IN DOING THINGS: NOT AT ALL
SUM OF ALL RESPONSES TO PHQ QUESTIONS 1-9: 0
2. FEELING DOWN, DEPRESSED OR HOPELESS: NOT AT ALL
SUM OF ALL RESPONSES TO PHQ QUESTIONS 1-9: 0
SUM OF ALL RESPONSES TO PHQ QUESTIONS 1-9: 0

## 2025-05-22 ASSESSMENT — ENCOUNTER SYMPTOMS: BACK PAIN: 1

## 2025-05-27 ENCOUNTER — HOSPITAL ENCOUNTER (OUTPATIENT)
Facility: HOSPITAL | Age: 40
Setting detail: SPECIMEN
Discharge: HOME | End: 2025-05-30
Payer: COMMERCIAL

## 2025-05-27 DIAGNOSIS — Z11.4 SCREENING FOR HIV WITHOUT PRESENCE OF RISK FACTORS: ICD-10-CM

## 2025-05-27 DIAGNOSIS — E66.813 OBESITY, CLASS III, BMI 40-49.9 (MORBID OBESITY) (HCC): ICD-10-CM

## 2025-05-27 DIAGNOSIS — Z11.59 NEED FOR HEPATITIS C SCREENING TEST: ICD-10-CM

## 2025-05-27 DIAGNOSIS — E66.813 OBESITY, CLASS III, BMI 40-49.9 (MORBID OBESITY) (HCC): Primary | ICD-10-CM

## 2025-05-27 DIAGNOSIS — Z76.89 ENCOUNTER TO ESTABLISH CARE WITH NEW DOCTOR: ICD-10-CM

## 2025-05-27 DIAGNOSIS — Z13.6 SCREENING FOR CARDIOVASCULAR CONDITION: ICD-10-CM

## 2025-05-27 LAB
ALBUMIN SERPL-MCNC: 3.4 G/DL (ref 3.4–5)
ALBUMIN/GLOB SERPL: 0.9 (ref 0.8–1.7)
ALP SERPL-CCNC: 101 U/L (ref 45–117)
ALT SERPL-CCNC: 17 U/L (ref 10–35)
ANION GAP SERPL CALC-SCNC: 12 MMOL/L (ref 3–18)
AST SERPL-CCNC: 14 U/L (ref 10–38)
BASOPHILS # BLD: 0.04 K/UL (ref 0–0.1)
BASOPHILS NFR BLD: 0.3 % (ref 0–2)
BILIRUB SERPL-MCNC: 0.2 MG/DL (ref 0.2–1)
BUN SERPL-MCNC: 18 MG/DL (ref 6–23)
BUN/CREAT SERPL: 16 (ref 12–20)
CALCIUM SERPL-MCNC: 9.3 MG/DL (ref 8.5–10.1)
CHLORIDE SERPL-SCNC: 103 MMOL/L (ref 98–107)
CHOLEST SERPL-MCNC: 213 MG/DL
CO2 SERPL-SCNC: 23 MMOL/L (ref 21–32)
CREAT SERPL-MCNC: 1.11 MG/DL (ref 0.6–1.3)
DIFFERENTIAL METHOD BLD: ABNORMAL
EOSINOPHIL # BLD: 0.08 K/UL (ref 0–0.4)
EOSINOPHIL NFR BLD: 0.7 % (ref 0–5)
ERYTHROCYTE [DISTWIDTH] IN BLOOD BY AUTOMATED COUNT: 15 % (ref 11.6–14.5)
GLOBULIN SER CALC-MCNC: 3.9 G/DL (ref 2–4)
GLUCOSE SERPL-MCNC: 96 MG/DL (ref 74–108)
HCT VFR BLD AUTO: 38.2 % (ref 35–45)
HDLC SERPL-MCNC: 59 MG/DL (ref 40–60)
HDLC SERPL: 3.6 (ref 0–5)
HGB BLD-MCNC: 11.6 G/DL (ref 12–16)
IMM GRANULOCYTES # BLD AUTO: 0.04 K/UL (ref 0–0.04)
IMM GRANULOCYTES NFR BLD AUTO: 0.3 % (ref 0–0.5)
LDLC SERPL CALC-MCNC: 126 MG/DL (ref 0–100)
LYMPHOCYTES # BLD: 3.89 K/UL (ref 0.9–3.6)
LYMPHOCYTES NFR BLD: 32.9 % (ref 21–52)
MCH RBC QN AUTO: 26.1 PG (ref 24–34)
MCHC RBC AUTO-ENTMCNC: 30.4 G/DL (ref 31–37)
MCV RBC AUTO: 85.8 FL (ref 78–100)
MONOCYTES # BLD: 0.44 K/UL (ref 0.05–1.2)
MONOCYTES NFR BLD: 3.7 % (ref 3–10)
NEUTS SEG # BLD: 7.34 K/UL (ref 1.8–8)
NEUTS SEG NFR BLD: 62.1 % (ref 40–73)
NRBC # BLD: 0 K/UL (ref 0–0.01)
NRBC BLD-RTO: 0 PER 100 WBC
PLATELET # BLD AUTO: 291 K/UL (ref 135–420)
PMV BLD AUTO: 11.1 FL (ref 9.2–11.8)
POTASSIUM SERPL-SCNC: 4.2 MMOL/L (ref 3.5–5.5)
PROT SERPL-MCNC: 7.3 G/DL (ref 6.4–8.2)
RBC # BLD AUTO: 4.45 M/UL (ref 4.2–5.3)
SODIUM SERPL-SCNC: 137 MMOL/L (ref 136–145)
TRIGL SERPL-MCNC: 140 MG/DL (ref 0–150)
TSH W FREE THYROID IF ABNORMAL: 0.75 UIU/ML (ref 0.27–4.2)
VLDLC SERPL CALC-MCNC: 28 MG/DL
WBC # BLD AUTO: 11.8 K/UL (ref 4.6–13.2)

## 2025-05-27 PROCEDURE — 36415 COLL VENOUS BLD VENIPUNCTURE: CPT

## 2025-05-27 PROCEDURE — 85025 COMPLETE CBC W/AUTO DIFF WBC: CPT

## 2025-05-27 PROCEDURE — 87389 HIV-1 AG W/HIV-1&-2 AB AG IA: CPT

## 2025-05-27 PROCEDURE — 86803 HEPATITIS C AB TEST: CPT

## 2025-05-27 PROCEDURE — 84443 ASSAY THYROID STIM HORMONE: CPT

## 2025-05-27 PROCEDURE — 80053 COMPREHEN METABOLIC PANEL: CPT

## 2025-05-27 PROCEDURE — 80061 LIPID PANEL: CPT

## 2025-05-28 LAB
HCV AB SER QL: NONREACTIVE
HIV 1+2 AB+HIV1 P24 AG SERPL QL IA: NONREACTIVE
HIV 1/2 RESULT COMMENT: NORMAL

## 2025-05-29 ENCOUNTER — RESULTS FOLLOW-UP (OUTPATIENT)
Facility: CLINIC | Age: 40
End: 2025-05-29

## 2025-06-02 ENCOUNTER — HOSPITAL ENCOUNTER (OUTPATIENT)
Facility: HOSPITAL | Age: 40
Setting detail: SPECIMEN
Discharge: HOME | End: 2025-06-05
Payer: COMMERCIAL

## 2025-06-02 DIAGNOSIS — Z86.19 HISTORY OF HELICOBACTER PYLORI INFECTION: ICD-10-CM

## 2025-06-02 PROCEDURE — 87338 HPYLORI STOOL AG IA: CPT

## 2025-06-04 LAB
H PYLORI AG STL QL IA: NEGATIVE
SPECIMEN SOURCE: NORMAL

## 2025-06-26 ASSESSMENT — ENCOUNTER SYMPTOMS
ABDOMINAL PAIN: 0
SORE THROAT: 0
CHEST TIGHTNESS: 0
RHINORRHEA: 0
DIARRHEA: 0
ABDOMINAL DISTENTION: 0
VOMITING: 0
NAUSEA: 0

## 2025-06-26 NOTE — PROGRESS NOTES
Shruthi Nguyen is a 39 y.o. female presenting today for Other (Acute visit)  .     Chief Complaint   Patient presents with    Other     Acute visit       HPI:  Shruthi Nguyen presents to the office today for acute visit    Patient has a PMH of  abnormal uterine bleeding 2/2 fibroids, GERD, CARLO on CPAP, Obesity, vitamin deficiency, H. Pylori infection (s/p treatment)     Patient was admitted on 6/17/25 to 6/21/25 for sepsis 2/2 left upper lobe multifocal pneumonia. She was treated with ceftriaxone and azithromycin. She was sent home on doxycyline and levofloxacin. She still has a slight cough with clear sputum, mild SOB with exertion, and fatigue but overall she feels much improved.  Denies chest pain, swelling in her legs.     She recently got established with a weight loss specialist and is on Mounjaro    Pap smear: was last year normal, patient will be due in 2030  Vaccinations:    Review of Systems   Constitutional:  Positive for fatigue. Negative for chills, diaphoresis and fever.   HENT:  Negative for ear pain, rhinorrhea and sore throat.    Eyes:  Negative for visual disturbance.   Respiratory:  Positive for cough and shortness of breath. Negative for chest tightness.    Cardiovascular:  Negative for chest pain and leg swelling.   Gastrointestinal:  Negative for abdominal distention, abdominal pain, diarrhea, nausea and vomiting.   Genitourinary:  Negative for difficulty urinating and dysuria.   Musculoskeletal:  Negative for arthralgias.   Skin:  Negative for rash.   Allergic/Immunologic: Negative for immunocompromised state.   Neurological:  Negative for speech difficulty, weakness, numbness and headaches.   Psychiatric/Behavioral:  Negative for dysphoric mood.          Allergies   Allergen Reactions    Azithromycin Rash    Oxycodone-Acetaminophen Nausea Only    Hydrocodone-Acetaminophen Rash       PHQ Screening       6/27/2025    10:45 AM 5/22/2025     9:35 AM 10/11/2023     8:14 AM 3/1/2021

## 2025-06-27 ENCOUNTER — OFFICE VISIT (OUTPATIENT)
Facility: CLINIC | Age: 40
End: 2025-06-27

## 2025-06-27 VITALS
HEIGHT: 65 IN | DIASTOLIC BLOOD PRESSURE: 82 MMHG | HEART RATE: 91 BPM | TEMPERATURE: 98.1 F | SYSTOLIC BLOOD PRESSURE: 120 MMHG | WEIGHT: 293 LBS | RESPIRATION RATE: 18 BRPM | BODY MASS INDEX: 48.82 KG/M2 | OXYGEN SATURATION: 97 %

## 2025-06-27 DIAGNOSIS — R73.03 PREDIABETES: ICD-10-CM

## 2025-06-27 DIAGNOSIS — Z09 HOSPITAL DISCHARGE FOLLOW-UP: Primary | ICD-10-CM

## 2025-06-27 DIAGNOSIS — E66.813 OBESITY, CLASS III, BMI 40-49.9 (MORBID OBESITY) (HCC): ICD-10-CM

## 2025-06-27 RX ORDER — TIRZEPATIDE 2.5 MG/.5ML
2.5 INJECTION, SOLUTION SUBCUTANEOUS WEEKLY
COMMUNITY
Start: 2025-06-25

## 2025-06-27 SDOH — ECONOMIC STABILITY: FOOD INSECURITY: WITHIN THE PAST 12 MONTHS, YOU WORRIED THAT YOUR FOOD WOULD RUN OUT BEFORE YOU GOT MONEY TO BUY MORE.: NEVER TRUE

## 2025-06-27 SDOH — ECONOMIC STABILITY: FOOD INSECURITY: WITHIN THE PAST 12 MONTHS, THE FOOD YOU BOUGHT JUST DIDN'T LAST AND YOU DIDN'T HAVE MONEY TO GET MORE.: NEVER TRUE

## 2025-06-27 ASSESSMENT — PATIENT HEALTH QUESTIONNAIRE - PHQ9
2. FEELING DOWN, DEPRESSED OR HOPELESS: NOT AT ALL
SUM OF ALL RESPONSES TO PHQ QUESTIONS 1-9: 0
SUM OF ALL RESPONSES TO PHQ QUESTIONS 1-9: 0
1. LITTLE INTEREST OR PLEASURE IN DOING THINGS: NOT AT ALL
SUM OF ALL RESPONSES TO PHQ QUESTIONS 1-9: 0
SUM OF ALL RESPONSES TO PHQ QUESTIONS 1-9: 0

## 2025-06-27 ASSESSMENT — ENCOUNTER SYMPTOMS
COUGH: 1
SHORTNESS OF BREATH: 1

## 2025-06-27 NOTE — PROGRESS NOTES
Have you been to the ER, urgent care clinic since your last visit?  Hospitalized since your last visit?   YES - When: approximately 1  weeks ago.  Where and Why: Pt went to the ED for Pneumonia.    Have you seen or consulted any other health care providers outside our system since your last visit?   NO     “Have you had a pap smear?”    NO    Date of last Cervical Cancer screen (HPV or PAP): 6/19/2020

## (undated) DEVICE — MEDI-VAC NON-CONDUCTIVE SUCTION TUBING: Brand: CARDINAL HEALTH

## (undated) DEVICE — LINER SUCT CANSTR 3000CC PLAS SFT PRE ASSEMB W/OUT TBNG W/

## (undated) DEVICE — ENDOSCOPY PUMP TUBING/ CAP SET: Brand: ERBE

## (undated) DEVICE — GAUZE,SPONGE,4"X4",16PLY,STRL,LF,10/TRAY: Brand: MEDLINE

## (undated) DEVICE — BITE BLOCK ENDOSCP UNIV AD 6 TO 9.4 MM

## (undated) DEVICE — SOLUTION IRRIG 1000ML H2O STRL BLT

## (undated) DEVICE — SYRINGE MED 25GA 3ML L5/8IN SUBQ PLAS W/ DETACH NDL SFTY

## (undated) DEVICE — SYRINGE MED 10ML LUERLOCK TIP W/O SFTY DISP

## (undated) DEVICE — SYRINGE MED 50ML LUERSLIP TIP

## (undated) DEVICE — CATHETER SUCT TR FL TIP 14FR W/ O CTRL

## (undated) DEVICE — TRAP SPEC POLYP REM STRNR CLN DSGN MAGNIFYING WIND DISP

## (undated) DEVICE — GOWN PLASTIC FILM THMBHKS UNIV BLUE: Brand: CARDINAL HEALTH

## (undated) DEVICE — FORCEPS BX L240CM JAW DIA2.4MM ORNG L CAP W/ NDL DISP RAD

## (undated) DEVICE — UNDERPAD INCONT W23XL36IN STD BLU POLYPR BK FLUF SFT

## (undated) DEVICE — YANKAUER,SMOOTH HANDLE,HIGH CAPACITY: Brand: MEDLINE INDUSTRIES, INC.

## (undated) DEVICE — CANNULA ORIG TL CLR W FOAM CUSHIONS AND 14FT SUPL TB 3 CHN

## (undated) DEVICE — SNARE VASC L240CM LOOP W10MM SHTH DIA2.4MM RND STIFF CLD

## (undated) DEVICE — CANNULA NSL AD TBNG L14FT STD PVC O2 CRV CONN NONFLARED NSL

## (undated) DEVICE — STERILE POLYISOPRENE POWDER-FREE SURGICAL GLOVES: Brand: PROTEXIS

## (undated) DEVICE — BASIN EMSIS 16OZ GRAPHITE PLAS KID SHP MOLD GRAD FOR ORAL

## (undated) DEVICE — SYRINGE 20ML LL S/C 50